# Patient Record
Sex: FEMALE | Race: WHITE | NOT HISPANIC OR LATINO | URBAN - METROPOLITAN AREA
[De-identification: names, ages, dates, MRNs, and addresses within clinical notes are randomized per-mention and may not be internally consistent; named-entity substitution may affect disease eponyms.]

---

## 2023-08-31 ENCOUNTER — HOSPITAL ENCOUNTER (OUTPATIENT)
Dept: DATA CONVERSION | Facility: HOSPITAL | Age: 53
Discharge: HOME | End: 2023-08-31
Payer: COMMERCIAL

## 2023-08-31 DIAGNOSIS — M54.13 RADICULOPATHY, CERVICOTHORACIC REGION: ICD-10-CM

## 2023-09-01 PROBLEM — R42 LIGHTHEADEDNESS: Status: ACTIVE | Noted: 2023-09-01

## 2023-09-01 PROBLEM — K21.9 GERD (GASTROESOPHAGEAL REFLUX DISEASE): Status: ACTIVE | Noted: 2023-09-01

## 2023-09-01 PROBLEM — G47.30 SLEEP APNEA: Status: ACTIVE | Noted: 2023-09-01

## 2023-09-01 PROBLEM — G62.9 NEUROPATHY: Status: ACTIVE | Noted: 2023-09-01

## 2023-09-01 PROBLEM — M51.369 DEGENERATION OF LUMBAR INTERVERTEBRAL DISC: Status: ACTIVE | Noted: 2023-09-01

## 2023-09-01 PROBLEM — F32.A DEPRESSION: Status: ACTIVE | Noted: 2023-09-01

## 2023-09-01 PROBLEM — R07.89 NON-CARDIAC CHEST PAIN: Status: ACTIVE | Noted: 2023-09-01

## 2023-09-01 PROBLEM — E78.5 DYSLIPIDEMIA: Status: ACTIVE | Noted: 2023-09-01

## 2023-09-01 PROBLEM — D72.829 LEUKOCYTOSIS: Status: ACTIVE | Noted: 2023-09-01

## 2023-09-01 PROBLEM — I10 HYPERTENSION: Status: ACTIVE | Noted: 2023-09-01

## 2023-09-01 PROBLEM — E78.5 HYPERLIPIDEMIA: Status: ACTIVE | Noted: 2023-09-01

## 2023-09-01 PROBLEM — I25.10 CORONARY ARTERY DISEASE: Status: ACTIVE | Noted: 2023-09-01

## 2023-09-01 PROBLEM — L03.90 CELLULITIS: Status: ACTIVE | Noted: 2023-09-01

## 2023-09-01 PROBLEM — N95.1 VAGINAL DRYNESS, MENOPAUSAL: Status: ACTIVE | Noted: 2023-09-01

## 2023-09-01 PROBLEM — I10 BENIGN ESSENTIAL HYPERTENSION: Status: ACTIVE | Noted: 2023-09-01

## 2023-09-01 PROBLEM — E11.9 TYPE 2 DIABETES MELLITUS (MULTI): Status: ACTIVE | Noted: 2023-09-01

## 2023-09-01 PROBLEM — N92.1 BREAKTHROUGH BLEEDING: Status: ACTIVE | Noted: 2023-09-01

## 2023-09-01 PROBLEM — E66.01 SEVERE OBESITY (BMI >= 40) (MULTI): Status: ACTIVE | Noted: 2023-09-01

## 2023-09-01 PROBLEM — M51.36 DEGENERATION OF LUMBAR INTERVERTEBRAL DISC: Status: ACTIVE | Noted: 2023-09-01

## 2023-09-01 PROBLEM — F41.9 ANXIETY: Status: ACTIVE | Noted: 2023-09-01

## 2023-09-01 PROBLEM — N92.6 IRREGULAR MENSES: Status: ACTIVE | Noted: 2023-09-01

## 2023-09-01 PROBLEM — E55.9 VITAMIN D DEFICIENCY: Status: ACTIVE | Noted: 2023-09-01

## 2023-09-01 PROBLEM — R73.09 BLOOD GLUCOSE ABNORMAL: Status: ACTIVE | Noted: 2023-09-01

## 2023-09-01 PROBLEM — E66.9 OBESITY WITH BODY MASS INDEX 30 OR GREATER: Status: ACTIVE | Noted: 2023-09-01

## 2023-09-01 PROBLEM — R10.13 EPIGASTRIC PAIN: Status: ACTIVE | Noted: 2023-09-01

## 2023-09-01 PROBLEM — Z91.148 NONCOMPLIANCE WITH MEDICATION REGIMEN: Status: ACTIVE | Noted: 2023-09-01

## 2023-09-01 PROBLEM — G43.909 MIGRAINE: Status: ACTIVE | Noted: 2023-09-01

## 2023-09-01 PROBLEM — Z91.89 AT RISK FOR BLEEDING: Status: ACTIVE | Noted: 2023-09-01

## 2023-09-01 PROBLEM — F17.200 TOBACCO DEPENDENCE: Status: ACTIVE | Noted: 2023-09-01

## 2023-09-01 RX ORDER — VALSARTAN 40 MG/1
40 TABLET ORAL DAILY
COMMUNITY
End: 2023-10-24 | Stop reason: SDUPTHER

## 2023-09-01 RX ORDER — EZETIMIBE 10 MG/1
10 TABLET ORAL DAILY
COMMUNITY
End: 2023-12-07 | Stop reason: SDUPTHER

## 2023-09-01 RX ORDER — GABAPENTIN 300 MG/1
600 CAPSULE ORAL DAILY
COMMUNITY
Start: 2023-08-11 | End: 2023-11-06 | Stop reason: ALTCHOICE

## 2023-09-01 RX ORDER — ERGOCALCIFEROL 1.25 MG/1
1 CAPSULE ORAL
COMMUNITY

## 2023-09-01 RX ORDER — TOPIRAMATE 100 MG/1
200 TABLET, FILM COATED ORAL NIGHTLY
COMMUNITY

## 2023-09-01 RX ORDER — GABAPENTIN 600 MG/1
2 TABLET, FILM COATED ORAL NIGHTLY
COMMUNITY

## 2023-09-01 RX ORDER — AMITRIPTYLINE HYDROCHLORIDE 25 MG/1
25 TABLET, FILM COATED ORAL NIGHTLY
COMMUNITY
End: 2024-01-24 | Stop reason: SDUPTHER

## 2023-09-01 RX ORDER — CARVEDILOL 12.5 MG/1
12.5 TABLET ORAL 2 TIMES DAILY
COMMUNITY
End: 2024-01-24 | Stop reason: SDUPTHER

## 2023-09-01 RX ORDER — NORETHINDRONE 5 MG/1
5 TABLET ORAL DAILY
COMMUNITY
Start: 2020-10-26 | End: 2024-01-25 | Stop reason: SDUPTHER

## 2023-09-01 RX ORDER — BLOOD SUGAR DIAGNOSTIC
STRIP MISCELLANEOUS
COMMUNITY
Start: 2023-08-03 | End: 2024-01-24 | Stop reason: SDUPTHER

## 2023-09-01 RX ORDER — ESTRADIOL 0.1 MG/G
CREAM VAGINAL
COMMUNITY
Start: 2022-11-17 | End: 2023-11-06 | Stop reason: ALTCHOICE

## 2023-09-01 RX ORDER — DULAGLUTIDE 0.75 MG/.5ML
INJECTION, SOLUTION SUBCUTANEOUS
COMMUNITY
Start: 2022-09-21 | End: 2023-11-06 | Stop reason: ALTCHOICE

## 2023-09-01 RX ORDER — VERAPAMIL HYDROCHLORIDE 120 MG/1
120 TABLET, FILM COATED, EXTENDED RELEASE ORAL NIGHTLY
COMMUNITY
Start: 2023-06-28

## 2023-09-01 RX ORDER — PRAVASTATIN SODIUM 80 MG/1
80 TABLET ORAL DAILY
COMMUNITY
End: 2024-01-24 | Stop reason: SDUPTHER

## 2023-09-01 RX ORDER — LANCETS 28 GAUGE
EACH MISCELLANEOUS
COMMUNITY
Start: 2023-07-10

## 2023-09-01 RX ORDER — LANCETS 33 GAUGE
EACH MISCELLANEOUS
COMMUNITY
Start: 2023-07-14 | End: 2024-01-24 | Stop reason: SDUPTHER

## 2023-09-01 RX ORDER — DULAGLUTIDE 1.5 MG/.5ML
INJECTION, SOLUTION SUBCUTANEOUS
COMMUNITY
End: 2023-12-04 | Stop reason: SDUPTHER

## 2023-09-01 RX ORDER — POTASSIUM CHLORIDE 750 MG/1
10 TABLET, EXTENDED RELEASE ORAL 2 TIMES DAILY
COMMUNITY
End: 2024-01-22

## 2023-09-01 RX ORDER — HYDROCHLOROTHIAZIDE 12.5 MG/1
12.5 CAPSULE ORAL EVERY MORNING
COMMUNITY
End: 2024-01-24 | Stop reason: SDUPTHER

## 2023-09-01 RX ORDER — METFORMIN HYDROCHLORIDE 500 MG/1
500 TABLET ORAL
COMMUNITY
Start: 2022-10-08 | End: 2023-11-06 | Stop reason: ALTCHOICE

## 2023-09-01 RX ORDER — ALBUTEROL SULFATE 90 UG/1
2 AEROSOL, METERED RESPIRATORY (INHALATION) EVERY 4 HOURS PRN
COMMUNITY
Start: 2017-01-03

## 2023-09-01 RX ORDER — BUTALBITAL, ACETAMINOPHEN AND CAFFEINE 50; 325; 40 MG/1; MG/1; MG/1
TABLET ORAL
COMMUNITY

## 2023-09-01 RX ORDER — ESCITALOPRAM OXALATE 20 MG/1
20 TABLET ORAL NIGHTLY
COMMUNITY
Start: 2023-08-01

## 2023-10-24 DIAGNOSIS — E11.9 TYPE 2 DIABETES MELLITUS WITHOUT COMPLICATION, WITHOUT LONG-TERM CURRENT USE OF INSULIN (MULTI): Primary | ICD-10-CM

## 2023-10-24 RX ORDER — VALSARTAN 40 MG/1
40 TABLET ORAL DAILY
Qty: 90 TABLET | Refills: 0 | Status: SHIPPED | OUTPATIENT
Start: 2023-10-24 | End: 2024-01-24 | Stop reason: SDUPTHER

## 2023-10-26 ENCOUNTER — TELEPHONE (OUTPATIENT)
Dept: PRIMARY CARE | Facility: CLINIC | Age: 53
End: 2023-10-26
Payer: COMMERCIAL

## 2023-10-26 DIAGNOSIS — E11.9 TYPE 2 DIABETES MELLITUS WITHOUT COMPLICATION, WITHOUT LONG-TERM CURRENT USE OF INSULIN (MULTI): ICD-10-CM

## 2023-10-26 RX ORDER — FLASH GLUCOSE SENSOR
KIT MISCELLANEOUS
Qty: 2 EACH | Refills: 2 | Status: SHIPPED | OUTPATIENT
Start: 2023-10-26 | End: 2023-10-27

## 2023-11-02 ENCOUNTER — LAB (OUTPATIENT)
Dept: LAB | Facility: LAB | Age: 53
End: 2023-11-02
Payer: COMMERCIAL

## 2023-11-02 DIAGNOSIS — I10 ESSENTIAL (PRIMARY) HYPERTENSION: Primary | ICD-10-CM

## 2023-11-02 DIAGNOSIS — E11.9 TYPE 2 DIABETES MELLITUS WITHOUT COMPLICATIONS (MULTI): ICD-10-CM

## 2023-11-02 LAB
ANION GAP SERPL CALC-SCNC: 14 MMOL/L
BUN SERPL-MCNC: 6 MG/DL (ref 8–25)
CALCIUM SERPL-MCNC: 9.1 MG/DL (ref 8.5–10.4)
CHLORIDE SERPL-SCNC: 102 MMOL/L (ref 97–107)
CO2 SERPL-SCNC: 23 MMOL/L (ref 24–31)
CREAT SERPL-MCNC: 0.8 MG/DL (ref 0.4–1.6)
ERYTHROCYTE [DISTWIDTH] IN BLOOD BY AUTOMATED COUNT: 13.5 % (ref 11.5–14.5)
EST. AVERAGE GLUCOSE BLD GHB EST-MCNC: 171 MG/DL
GFR SERPL CREATININE-BSD FRML MDRD: 88 ML/MIN/1.73M*2
GLUCOSE SERPL-MCNC: 202 MG/DL (ref 65–99)
HBA1C MFR BLD: 7.6 %
HCT VFR BLD AUTO: 50.1 % (ref 36–46)
HGB BLD-MCNC: 16.4 G/DL (ref 12–16)
MCH RBC QN AUTO: 31.1 PG (ref 26–34)
MCHC RBC AUTO-ENTMCNC: 32.7 G/DL (ref 32–36)
MCV RBC AUTO: 95 FL (ref 80–100)
NRBC BLD-RTO: 0 /100 WBCS (ref 0–0)
PLATELET # BLD AUTO: 237 X10*3/UL (ref 150–450)
POTASSIUM SERPL-SCNC: 4.1 MMOL/L (ref 3.4–5.1)
RBC # BLD AUTO: 5.27 X10*6/UL (ref 4–5.2)
SODIUM SERPL-SCNC: 139 MMOL/L (ref 133–145)
WBC # BLD AUTO: 9.6 X10*3/UL (ref 4.4–11.3)

## 2023-11-02 PROCEDURE — 80048 BASIC METABOLIC PNL TOTAL CA: CPT

## 2023-11-02 PROCEDURE — 36415 COLL VENOUS BLD VENIPUNCTURE: CPT

## 2023-11-02 PROCEDURE — 85027 COMPLETE CBC AUTOMATED: CPT

## 2023-11-02 PROCEDURE — 83036 HEMOGLOBIN GLYCOSYLATED A1C: CPT

## 2023-11-06 ENCOUNTER — OFFICE VISIT (OUTPATIENT)
Dept: PRIMARY CARE | Facility: CLINIC | Age: 53
End: 2023-11-06
Payer: COMMERCIAL

## 2023-11-06 VITALS
SYSTOLIC BLOOD PRESSURE: 116 MMHG | OXYGEN SATURATION: 98 % | DIASTOLIC BLOOD PRESSURE: 74 MMHG | TEMPERATURE: 97 F | WEIGHT: 278 LBS | BODY MASS INDEX: 42.27 KG/M2 | HEART RATE: 80 BPM

## 2023-11-06 DIAGNOSIS — I10 BENIGN ESSENTIAL HYPERTENSION: Primary | ICD-10-CM

## 2023-11-06 DIAGNOSIS — E11.9 TYPE 2 DIABETES MELLITUS WITHOUT COMPLICATION, WITHOUT LONG-TERM CURRENT USE OF INSULIN (MULTI): ICD-10-CM

## 2023-11-06 PROCEDURE — 90686 IIV4 VACC NO PRSV 0.5 ML IM: CPT | Performed by: INTERNAL MEDICINE

## 2023-11-06 PROCEDURE — 3074F SYST BP LT 130 MM HG: CPT | Performed by: INTERNAL MEDICINE

## 2023-11-06 PROCEDURE — 3078F DIAST BP <80 MM HG: CPT | Performed by: INTERNAL MEDICINE

## 2023-11-06 PROCEDURE — 3051F HG A1C>EQUAL 7.0%<8.0%: CPT | Performed by: INTERNAL MEDICINE

## 2023-11-06 PROCEDURE — 4010F ACE/ARB THERAPY RXD/TAKEN: CPT | Performed by: INTERNAL MEDICINE

## 2023-11-06 PROCEDURE — 99213 OFFICE O/P EST LOW 20 MIN: CPT | Performed by: INTERNAL MEDICINE

## 2023-11-06 PROCEDURE — 4004F PT TOBACCO SCREEN RCVD TLK: CPT | Performed by: INTERNAL MEDICINE

## 2023-11-06 PROCEDURE — 99213 OFFICE O/P EST LOW 20 MIN: CPT | Mod: 25 | Performed by: INTERNAL MEDICINE

## 2023-11-06 ASSESSMENT — ENCOUNTER SYMPTOMS
MYALGIAS: 0
DYSURIA: 0
TREMORS: 0
DEPRESSION: 0
SEIZURES: 0
TROUBLE SWALLOWING: 0
CHILLS: 0
SHORTNESS OF BREATH: 0
FREQUENCY: 0
VOMITING: 0
COUGH: 0
BACK PAIN: 1
OCCASIONAL FEELINGS OF UNSTEADINESS: 0
FATIGUE: 0
POLYDIPSIA: 0
NUMBNESS: 0
LOSS OF SENSATION IN FEET: 0
SINUS PRESSURE: 0
ARTHRALGIAS: 1
BLOOD IN STOOL: 0
WHEEZING: 0
POLYPHAGIA: 0
ABDOMINAL PAIN: 0
PALPITATIONS: 0
NAUSEA: 0

## 2023-11-06 ASSESSMENT — PATIENT HEALTH QUESTIONNAIRE - PHQ9
SUM OF ALL RESPONSES TO PHQ9 QUESTIONS 1 AND 2: 0
1. LITTLE INTEREST OR PLEASURE IN DOING THINGS: NOT AT ALL
2. FEELING DOWN, DEPRESSED OR HOPELESS: NOT AT ALL

## 2023-11-06 ASSESSMENT — PAIN SCALES - GENERAL: PAINLEVEL: 6

## 2023-11-06 NOTE — PROGRESS NOTES
Subjective   Patient ID: Camille Philip is a 53 y.o. female who presents for Follow-up.    HPI     Has history of hypertension, hyperlipidemia, type 2 diabetes mellitus, depression, coronary artery disease, migraine headaches.         History of coronary artery disease status post CABG in 2013-follows with cardiologist Dr Bejarano. Was advised to follow up as needed.         History of diabetes mellitus-compliant with medications. Denied any hypoglycemic episodes. Has free style Melissa.         History of hyperlipidemia-compliant with medications.         H/O Migraine headaches- Taking Amitriptyline and migraine headaches.         Takes Gabapentin for neuropathy and migraine headaches.         H/O Tobacco dependence- smokes pack every 2-3 days. Started smoking when she was 15 years old.         Seeing Dr Has for left knee pain- taking Medrol dose sherly. She was told that-she has mild meniscal tear, she will go for repair if no improvement in next few months.   Seeing Dr Mckay for low back pain, had spinal block 2 weeks ago   Complaining of nasal conegstion, with post nasal drainage- advised to try flonase OTC    Review of Systems   Constitutional:  Negative for chills and fatigue.   HENT:  Negative for postnasal drip, sinus pressure and trouble swallowing.    Respiratory:  Negative for cough, shortness of breath and wheezing.    Cardiovascular:  Negative for chest pain, palpitations and leg swelling.   Gastrointestinal:  Negative for abdominal pain, blood in stool, nausea and vomiting.   Endocrine: Negative for polydipsia, polyphagia and polyuria.   Genitourinary:  Negative for dysuria and frequency.   Musculoskeletal:  Positive for arthralgias and back pain. Negative for myalgias.   Skin:  Negative for rash.   Neurological:  Negative for tremors, seizures and numbness.   Psychiatric/Behavioral:  Negative for behavioral problems.        Objective   /74 (BP Location: Left arm, Patient Position: Sitting, BP  Cuff Size: Adult)   Pulse 80   Temp 36.1 °C (97 °F) (Temporal)   Wt 126 kg (278 lb)   SpO2 98%   BMI 42.27 kg/m²     Physical Exam  Constitutional:       General: She is not in acute distress.     Appearance: Normal appearance.   HENT:      Head: Normocephalic and atraumatic.      Right Ear: Tympanic membrane normal.      Left Ear: Tympanic membrane normal.   Eyes:      Extraocular Movements: Extraocular movements intact.      Pupils: Pupils are equal, round, and reactive to light.   Cardiovascular:      Rate and Rhythm: Normal rate and regular rhythm.      Heart sounds: Normal heart sounds. No murmur heard.     No friction rub.   Pulmonary:      Effort: Pulmonary effort is normal.      Breath sounds: Normal breath sounds. No wheezing or rales.   Abdominal:      General: Bowel sounds are normal.      Palpations: Abdomen is soft.      Tenderness: There is no abdominal tenderness. There is no guarding.   Musculoskeletal:         General: Normal range of motion.      Cervical back: Normal range of motion and neck supple.      Right lower leg: No edema.      Left lower leg: No edema.   Skin:     Findings: No rash.   Neurological:      Mental Status: She is alert.      Cranial Nerves: No cranial nerve deficit.   Psychiatric:         Mood and Affect: Mood normal.         Assessment/Plan       Camille was seen today for follow-up.  Diagnoses and all orders for this visit:  Benign essential hypertension (Primary)  Type 2 diabetes mellitus without complication, without long-term current use of insulin (CMS/[facility])  Other orders  -     Flu vaccine (IIV4) age 6 months and greater, preservative free     Reviewed recent labs with patient.  Last hemoglobin A1c 7.6, advised compliance with low-carb diet.  Stated due to back pain she has not been compliant with the diet.  She has also not been very active.   Planing of nasal congestion, denied any sinus pressure, fever, chills.  Advised to try Flonase over-the-counter    Current  Outpatient Medications   Medication Instructions    albuterol (Proventil HFA) 90 mcg/actuation inhaler 2 puffs, inhalation, Every 4 hours PRN    amitriptyline (ELAVIL) 25 mg, oral, Nightly    butalbital-acetaminophen-caff -40 mg tablet  TAKE 1 TO 2 TABLET BY MOUTH EVERY 4 HOURS AS NEEDED F    carvedilol (COREG) 12.5 mg, oral, 2 times daily, With food    cyanocobalamin, vitamin B-12, (VITAMIN B-12 ORAL) 1000 MCG  as directed Orally    ergocalciferol (Vitamin D-2) 1.25 MG (93062 UT) capsule 1 capsule, oral, Weekly    escitalopram (LEXAPRO) 20 mg, oral, Daily    ezetimibe (ZETIA) 10 mg, oral, Daily    FreeStyle Lancets 28 gauge USE AS DIRECTED EVERY 12 HOURS    Gralise 600 mg tablet extended release 24 hr 2 tablets, oral, Daily    hydroCHLOROthiazide (MICROZIDE) 12.5 mg, oral, Every morning    norethindrone (AYGESTIN) 5 mg, oral, Daily    OneTouch Delica Plus Lancet 33 gauge misc USE AS DIRECTED DAILY    OneTouch Ultra Test strip USE ONCE DAILY AS DIRECTED    potassium chloride CR 10 mEq ER tablet 10 mEq, oral, 2 times daily, WITH FOOD    pravastatin (PRAVACHOL) 80 mg, oral, Daily    topiramate (TOPAMAX) 200 mg, oral, Nightly    Trulicity 1.5 mg/0.5 mL pen injector injection Inject entire syringe subcutaneously weekly    valsartan (DIOVAN) 40 mg, oral, Daily    verapamil SR (CALAN-SR) 120 mg, oral, Daily

## 2023-11-16 ENCOUNTER — EVALUATION (OUTPATIENT)
Dept: PHYSICAL THERAPY | Facility: CLINIC | Age: 53
End: 2023-11-16
Payer: COMMERCIAL

## 2023-11-16 DIAGNOSIS — M47.26 OTHER SPONDYLOSIS WITH RADICULOPATHY, LUMBAR REGION: ICD-10-CM

## 2023-11-16 DIAGNOSIS — M54.16 LUMBAR RADICULOPATHY: Primary | ICD-10-CM

## 2023-11-16 PROCEDURE — 97162 PT EVAL MOD COMPLEX 30 MIN: CPT | Mod: GP | Performed by: PHYSICAL THERAPIST

## 2023-11-16 ASSESSMENT — ENCOUNTER SYMPTOMS
LOSS OF SENSATION IN FEET: 1
DEPRESSION: 0
OCCASIONAL FEELINGS OF UNSTEADINESS: 1

## 2023-11-16 NOTE — PROGRESS NOTES
Physical Therapy Evaluation and Treatment      Patient Name: Camille Philip  MRN: [id number]  Today's Date: 11/16/2023  Time Calculation  Start Time: 0945  Stop Time: 1022  Time Calculation (min): 37 min    Current Problem:   1. Lumbar radiculopathy  Follow Up In Physical Therapy      2. Other spondylosis with radiculopathy, lumbar region  PT eval and treat            Subjective    General:  Pt reports she has been having low back pain, originally feeling pain in her right hip. Pain starting in low back for months and radiating down her right leg and past her knee. Now getting pain in low back and extending down BOTH legs. Walking and standing for any length of time is very difficult. Originally used 2 crutches and wheelchair to get around. Now she is using just one crutch. No falls but feels unsteady. One nerve block, no relief. MD would like her to try traction. Light housework (sweeping, mopping, etc.) is painful. Pain when sleeping at night, cannot get comfortable, unable to lay on back.     Works in [employer] which has been very difficult and she currently is not working since August 25th.       Precautions: None  Pain: 5/10 and increases throughout the day    Objective   ROM   Lumbar: Flexion 25%, Extension neutral, Right rotation 25%, Left rotation 25%, Right side bending 50%, Left side bending 50%    *Pain with all motions     MMT   Bilat LE strength:  Hip flex 4/5  Knee flex 4+/5  Knee ext  4+/5    Dermatomes/Myotomes/Reflexes  Numbness/tingling in bilat LE     Palpation  TTP lumbar vertebrae and paraspinals.  TTP bilat PSIS and QL.      Flexibility   Moderate bilat hip flexor and hamstring tightness    Special Tests   SLUMP: negative, bilaterally - exhibits tightness however  SLR: positive, bilaterally    Transfers   Bilat UE support for sit to stand.     Gait   Ambulates with decreased phoenix and antaglic gait using one crutch. Step-to-pattern. Decreased stance time and step length on right. Mild  unsteadiness. Bilat LE in ER    Stairs   Ascends and descends stairs with step-to-pattern with 1 rail    Outcome Measures:  Low Back Disability / Oswestry: 29/50    Treatments:  Therapeutic Exercise for HEP:  SKTC  PPT  LTR  SLR    Assessment   Pt is a 53 y.o. female with lumbar radiculopathy. Pt with decreased ROM, reduced strength, gait deficits, numbness/tingling, abnormal posture, and flexibility limitations. Pt will benefit from skilled PT to address the above deficits for improvement in functional activities.       Moderate complexity due to patient's clinical presentation being evolving with changing characteristics, with comorbidities to include arthritis, DM, HTN, all of which may negatively impact rehab tolerance and progression.     Plan:   Treatment/Interventions: Education/ Instruction, Dry needling, Gait training, Mechanical traction, Manual therapy, Neuromuscular re-education, Self care/ home management, Therapeutic activities, Therapeutic exercises  PT Plan: Skilled PT  PT Frequency: 1 time per week  Duration: 5 more visits  Rehab Potential: Good  Plan of Care Agreement: Patient        Goals:   Active       Mobility       Goal 1       Start:  11/16/23    Expected End:  02/14/24       Pt will improve lumbar spine AROM to WNL to improve I/ADLs.          Goal 2       Start:  11/16/23    Expected End:  02/14/24       Pt will improve LE strength to 5/5 to improve I/ADLs.             Pain       Goal 1       Start:  11/16/23    Expected End:  02/14/24       Pt will perform all household tasks with 0/10 pain         Goal 2       Start:  11/16/23    Expected End:  02/14/24       Pt will return and perform all work activities with 0/10 pain.

## 2023-11-28 ENCOUNTER — TREATMENT (OUTPATIENT)
Dept: PHYSICAL THERAPY | Facility: CLINIC | Age: 53
End: 2023-11-28
Payer: COMMERCIAL

## 2023-11-28 DIAGNOSIS — M54.16 LUMBAR RADICULOPATHY: ICD-10-CM

## 2023-11-28 PROCEDURE — 97110 THERAPEUTIC EXERCISES: CPT | Mod: GP | Performed by: PHYSICAL THERAPIST

## 2023-11-28 NOTE — PROGRESS NOTES
Physical Therapy Treatment    Patient Name: Camille Romero  MRN: [id number]  Today's Date: 11/28/2023  Time Calculation  Start Time: 0900  Stop Time: 0940  Time Calculation (min): 40 min  PT Therapeutic Procedures Time Entry  Therapeutic Exercise Time Entry: 40  Visit # 2/6    Current Problem   1. Lumbar radiculopathy  Follow Up In Physical Therapy          Subjective   General   Pt reports her  bought her sciatica bands for underneath her knees and feels like they may help. Pain continues to get worse as day goes on - pain is 12/10 at end of day.       Precautions: None  Pain 3/10  Post Treatment Pain Level 3/10    Objective   Reciprocal pattern with ascend/descend stairs with 1 rail and crutch    Treatments:  NuStep L4 x6 minutes  Gastroc stretch at wedge, 30 seconds x 3  Stand hamstring stretch, 30 seconds x 3 ea R/L   Stand hip ABD, 2x10 ea R/L   Mini squat, 2x10   SKTC, 30 seconds x 3 ea R/L   LTR, 5 sec hold x10 ea R/L        Assessment   Pt tolerated there ex progressions without changes in pain/symptoms. Increased time to complete all exercises, especially as session continued in length.     Plan: Cont strength, gait     OP EDUCATION: Progression of gait     Goals:   Active       Mobility       Goal 1       Start:  11/16/23    Expected End:  02/14/24       Pt will improve lumbar spine AROM to WNL to improve I/ADLs.          Goal 2       Start:  11/16/23    Expected End:  02/14/24       Pt will improve LE strength to 5/5 to improve I/ADLs.             Pain       Goal 1       Start:  11/16/23    Expected End:  02/14/24       Pt will perform all household tasks with 0/10 pain         Goal 2       Start:  11/16/23    Expected End:  02/14/24       Pt will return and perform all work activities with 0/10 pain.

## 2023-11-30 ENCOUNTER — APPOINTMENT (OUTPATIENT)
Dept: PRIMARY CARE | Facility: CLINIC | Age: 53
End: 2023-11-30
Payer: COMMERCIAL

## 2023-12-04 DIAGNOSIS — E11.9 TYPE 2 DIABETES MELLITUS WITHOUT COMPLICATION, WITHOUT LONG-TERM CURRENT USE OF INSULIN (MULTI): Primary | ICD-10-CM

## 2023-12-04 RX ORDER — DULAGLUTIDE 1.5 MG/.5ML
INJECTION, SOLUTION SUBCUTANEOUS
Qty: 2 ML | Refills: 2 | Status: SHIPPED | OUTPATIENT
Start: 2023-12-04 | End: 2024-03-04 | Stop reason: ALTCHOICE

## 2023-12-05 ENCOUNTER — TREATMENT (OUTPATIENT)
Dept: PHYSICAL THERAPY | Facility: CLINIC | Age: 53
End: 2023-12-05
Payer: COMMERCIAL

## 2023-12-05 DIAGNOSIS — M54.16 LUMBAR RADICULOPATHY: ICD-10-CM

## 2023-12-05 PROCEDURE — 97012 MECHANICAL TRACTION THERAPY: CPT | Mod: GP,CQ

## 2023-12-05 PROCEDURE — 97010 HOT OR COLD PACKS THERAPY: CPT | Mod: GP,CQ

## 2023-12-05 PROCEDURE — 97140 MANUAL THERAPY 1/> REGIONS: CPT | Mod: GP,CQ

## 2023-12-05 PROCEDURE — 97110 THERAPEUTIC EXERCISES: CPT | Mod: GP,CQ

## 2023-12-05 NOTE — PROGRESS NOTES
Physical Therapy Treatment    Patient Name: Camille Romero  MRN: [id number]  Today's Date: 12/5/2023  Time Calculation  Start Time: 0930  Stop Time: 1020  Time Calculation (min): 50 min  PT Modalities Time Entry  Hot/Cold Pack Time Entry: 15  Mechanical Traction Time Entry: 15  PT Therapeutic Procedures Time Entry  Manual Therapy Time Entry: 10  Therapeutic Exercise Time Entry: 20  Visit # 3/6    Current Problem   1. Lumbar radiculopathy  Follow Up In Physical Therapy          Subjective   General    Pt reports pain in low back which extends down both posteriolateral legs past the knees  Pain   6/10   Post Treatment Pain Level 6/10    Objective   + slump test on R  Equal pelvis alignment  Tight B psoas    Treatments:  PPT x 20   ABD bracing with Hip ABD with GTB 2 x 15  Bridges with Hip ABD with GTB  Standing hip flexor stretch       Mechanical traction: Intermittent: 75 lbs and 45 seconds on, 45 lbs and 20 seconds off, for 15 minutes in Supine, with legs 90/90     Psoas releasing B    Assessment   Assessment:    Focusing on core and hip strengthening to support the back during ADL's    Plan:    Increase pelvic traction pull next session. Progress hip strengthening    Goals:

## 2023-12-07 DIAGNOSIS — E78.5 DYSLIPIDEMIA: Primary | ICD-10-CM

## 2023-12-07 RX ORDER — EZETIMIBE 10 MG/1
10 TABLET ORAL DAILY
Qty: 90 TABLET | Refills: 2 | Status: SHIPPED | OUTPATIENT
Start: 2023-12-07

## 2023-12-12 ENCOUNTER — TREATMENT (OUTPATIENT)
Dept: PHYSICAL THERAPY | Facility: CLINIC | Age: 53
End: 2023-12-12
Payer: COMMERCIAL

## 2023-12-12 DIAGNOSIS — M54.16 LUMBAR RADICULOPATHY: ICD-10-CM

## 2023-12-12 PROCEDURE — 97012 MECHANICAL TRACTION THERAPY: CPT | Mod: GP | Performed by: PHYSICAL THERAPIST

## 2023-12-12 PROCEDURE — 97110 THERAPEUTIC EXERCISES: CPT | Mod: GP | Performed by: PHYSICAL THERAPIST

## 2023-12-12 NOTE — PROGRESS NOTES
"Physical Therapy Treatment    Patient Name: Camille Romero  MRN: [id number]  Today's Date: 12/12/2023  Time Calculation  Start Time: 1035  Stop Time: 1114  Time Calculation (min): 39 min  PT Modalities Time Entry  Mechanical Traction Time Entry: 15  PT Therapeutic Procedures Time Entry  Therapeutic Exercise Time Entry: 24  Visit # 4/6    Current Problem   1. Lumbar radiculopathy  Follow Up In Physical Therapy          Subjective   General   Pt reports a lot of soreness for 2 days after last session. Started in front of hips and went around to her low back.       Precautions: None   Pain 4.5/10  Post Treatment Pain Level \"better\"     Objective   Bilateral anterior pelvic tilt    Treatments:  Therapeutic Exercise:  NuStep L5 x6 minutes  Stand hamstring stretch, 30 seconds x 3 ea R/L   ABDOM brace, 2x10  ABDOM brace with ABD GTB, 2x20  Bridges with ABD GTB, 2x20   LTR, 5 sec hold x10 ea R/L    Mechanical traction: Intermittent: 85 lbs and 45 seconds on, 45 lbs and 20 seconds off, for 15 minutes in Supine, with legs bent       Assessment   Decreased pain in hips and LBP with traction, will continue as able. Able to increase reps of exercises this date.     Plan: Continue with traction      Goals:   Active       Mobility       Goal 1       Start:  11/16/23    Expected End:  02/14/24       Pt will improve lumbar spine AROM to WNL to improve I/ADLs.          Goal 2       Start:  11/16/23    Expected End:  02/14/24       Pt will improve LE strength to 5/5 to improve I/ADLs.             Pain       Goal 1       Start:  11/16/23    Expected End:  02/14/24       Pt will perform all household tasks with 0/10 pain         Goal 2       Start:  11/16/23    Expected End:  02/14/24       Pt will return and perform all work activities with 0/10 pain.               "

## 2023-12-19 ENCOUNTER — HOSPITAL ENCOUNTER (OUTPATIENT)
Dept: RADIOLOGY | Facility: HOSPITAL | Age: 53
Discharge: HOME | End: 2023-12-19
Payer: COMMERCIAL

## 2023-12-19 ENCOUNTER — TREATMENT (OUTPATIENT)
Dept: PHYSICAL THERAPY | Facility: CLINIC | Age: 53
End: 2023-12-19
Payer: COMMERCIAL

## 2023-12-19 ENCOUNTER — APPOINTMENT (OUTPATIENT)
Dept: PHYSICAL THERAPY | Facility: CLINIC | Age: 53
End: 2023-12-19
Payer: COMMERCIAL

## 2023-12-19 DIAGNOSIS — M54.16 LUMBAR RADICULOPATHY: ICD-10-CM

## 2023-12-19 DIAGNOSIS — M16.0 BILATERAL PRIMARY OSTEOARTHRITIS OF HIP: ICD-10-CM

## 2023-12-19 PROCEDURE — 73521 X-RAY EXAM HIPS BI 2 VIEWS: CPT

## 2023-12-19 PROCEDURE — 97012 MECHANICAL TRACTION THERAPY: CPT | Mod: GP,CQ

## 2023-12-19 PROCEDURE — 97110 THERAPEUTIC EXERCISES: CPT | Mod: GP,CQ

## 2023-12-19 NOTE — PROGRESS NOTES
Physical Therapy Treatment    Patient Name: Camille Romero  MRN: [id number]  Today's Date: 12/19/2023  Time Calculation  Start Time: 0255  Stop Time: 0340  Time Calculation (min): 45 min  PT Modalities Time Entry  Mechanical Traction Time Entry: 20  PT Therapeutic Procedures Time Entry  Therapeutic Exercise Time Entry: 25  Visit # 5/6    Current Problem   1. Lumbar radiculopathy  Follow Up In Physical Therapy          Subjective   General    Pt reports that she just had both hips x-ray'd. Claims her pain is across her low back into both lateral hips and down th sides of her legs.  Precautions:     Pain    6/10  Post Treatment Pain Level   3/10  Objective   Antalgic gait pattern with 1 crutch use.    Treatments:   PPT  PPT with hip ADD  PPT with MIP 3 x 10  Mechanical traction: Intermittent: 90 lbs and 60 seconds on, 60 lbs and 20 seconds off, for 15 minutes in Supine, with wedge     Assessment   Assessment:    Pt had good reduction in low back and R hip pain. L anterior hip discomfort still present    Plan:    Continue with core and hip sterengthening.    OP EDUCATION:       Goals:

## 2023-12-26 ENCOUNTER — TREATMENT (OUTPATIENT)
Dept: PHYSICAL THERAPY | Facility: CLINIC | Age: 53
End: 2023-12-26
Payer: COMMERCIAL

## 2023-12-26 DIAGNOSIS — M54.16 LUMBAR RADICULOPATHY: ICD-10-CM

## 2023-12-26 PROCEDURE — 97012 MECHANICAL TRACTION THERAPY: CPT | Mod: GP | Performed by: PHYSICAL THERAPIST

## 2023-12-26 PROCEDURE — 97110 THERAPEUTIC EXERCISES: CPT | Mod: GP | Performed by: PHYSICAL THERAPIST

## 2023-12-26 NOTE — PROGRESS NOTES
Physical Therapy Treatment/Discharge    Patient Name: Camille Romero  MRN: [id number]  Today's Date: 12/26/2023  Time Calculation  Start Time: 0946  Stop Time: 1028  Time Calculation (min): 42 min  PT Modalities Time Entry  Mechanical Traction Time Entry: 15  PT Therapeutic Procedures Time Entry  Therapeutic Exercise Time Entry: 25  Visit # 6/6    Current Problem   1. Lumbar radiculopathy  Follow Up In Physical Therapy          Subjective   General   Pt reports she continues to get a lot of pain in low back that extends into both hips, left more than right. She will be getting injections in both hips, just awaiting insurance.       Precautions: None  Pain 10/10  Post Treatment Pain Level 10/10    Objective   Lumbar spine AROM:  Flex 50%  Ext 25%  Lat Flex L/R 50%  Rot L/R 90%    Bilat LE strength:  Hip flex 4+/5  Knee flex 5/5  Knee ext 5/5    Flexibility: Mild tightness in bilat hip flexors, L>R  Gait: Ambulates with decreased phoenix using 1 crutch. Decreased stance time on left, mild forward flex posture. Mild trendelenberg pattern  Denies numbness/tingling     Treatments:  Therapeutic Exercise:  NuStep L5 x6 minutes   Stand hamstring stretch, 30 seconds x 3 ea R/L  Stand hip ABD, 2x15 ea R/L  ABDOM brace, 2x10      Mechanical traction: Intermittent: 90 lbs and 60 seconds on, 60 lbs and 20 seconds off, for 15 minutes in Supine, with wedge     Assessment   Pt with slow progress during therapy and goals remain partially met. She has improved LE strength, but continues with ROM and gait deficits. At this time I recommend she follow-up with physician. She is to be discharged at this time and continue with HEP on her.     Plan: Discharge    OP EDUCATION: Cont with HEP.       Goals:   Resolved       Mobility       Goal 1 (Progressing)       Start:  11/16/23    Expected End:  02/14/24    Resolved:  12/26/23    Pt will improve lumbar spine AROM to WNL to improve I/ADLs.          Goal 2 (Progressing)       Start:   11/16/23    Expected End:  02/14/24    Resolved:  12/26/23    Pt will improve LE strength to 5/5 to improve I/ADLs.             Pain       Goal 1 (Progressing)       Start:  11/16/23    Expected End:  02/14/24    Resolved:  12/26/23    Pt will perform all household tasks with 0/10 pain         Goal 2 (Progressing)       Start:  11/16/23    Expected End:  02/14/24    Resolved:  12/26/23    Pt will return and perform all work activities with 0/10 pain.

## 2024-01-19 DIAGNOSIS — E87.6 HYPOKALEMIA: Primary | ICD-10-CM

## 2024-01-22 RX ORDER — POTASSIUM CHLORIDE 750 MG/1
10 TABLET, EXTENDED RELEASE ORAL
Qty: 180 TABLET | Refills: 0 | Status: SHIPPED | OUTPATIENT
Start: 2024-01-22 | End: 2024-01-24 | Stop reason: SDUPTHER

## 2024-01-24 ENCOUNTER — TELEPHONE (OUTPATIENT)
Dept: OBSTETRICS AND GYNECOLOGY | Facility: CLINIC | Age: 54
End: 2024-01-24
Payer: COMMERCIAL

## 2024-01-24 DIAGNOSIS — I10 PRIMARY HYPERTENSION: Primary | ICD-10-CM

## 2024-01-24 DIAGNOSIS — E78.2 MIXED HYPERLIPIDEMIA: ICD-10-CM

## 2024-01-24 DIAGNOSIS — Z30.09 ENCOUNTER FOR COUNSELING REGARDING CONTRACEPTION: Primary | ICD-10-CM

## 2024-01-24 DIAGNOSIS — E11.9 TYPE 2 DIABETES MELLITUS WITHOUT COMPLICATION, WITHOUT LONG-TERM CURRENT USE OF INSULIN (MULTI): ICD-10-CM

## 2024-01-24 DIAGNOSIS — E87.6 HYPOKALEMIA: ICD-10-CM

## 2024-01-24 RX ORDER — BLOOD SUGAR DIAGNOSTIC
1 STRIP MISCELLANEOUS 2 TIMES DAILY
Qty: 200 EACH | Refills: 1 | Status: SHIPPED | OUTPATIENT
Start: 2024-01-24 | End: 2024-03-04 | Stop reason: SDUPTHER

## 2024-01-24 RX ORDER — HYDROCHLOROTHIAZIDE 12.5 MG/1
12.5 CAPSULE ORAL EVERY MORNING
Qty: 90 CAPSULE | Refills: 0 | Status: SHIPPED | OUTPATIENT
Start: 2024-01-24 | End: 2024-04-19 | Stop reason: SDUPTHER

## 2024-01-24 RX ORDER — LANCETS 33 GAUGE
1 EACH MISCELLANEOUS 2 TIMES DAILY
Qty: 200 EACH | Refills: 1 | Status: SHIPPED | OUTPATIENT
Start: 2024-01-24 | End: 2024-07-22

## 2024-01-24 RX ORDER — CARVEDILOL 12.5 MG/1
12.5 TABLET ORAL 2 TIMES DAILY
Qty: 180 TABLET | Refills: 0 | Status: SHIPPED | OUTPATIENT
Start: 2024-01-24 | End: 2024-04-19 | Stop reason: SDUPTHER

## 2024-01-24 RX ORDER — AMITRIPTYLINE HYDROCHLORIDE 25 MG/1
50 TABLET, FILM COATED ORAL NIGHTLY
Qty: 180 TABLET | Refills: 0 | Status: SHIPPED | OUTPATIENT
Start: 2024-01-24 | End: 2024-04-19 | Stop reason: SDUPTHER

## 2024-01-24 RX ORDER — VALSARTAN 40 MG/1
40 TABLET ORAL DAILY
Qty: 90 TABLET | Refills: 0 | Status: SHIPPED | OUTPATIENT
Start: 2024-01-24 | End: 2024-04-19 | Stop reason: SDUPTHER

## 2024-01-24 RX ORDER — POTASSIUM CHLORIDE 750 MG/1
10 TABLET, FILM COATED, EXTENDED RELEASE ORAL
Qty: 180 TABLET | Refills: 0 | Status: SHIPPED | OUTPATIENT
Start: 2024-01-24 | End: 2024-04-19 | Stop reason: SDUPTHER

## 2024-01-24 RX ORDER — PRAVASTATIN SODIUM 80 MG/1
80 TABLET ORAL DAILY
Qty: 90 TABLET | Refills: 0 | Status: SHIPPED | OUTPATIENT
Start: 2024-01-24 | End: 2024-04-23

## 2024-01-24 NOTE — TELEPHONE ENCOUNTER
PT HAS YEARLY SCHEDULED FOR 01/31/2024. CAN REFILL OF NORETHINDRONE (AYGESTIN) 5MG TABLET BE SENT TO [facility]

## 2024-01-25 RX ORDER — NORETHINDRONE 5 MG/1
5 TABLET ORAL DAILY
Qty: 30 TABLET | Refills: 1 | Status: SHIPPED | OUTPATIENT
Start: 2024-01-25 | End: 2024-04-15 | Stop reason: SDUPTHER

## 2024-01-31 ENCOUNTER — TELEPHONE (OUTPATIENT)
Dept: PRIMARY CARE | Facility: CLINIC | Age: 54
End: 2024-01-31
Payer: COMMERCIAL

## 2024-01-31 ENCOUNTER — APPOINTMENT (OUTPATIENT)
Dept: OBSTETRICS AND GYNECOLOGY | Facility: CLINIC | Age: 54
End: 2024-01-31
Payer: COMMERCIAL

## 2024-01-31 NOTE — TELEPHONE ENCOUNTER
Pt called stating pharmacist told her Trulicity 1.5mg is currently out of stock nationally. They told her she needs a new prescription for the next dosage. Please advise

## 2024-02-01 DIAGNOSIS — E11.9 TYPE 2 DIABETES MELLITUS WITHOUT COMPLICATION, WITHOUT LONG-TERM CURRENT USE OF INSULIN (MULTI): ICD-10-CM

## 2024-02-06 ENCOUNTER — OFFICE VISIT (OUTPATIENT)
Dept: OBSTETRICS AND GYNECOLOGY | Facility: CLINIC | Age: 54
End: 2024-02-06
Payer: COMMERCIAL

## 2024-02-06 VITALS
WEIGHT: 278.4 LBS | BODY MASS INDEX: 43.7 KG/M2 | SYSTOLIC BLOOD PRESSURE: 140 MMHG | DIASTOLIC BLOOD PRESSURE: 83 MMHG | HEIGHT: 67 IN

## 2024-02-06 DIAGNOSIS — Z30.09 ENCOUNTER FOR COUNSELING REGARDING CONTRACEPTION: Primary | ICD-10-CM

## 2024-02-06 DIAGNOSIS — N95.0 PMB (POSTMENOPAUSAL BLEEDING): ICD-10-CM

## 2024-02-06 DIAGNOSIS — Z01.419 ENCOUNTER FOR GYNECOLOGICAL EXAMINATION (GENERAL) (ROUTINE) WITHOUT ABNORMAL FINDINGS: ICD-10-CM

## 2024-02-06 LAB
BILIRUBIN, POC: NEGATIVE
BLOOD URINE, POC: POSITIVE
CLARITY, POC: CLEAR
COLOR, POC: YELLOW
GLUCOSE URINE, POC: NEGATIVE
KETONES, POC: NEGATIVE
LEUKOCYTE EST, POC: NEGATIVE
NITRITE, POC: NEGATIVE
PH, POC: 5
POC APPEARANCE OF BODY FLUID: CLEAR
SPECIFIC GRAVITY, POC: 1.01
URINE PROTEIN, POC: NEGATIVE
UROBILINOGEN, POC: NEGATIVE

## 2024-02-06 PROCEDURE — 99396 PREV VISIT EST AGE 40-64: CPT | Performed by: OBSTETRICS & GYNECOLOGY

## 2024-02-06 PROCEDURE — 88142 CYTOPATH C/V THIN LAYER: CPT | Mod: TC,GCY,WESLAB | Performed by: OBSTETRICS & GYNECOLOGY

## 2024-02-06 PROCEDURE — 3077F SYST BP >= 140 MM HG: CPT | Performed by: OBSTETRICS & GYNECOLOGY

## 2024-02-06 PROCEDURE — 4010F ACE/ARB THERAPY RXD/TAKEN: CPT | Performed by: OBSTETRICS & GYNECOLOGY

## 2024-02-06 PROCEDURE — 81002 URINALYSIS NONAUTO W/O SCOPE: CPT | Performed by: OBSTETRICS & GYNECOLOGY

## 2024-02-06 PROCEDURE — 3079F DIAST BP 80-89 MM HG: CPT | Performed by: OBSTETRICS & GYNECOLOGY

## 2024-02-06 ASSESSMENT — PATIENT HEALTH QUESTIONNAIRE - PHQ9
1. LITTLE INTEREST OR PLEASURE IN DOING THINGS: NOT AT ALL
SUM OF ALL RESPONSES TO PHQ9 QUESTIONS 1 AND 2: 0
2. FEELING DOWN, DEPRESSED OR HOPELESS: NOT AT ALL

## 2024-02-06 ASSESSMENT — ENCOUNTER SYMPTOMS
LOSS OF SENSATION IN FEET: 0
OCCASIONAL FEELINGS OF UNSTEADINESS: 0
DEPRESSION: 0

## 2024-02-06 ASSESSMENT — PAIN SCALES - GENERAL: PAINLEVEL: 7

## 2024-02-06 NOTE — PROGRESS NOTES
"Camille Romero is a 53 y.o.  who presents for her annual gynecologic exam     Complains: She is currently on norethindrone , she is bleeding on it   She tried to reduce the dose and had cramping and bleeding       Menses:   postmenopausal         History of abnormal pap: yes        OB History          3    Para   3    Term   2       1    AB        Living   3         SAB        IAB        Ectopic        Multiple        Live Births                   History reviewed. No pertinent past medical history.  History reviewed. No pertinent surgical history.  Family History   Problem Relation Name Age of Onset    Hypertension Mother      Stroke Mother          CVA    Diabetes Father      Hypertension Father      Heart disease Father      Cancer Father      Heart disease Father's Brother      Other (stents) Father's Brother      Other (defribrillator) Father's Brother       Social History     Tobacco Use    Smoking status: Every Day     Types: Cigarettes    Smokeless tobacco: Never   Vaping Use    Vaping Use: Never used   Substance Use Topics    Alcohol use: Yes     Alcohol/week: 1.0 standard drink of alcohol     Types: 1 Glasses of wine per week    Drug use: Never           REVIEW OF SYSTEMS  Abdomen: No abdominal pain, nausea, vomiting, diarrhea, or constipation.   No bloating, early satiety, indigestion, or increased flatulence.  Bladder: No dysuria, gross hematuria, urinary frequency, urinary urgency, or incontinence.  Breast: No breast lumps, nipple d/c, overlying skin changes, redness or skin retraction.  Allergies and current medication updated:Yes        EXAM:  /83   Ht 1.704 m (5' 7.09\")   Wt 126 kg (278 lb 6.4 oz)   BMI 43.49 kg/m²   GENERAL: pleasant, female in no apparent distress  HEENT: Normocephalic, atraumatic, mucus membranes moist and no lesions  NECK: Supple, full range of motion, no adenopathy and thyroid normal  DERMATOLOGY: Normal, without lesions, non-icteric and " non-hirsute  BREAST: soft, non-tender, symmetric, no dominant mass, normal nipple-areolar complex, no lymphadenopathy and no nipple discharge  CHEST: Normal inspiratory effort  ABDOMEN: soft, non-tender and no masses  PELVIC: external genitalia normal, normal Bartholin's glands, urethra, Sausal's glands, no vulvar lesions, no cervical lesions, good vaginal support, physiologic discharge present, normal appearing perineal body and perianal region  BIMANUAL: uterus normal size, shape and consistency, no adnexal masses and non-tender  RECTOVAGINAL: rectovaginal exam negative for any masses or nodularity.  NEURO: alert and oriented x3,exam grossly non-focal  EXTREMITIES: normal        ASSESSMENT:  Annual exam    Postmenopausal bleeding: ordered an US and will schedule a biopsy      PLAN:  Education reviewed and Recommended: Safe Sex/STD Prevention, Smoking Cessation, Self Breast Exams, Calcium Supplements, Weight Bearing Exercise, Low Fat, and Low  Cholesterol Diet, Skin Cancer Screening, Depression Evaluation,     Hormone Replacement Therapy: Risks and Benefits Reviewed.  Mammogram ordered.  Repeat Pap every 3-5 years if negative, yearly if history of abnormal Pap or cervical cancer.  HPV typing discussed with patient. No history of ESTELLA exposure. New Pap smear recommendations discussed.  STD counseling and prevention reviewed. Condom use encouraged.  HPV vaccine completed.  Counseled the patient on the appropriate screening modalities for life threatening condition such as breast disease-mammogram starting at 40 years of age, lipid profile, Pap smear.  Discussion of the reproductive plan/contraception.  Physical activity discussed.  Counseled the patient on health/risk behavior/injury prevention/safety belts and helmets/ occupational and recreational hazards.  Counseled on cardiovascular risk factors (Family hx, htn, dyslipidemia, obesity,diabetes, life style modifications.  Routine Health Maintenance through patient  PCP  Follow up one year and as needed.  .     Jeffery Peters MD           This note was produced with voice recognition software and may contain errors in grammar, spelling and content.  If any questions, please feel free to contact me.     I was accompanied by Female Chaperone, LPN  during the exam

## 2024-02-08 ENCOUNTER — HOSPITAL ENCOUNTER (OUTPATIENT)
Dept: RADIOLOGY | Facility: CLINIC | Age: 54
Discharge: HOME | End: 2024-02-08
Payer: COMMERCIAL

## 2024-02-08 VITALS — WEIGHT: 275 LBS | BODY MASS INDEX: 43.16 KG/M2 | HEIGHT: 67 IN

## 2024-02-08 DIAGNOSIS — N95.0 PMB (POSTMENOPAUSAL BLEEDING): ICD-10-CM

## 2024-02-08 DIAGNOSIS — Z30.09 ENCOUNTER FOR COUNSELING REGARDING CONTRACEPTION: ICD-10-CM

## 2024-02-08 PROCEDURE — 77067 SCR MAMMO BI INCL CAD: CPT

## 2024-02-08 PROCEDURE — 76856 US EXAM PELVIC COMPLETE: CPT

## 2024-02-20 ENCOUNTER — APPOINTMENT (OUTPATIENT)
Dept: OBSTETRICS AND GYNECOLOGY | Facility: CLINIC | Age: 54
End: 2024-02-20
Payer: COMMERCIAL

## 2024-02-21 ENCOUNTER — PROCEDURE VISIT (OUTPATIENT)
Dept: OBSTETRICS AND GYNECOLOGY | Facility: CLINIC | Age: 54
End: 2024-02-21
Payer: COMMERCIAL

## 2024-02-21 VITALS — SYSTOLIC BLOOD PRESSURE: 140 MMHG | DIASTOLIC BLOOD PRESSURE: 88 MMHG | BODY MASS INDEX: 43.62 KG/M2 | WEIGHT: 278.5 LBS

## 2024-02-21 DIAGNOSIS — N95.0 PMB (POSTMENOPAUSAL BLEEDING): Primary | ICD-10-CM

## 2024-02-21 PROCEDURE — 88305 TISSUE EXAM BY PATHOLOGIST: CPT | Mod: TC | Performed by: OBSTETRICS & GYNECOLOGY

## 2024-02-21 PROCEDURE — 58558 HYSTEROSCOPY BIOPSY: CPT | Performed by: OBSTETRICS & GYNECOLOGY

## 2024-02-21 PROCEDURE — 88305 TISSUE EXAM BY PATHOLOGIST: CPT | Performed by: PATHOLOGY

## 2024-02-21 ASSESSMENT — ENCOUNTER SYMPTOMS
DEPRESSION: 0
LOSS OF SENSATION IN FEET: 0
OCCASIONAL FEELINGS OF UNSTEADINESS: 0

## 2024-02-21 ASSESSMENT — PAIN SCALES - GENERAL: PAINLEVEL: 8

## 2024-02-21 ASSESSMENT — PATIENT HEALTH QUESTIONNAIRE - PHQ9
2. FEELING DOWN, DEPRESSED OR HOPELESS: NOT AT ALL
1. LITTLE INTEREST OR PLEASURE IN DOING THINGS: NOT AT ALL
SUM OF ALL RESPONSES TO PHQ9 QUESTIONS 1 AND 2: 0

## 2024-02-21 NOTE — PROGRESS NOTES
Hysteroscopy And Biopsy         Consent: Yes.          Prep: betadine.          Endocervix Canal normal.          Uterine Cavity normal uterine cavity. Indentation from fibroid anteriorly          Tubal Ostia seen bilaterally.          Endometrial Biopsy performed.          Sounding  8 cm.          Tolerance of Procedure: well.     Endometrial biopsy         Performed by: Jeffery Peters MD  Authorized by: Jeffery Peters MD    Consent:     Consent obtained: verbal    Consent given by: patient    Risks discussed: bleeding, damage to other organs, conversion to surgery, infection, need for repeat procedure, pain and loss of function    Alternatives discussed: alternative treatment    Patient agrees, verbalizes understanding, and wants to proceed: yes    Indications:     Indications: abnormal uterine bleeding          Chronicity of post-coital bleeding: recurrent    Progression of post-coital bleeding: worsening  Pre-procedure:     Urine pregnancy test: negative      Premeds: acetaminophen    Procedure:     A bimanual exam was performed: yes      Uterus size: 6-8 weeks    Uterus position: anteverted    Prepped with: Betadine    Tenaculum used: yes      A local block was performed: no      Local anesthetic: none    Cervix dilated: no      Number of passes: 3  Findings:     Cervix: normal      Uterus depth by sound (cm): 8    Specimen collected: specimen collected and sent to pathology      Patient tolerance: tolerated well, no immediate complications

## 2024-02-23 LAB
LABORATORY COMMENT REPORT: NORMAL
PATH REPORT.FINAL DX SPEC: NORMAL
PATH REPORT.GROSS SPEC: NORMAL
PATH REPORT.RELEVANT HX SPEC: NORMAL
PATH REPORT.TOTAL CANCER: NORMAL

## 2024-02-26 LAB
CYTOLOGY CMNT CVX/VAG CYTO-IMP: NORMAL
LAB AP HPV GENOTYPE QUESTION: YES
LAB AP HPV HR: NORMAL
LABORATORY COMMENT REPORT: NORMAL
LABORATORY COMMENT REPORT: NORMAL
PATH REPORT.TOTAL CANCER: NORMAL

## 2024-03-04 ENCOUNTER — OFFICE VISIT (OUTPATIENT)
Dept: PRIMARY CARE | Facility: CLINIC | Age: 54
End: 2024-03-04
Payer: COMMERCIAL

## 2024-03-04 VITALS
HEART RATE: 67 BPM | DIASTOLIC BLOOD PRESSURE: 78 MMHG | BODY MASS INDEX: 42.91 KG/M2 | OXYGEN SATURATION: 97 % | SYSTOLIC BLOOD PRESSURE: 136 MMHG | TEMPERATURE: 97.3 F | WEIGHT: 274 LBS

## 2024-03-04 DIAGNOSIS — E78.5 DYSLIPIDEMIA: ICD-10-CM

## 2024-03-04 DIAGNOSIS — L84 CALLUS OF FOOT: ICD-10-CM

## 2024-03-04 DIAGNOSIS — E11.9 TYPE 2 DIABETES MELLITUS WITHOUT COMPLICATION, WITHOUT LONG-TERM CURRENT USE OF INSULIN (MULTI): Primary | ICD-10-CM

## 2024-03-04 DIAGNOSIS — I10 BENIGN ESSENTIAL HYPERTENSION: ICD-10-CM

## 2024-03-04 DIAGNOSIS — Z13.228 SCREENING FOR METABOLIC DISORDER: ICD-10-CM

## 2024-03-04 LAB — POC HEMOGLOBIN A1C: 7.6 % (ref 4.2–6.5)

## 2024-03-04 PROCEDURE — 99213 OFFICE O/P EST LOW 20 MIN: CPT | Performed by: INTERNAL MEDICINE

## 2024-03-04 PROCEDURE — 3078F DIAST BP <80 MM HG: CPT | Performed by: INTERNAL MEDICINE

## 2024-03-04 PROCEDURE — 3075F SYST BP GE 130 - 139MM HG: CPT | Performed by: INTERNAL MEDICINE

## 2024-03-04 PROCEDURE — 4010F ACE/ARB THERAPY RXD/TAKEN: CPT | Performed by: INTERNAL MEDICINE

## 2024-03-04 PROCEDURE — 83036 HEMOGLOBIN GLYCOSYLATED A1C: CPT | Mod: QW,91 | Performed by: INTERNAL MEDICINE

## 2024-03-04 RX ORDER — DEXTROSE 4 G
TABLET,CHEWABLE ORAL
Qty: 1 EACH | Refills: 0 | Status: SHIPPED | OUTPATIENT
Start: 2024-03-04

## 2024-03-04 RX ORDER — BLOOD SUGAR DIAGNOSTIC
1 STRIP MISCELLANEOUS 2 TIMES DAILY
Qty: 200 EACH | Refills: 1 | Status: SHIPPED | OUTPATIENT
Start: 2024-03-04 | End: 2024-06-02

## 2024-03-04 ASSESSMENT — ENCOUNTER SYMPTOMS
ABDOMINAL PAIN: 0
POLYPHAGIA: 0
NUMBNESS: 0
POLYDIPSIA: 0
BACK PAIN: 1
DYSURIA: 0
OCCASIONAL FEELINGS OF UNSTEADINESS: 0
CHILLS: 0
FATIGUE: 0
BLOOD IN STOOL: 0
MYALGIAS: 0
COUGH: 0
PALPITATIONS: 0
VOMITING: 0
ARTHRALGIAS: 1
TROUBLE SWALLOWING: 0
DEPRESSION: 0
NAUSEA: 0
TREMORS: 0
WHEEZING: 0
LOSS OF SENSATION IN FEET: 0
SINUS PRESSURE: 0
FREQUENCY: 0
SEIZURES: 0
SHORTNESS OF BREATH: 0

## 2024-03-04 ASSESSMENT — PATIENT HEALTH QUESTIONNAIRE - PHQ9
SUM OF ALL RESPONSES TO PHQ9 QUESTIONS 1 AND 2: 0
2. FEELING DOWN, DEPRESSED OR HOPELESS: NOT AT ALL
1. LITTLE INTEREST OR PLEASURE IN DOING THINGS: NOT AT ALL

## 2024-03-04 ASSESSMENT — PAIN SCALES - GENERAL: PAINLEVEL: 7

## 2024-03-04 NOTE — PROGRESS NOTES
Subjective   Patient ID: Camille Romero is a 53 y.o. female who presents for Follow-up (4mon follow up).    HPI     Has history of hypertension, hyperlipidemia, type 2 diabetes mellitus, depression, coronary artery disease, migraine headaches.         History of coronary artery disease status post CABG in 2013-follows with cardiologist Dr Bejarano. Was advised to follow up as needed.         History of diabetes mellitus-compliant with medications. Denied any hypoglycemic episodes. Has free style Melissa.         History of hyperlipidemia-compliant with medications.         H/O Migraine headaches- Taking Amitriptyline and migraine headaches.         Takes Gabapentin for neuropathy and migraine headaches.         H/O Tobacco dependence- smokes pack every 2-3 days. Started smoking when she was 15 years old.         Seeing Dr Has for left knee pain- taking Medrol dose sherly. She was told that-she has mild meniscal tear, she will go for repair if no improvement in next few months.   Seeing Dr Mckay for low back pain, had spinal block 2 weeks ago   Complaining of nasal conegstion, with post nasal drainage- advised to try flonase OTC    Review of Systems   Constitutional:  Negative for chills and fatigue.   HENT:  Negative for postnasal drip, sinus pressure and trouble swallowing.    Respiratory:  Negative for cough, shortness of breath and wheezing.    Cardiovascular:  Negative for chest pain, palpitations and leg swelling.   Gastrointestinal:  Negative for abdominal pain, blood in stool, nausea and vomiting.   Endocrine: Negative for polydipsia, polyphagia and polyuria.   Genitourinary:  Negative for dysuria and frequency.   Musculoskeletal:  Positive for arthralgias and back pain. Negative for myalgias.   Skin:  Negative for rash.   Neurological:  Negative for tremors, seizures and numbness.   Psychiatric/Behavioral:  Negative for behavioral problems.        Objective   /78 (BP Location: Left arm, Patient  Position: Sitting, BP Cuff Size: Adult)   Pulse 67   Temp 36.3 °C (97.3 °F) (Temporal)   Wt 124 kg (274 lb)   SpO2 97%   BMI 42.91 kg/m²     Physical Exam  Constitutional:       General: She is not in acute distress.     Appearance: Normal appearance.   HENT:      Head: Normocephalic and atraumatic.   Eyes:      Extraocular Movements: Extraocular movements intact.      Pupils: Pupils are equal, round, and reactive to light.   Cardiovascular:      Rate and Rhythm: Normal rate and regular rhythm.      Heart sounds: Normal heart sounds. No murmur heard.     No friction rub.   Pulmonary:      Effort: Pulmonary effort is normal.      Breath sounds: Normal breath sounds. No wheezing or rales.   Abdominal:      General: Bowel sounds are normal.      Palpations: Abdomen is soft.      Tenderness: There is no abdominal tenderness. There is no guarding.   Musculoskeletal:         General: Normal range of motion.      Right lower leg: No edema.      Left lower leg: No edema.      Comments: Uses cane to walk   Skin:     Findings: No rash.   Neurological:      Mental Status: She is alert.      Cranial Nerves: No cranial nerve deficit.   Psychiatric:         Mood and Affect: Mood normal.       Assessment/Plan       Camille was seen today for follow-up.  Diagnoses and all orders for this visit:  Type 2 diabetes mellitus without complication, without long-term current use of insulin (CMS/[facility]) (Primary)  -     POCT glycosylated hemoglobin (Hb A1C) manually resulted  -     Referral to Diabetic Education; Future  -     Albumin , Urine Random; Future  -     blood-glucose meter misc; Twice daily  -     OneTouch Ultra Test strip; Inject 1 each under the skin 2 times a day.  Dyslipidemia  -     Lipid Panel; Future  Benign essential hypertension  -     CBC and Auto Differential; Future  -     Comprehensive Metabolic Panel; Future  Screening for metabolic disorder  -     TSH with reflex to Free T4 if abnormal; Future  Callus of foot  -      Referral to Podiatry; Future     Reviewed home blood sugar readings    Lab Results   Component Value Date    HGBA1C 7.6 (A) 03/04/2024          Current Outpatient Medications   Medication Instructions   • albuterol (Proventil HFA) 90 mcg/actuation inhaler 2 puffs, inhalation, Every 4 hours PRN   • amitriptyline (ELAVIL) 50 mg, oral, Nightly   • blood-glucose meter misc Twice daily   • butalbital-acetaminophen-caff -40 mg tablet  TAKE 1 TO 2 TABLET BY MOUTH EVERY 4 HOURS AS NEEDED F   • carvedilol (COREG) 12.5 mg, oral, 2 times daily, With food   • cyanocobalamin, vitamin B-12, (VITAMIN B-12 ORAL) 1000 MCG  as directed Orally   • dulaglutide 3 mg, subcutaneous, Weekly   • ergocalciferol (Vitamin D-2) 1.25 MG (43507 UT) capsule 1 capsule, oral, Weekly   • escitalopram (LEXAPRO) 20 mg, oral, Daily   • ezetimibe (ZETIA) 10 mg, oral, Daily   • FreeStyle Lancets 28 gauge USE AS DIRECTED EVERY 12 HOURS   • Gralise 600 mg tablet extended release 24 hr 2 tablets, oral, Daily   • hydroCHLOROthiazide (MICROZIDE) 12.5 mg, oral, Every morning   • norethindrone (AYGESTIN) 5 mg, oral, Daily   • OneTouch Delica Plus Lancet 33 gauge misc 1 each, subcutaneous, 2 times daily   • OneTouch Ultra Test strip 1 each, subcutaneous, 2 times daily   • potassium chloride CR 10 mEq ER tablet 10 mEq, oral, 2 times daily after meals   • pravastatin (PRAVACHOL) 80 mg, oral, Daily   • topiramate (TOPAMAX) 200 mg, oral, Nightly   • valsartan (DIOVAN) 40 mg, oral, Daily   • verapamil SR (CALAN-SR) 120 mg, oral, Daily

## 2024-03-08 ENCOUNTER — OFFICE VISIT (OUTPATIENT)
Dept: OBSTETRICS AND GYNECOLOGY | Facility: CLINIC | Age: 54
End: 2024-03-08
Payer: COMMERCIAL

## 2024-03-08 ENCOUNTER — PREP FOR PROCEDURE (OUTPATIENT)
Dept: OBSTETRICS AND GYNECOLOGY | Facility: CLINIC | Age: 54
End: 2024-03-08

## 2024-03-08 VITALS — SYSTOLIC BLOOD PRESSURE: 139 MMHG | DIASTOLIC BLOOD PRESSURE: 79 MMHG | WEIGHT: 279.1 LBS | BODY MASS INDEX: 43.71 KG/M2

## 2024-03-08 DIAGNOSIS — N95.0 PMB (POSTMENOPAUSAL BLEEDING): Primary | ICD-10-CM

## 2024-03-08 DIAGNOSIS — D21.9 FIBROID: ICD-10-CM

## 2024-03-08 DIAGNOSIS — N76.0 ACUTE VAGINITIS: ICD-10-CM

## 2024-03-08 PROCEDURE — 87205 SMEAR GRAM STAIN: CPT | Performed by: OBSTETRICS & GYNECOLOGY

## 2024-03-08 PROCEDURE — 3075F SYST BP GE 130 - 139MM HG: CPT | Performed by: OBSTETRICS & GYNECOLOGY

## 2024-03-08 PROCEDURE — 3078F DIAST BP <80 MM HG: CPT | Performed by: OBSTETRICS & GYNECOLOGY

## 2024-03-08 PROCEDURE — 4010F ACE/ARB THERAPY RXD/TAKEN: CPT | Performed by: OBSTETRICS & GYNECOLOGY

## 2024-03-08 PROCEDURE — 99214 OFFICE O/P EST MOD 30 MIN: CPT | Performed by: OBSTETRICS & GYNECOLOGY

## 2024-03-08 ASSESSMENT — ENCOUNTER SYMPTOMS
DEPRESSION: 0
LOSS OF SENSATION IN FEET: 0
OCCASIONAL FEELINGS OF UNSTEADINESS: 0

## 2024-03-08 ASSESSMENT — PAIN SCALES - GENERAL: PAINLEVEL: 7

## 2024-03-08 NOTE — PROGRESS NOTES
Camille Romero is a 53 y.o. female,  who presented with  pelvic pain    Fibroid  PMB    She had biopsy and she is still cramping and bleeding   Vaginal discharge         Obstetrical History:  OB History          3    Para   3    Term   2       1    AB        Living   3         SAB        IAB        Ectopic        Multiple        Live Births                           History reviewed. No pertinent past medical history.  History reviewed. No pertinent surgical history.  Family History   Problem Relation Name Age of Onset    Hypertension Mother      Stroke Mother          CVA    Diabetes Father      Hypertension Father      Heart disease Father      Cancer Father      Heart disease Father's Brother      Other (stents) Father's Brother      Other (defribrillator) Father's Brother       Social History     Tobacco Use    Smoking status: Every Day     Types: Cigarettes    Smokeless tobacco: Never   Vaping Use    Vaping Use: Never used   Substance Use Topics    Alcohol use: Yes     Alcohol/week: 1.0 standard drink of alcohol     Types: 1 Glasses of wine per week    Drug use: Never     Social History     Tobacco Use   Smoking Status Every Day    Types: Cigarettes   Smokeless Tobacco Never       Current Outpatient Medications on File Prior to Visit   Medication Sig Dispense Refill    albuterol (Proventil HFA) 90 mcg/actuation inhaler Inhale 2 puffs every 4 hours if needed.      amitriptyline (Elavil) 25 mg tablet Take 2 tablets (50 mg) by mouth once daily at bedtime. 180 tablet 0    blood-glucose meter misc Twice daily 1 each 0    butalbital-acetaminophen-caff -40 mg tablet TAKE 1 TO 2 TABLET BY MOUTH EVERY 4 HOURS AS NEEDED F      carvedilol (Coreg) 12.5 mg tablet Take 1 tablet (12.5 mg) by mouth 2 times a day. With food 180 tablet 0    cyanocobalamin, vitamin B-12, (VITAMIN B-12 ORAL) 1000 MCG  as directed Orally      dulaglutide 3 mg/0.5 mL pen injector Inject 3 mg under the skin 1 (one)  time per week. 2 mL 1    ergocalciferol (Vitamin D-2) 1.25 MG (26925 UT) capsule Take 1 capsule (1,250 mcg) by mouth 1 (one) time per week.      escitalopram (Lexapro) 20 mg tablet Take 1 tablet (20 mg) by mouth once daily.      ezetimibe (Zetia) 10 mg tablet Take 1 tablet (10 mg) by mouth once daily. 90 tablet 2    FreeStyle Lancets 28 gauge USE AS DIRECTED EVERY 12 HOURS      Gralise 600 mg tablet extended release 24 hr Take 2 tablets by mouth once daily.      hydroCHLOROthiazide (Microzide) 12.5 mg capsule Take 1 capsule (12.5 mg) by mouth once daily in the morning. 90 capsule 0    norethindrone (Aygestin) 5 mg tablet Take 1 tablet (5 mg) by mouth once daily. 30 tablet 1    OneTouch Delica Plus Lancet 33 gauge misc Inject 1 each under the skin 2 times a day. 200 each 1    OneTouch Ultra Test strip Inject 1 each under the skin 2 times a day. 200 each 1    potassium chloride CR 10 mEq ER tablet Take 1 tablet (10 mEq) by mouth 2 times a day after meals. 180 tablet 0    pravastatin (Pravachol) 80 mg tablet Take 1 tablet (80 mg) by mouth once daily. 90 tablet 0    topiramate (Topamax) 100 mg tablet Take 2 tablets (200 mg) by mouth once daily at bedtime.      valsartan (Diovan) 40 mg tablet Take 1 tablet (40 mg) by mouth once daily. 90 tablet 0    verapamil SR (Calan-SR) 120 mg ER tablet Take 1 tablet (120 mg) by mouth once daily.      [DISCONTINUED] OneTouch Ultra Test strip Inject 1 each under the skin 2 times a day. 200 each 1    [DISCONTINUED] Trulicity 1.5 mg/0.5 mL pen injector injection Inject entire syringe subcutaneously weekly 2 mL 2     No current facility-administered medications on file prior to visit.     Allergies   Allergen Reactions    Latex Anaphylaxis and Shortness of breath    Aspirin Hives    Bupropion Hcl Other and Nausea Only    Pertussis Vaccines Hives    Diphtheria, Pertussis, Tetanus Vaccine Hives    Nickel Rash and Swelling    Penicillins Hives and Swelling         REVIEW OF  SYSTEMS:    General: No weight loss, malaise or fevers or other constitutional symptoms.  Neuro: No history of TIA's, stroke,.  No neurological symptoms or problems. No visual changes  Respiratory: No history of respiratory/pulmonary symptoms or problems.  Cardiovascular: No history of cardiovascular symptoms or problems.  GI: No history of GI symptoms or problems.   : No history of UTI in past 6 weeks.  No history of  symptoms or problems.  BREASTS: No skin dimpling, nipple discharge or masses.  Endocrine: No history of diabetes. No Thyroid symptoms  Hematology: No history of bleeding or clotting disorder.  Skin: Negative for lesions, rash, and itching.      PHYSICAL EXAMINATION:    /79   Wt 127 kg (279 lb 1.6 oz)   BMI 43.71 kg/m²   GENERAL: pleasant, female in no apparent distress  HEENT: Normocephalic, atraumatic, mucus membranes moist and no lesions  NEURO: alert and oriented x3,exam grossly non-focal  NECK: Supple, full range of motion, no adenopathy and thyroid normal  DERMATOLOGY: Normal, without lesions, non-icteric and non-hirsute       ABDOMEN: soft, non-tender and no masses  PELVIC: external genitalia normal, normal Bartholin's glands, urethra, [place]'s glands, no vulvar lesions, no cervical lesions, good vaginal support, physiologic discharge present, normal appearing perineal body and perianal region  BIMANUAL: uterus bulky   size, shape and consistency, no adnexal masses and non-tender        ASSESSMENT and Plan:    Camille Romero is a 53 y.o. female,  who  presented with pelvic pain , cramping and PMB    Failed medical management   Informed consent for total laparoscopic hysterectomy , bilateral salpingioophorectomy     The following risk were reviewed with the patient including, but not limited to, the risk of bleeding, infection, uterine perforation, chronic pain, fistula formation, urethritis, injury to other organs that may require additional surgery, future surgery to be  performed by another surgeon or specialist, the possible need for colostomy and urostomy bags, neuropathy, paralysis, stroke, DVT, pulmonary complications and death.  The patient also understands that these risks can lead to possible need for prolonged hospitalization and ICU admission.  The patient understands that there are alternative including but not limited to having surgery, medical management.  The patient accepts the above risk and desires to proceed with surgery         Jeffery Peters MD

## 2024-03-09 LAB
CLUE CELLS VAG LPF-#/AREA: NORMAL /[LPF]
NUGENT SCORE: 0
YEAST VAG WET PREP-#/AREA: NORMAL

## 2024-03-11 PROBLEM — D21.9 FIBROID: Status: ACTIVE | Noted: 2024-03-08

## 2024-03-11 PROBLEM — N95.0 PMB (POSTMENOPAUSAL BLEEDING): Status: ACTIVE | Noted: 2024-03-08

## 2024-03-26 ENCOUNTER — TELEPHONE (OUTPATIENT)
Dept: CARE COORDINATION | Facility: CLINIC | Age: 54
End: 2024-03-26
Payer: COMMERCIAL

## 2024-03-26 NOTE — PROGRESS NOTES
Spoke with patient and she is scheduled for 4/4@10:00.  Patient denies having elevated A1C-feels it is related to her pain medication.

## 2024-03-28 DIAGNOSIS — E11.9 TYPE 2 DIABETES MELLITUS WITHOUT COMPLICATION, WITHOUT LONG-TERM CURRENT USE OF INSULIN (MULTI): ICD-10-CM

## 2024-03-28 RX ORDER — DULAGLUTIDE 3 MG/.5ML
INJECTION, SOLUTION SUBCUTANEOUS
Qty: 2 ML | Refills: 0 | Status: SHIPPED | OUTPATIENT
Start: 2024-03-28 | End: 2024-05-21 | Stop reason: HOSPADM

## 2024-04-02 ENCOUNTER — TELEPHONE (OUTPATIENT)
Dept: CARE COORDINATION | Facility: CLINIC | Age: 54
End: 2024-04-02
Payer: COMMERCIAL

## 2024-04-02 NOTE — PROGRESS NOTES
Called patient to confirm receipt of education material; she has received and plans to keep appointment for 4/4@10:00 by phone.

## 2024-04-04 ENCOUNTER — CLINICAL SUPPORT (OUTPATIENT)
Dept: CARE COORDINATION | Facility: CLINIC | Age: 54
End: 2024-04-04
Payer: COMMERCIAL

## 2024-04-05 NOTE — PROGRESS NOTES
Patient seen for initial DSME;  patient reports taking Trulicity, usually on Sunday.  Uses Melissa 2 CM as well as 1 touch Ultra 2 for finger sticks.  Patient reports fasting BG typically in 115-130mg/range; patient reports she always eats dessert (before bed usually ice cream with a couple cookies or couple cookies with chocolate milk) because despite eating dessert she states hhe CGM alarms 30 minutes after going to bed with range between 57-69mg/dl and treats with juice or candy.   Patient typically does not eat breakfast, just coffee with sugar free Coffe[place]ate; although this morning she had zucchini bread with I can't believe it Not Butter spread.    Lunch/Brunch depending on time she gets up:  usually bowel of Honey Nut Cheerios pt estimated to be 1.5 cups with 1 cup whole milk, or will have Ritz Crackers with peanut butter and milk.  Reports no snacks between meals.  Dinner: today 1/2 Sub[employer] Sub, water with [place] drops.  More often has Meat, potatoes, noodles or rice, and non-starchy vegetable.  Activity level reported to be very low due to herniated disc/sciatica, spinal stenosis L2-L3. Focus today on dietary recommendation and label reading as she only focused on fat, cholesterol, sodium, calcium and sugar/added sugar.  Reviewed carb recommendations of 30 g/CHO breakfast, 45g/Lunch and Dinner.  Strongly recommended protein with all carb intake except when treating lows.  Also discussed nutrient pacing and resistant starch impact on BG.  Strongly encouraged to increase higher fiber foods for ultimate goal of 25 grams/day and to include adequate water. Patient agrees to share CGM data via [employer]; invite sent to: [email or web address].  Follow up appointment on 4/23@10:00 to review CGM Summary Report and provide additional education/support

## 2024-04-15 DIAGNOSIS — Z30.09 ENCOUNTER FOR COUNSELING REGARDING CONTRACEPTION: ICD-10-CM

## 2024-04-15 RX ORDER — NORETHINDRONE 5 MG/1
5 TABLET ORAL DAILY
Qty: 30 TABLET | Refills: 1 | Status: SHIPPED | OUTPATIENT
Start: 2024-04-15 | End: 2024-05-21 | Stop reason: HOSPADM

## 2024-04-19 DIAGNOSIS — E87.6 HYPOKALEMIA: ICD-10-CM

## 2024-04-19 DIAGNOSIS — I10 PRIMARY HYPERTENSION: ICD-10-CM

## 2024-04-19 DIAGNOSIS — E11.9 TYPE 2 DIABETES MELLITUS WITHOUT COMPLICATION, WITHOUT LONG-TERM CURRENT USE OF INSULIN (MULTI): ICD-10-CM

## 2024-04-19 RX ORDER — CARVEDILOL 12.5 MG/1
12.5 TABLET ORAL 2 TIMES DAILY
Qty: 180 TABLET | Refills: 1 | Status: SHIPPED | OUTPATIENT
Start: 2024-04-19 | End: 2024-10-16

## 2024-04-19 RX ORDER — VALSARTAN 40 MG/1
40 TABLET ORAL DAILY
Qty: 90 TABLET | Refills: 1 | Status: SHIPPED | OUTPATIENT
Start: 2024-04-19

## 2024-04-19 RX ORDER — HYDROCHLOROTHIAZIDE 12.5 MG/1
12.5 CAPSULE ORAL EVERY MORNING
Qty: 90 CAPSULE | Refills: 1 | Status: SHIPPED | OUTPATIENT
Start: 2024-04-19 | End: 2024-10-16

## 2024-04-19 RX ORDER — POTASSIUM CHLORIDE 750 MG/1
10 TABLET, FILM COATED, EXTENDED RELEASE ORAL
Qty: 180 TABLET | Refills: 1 | Status: SHIPPED | OUTPATIENT
Start: 2024-04-19 | End: 2024-05-21 | Stop reason: HOSPADM

## 2024-04-19 RX ORDER — AMITRIPTYLINE HYDROCHLORIDE 25 MG/1
50 TABLET, FILM COATED ORAL NIGHTLY
Qty: 180 TABLET | Refills: 1 | Status: SHIPPED | OUTPATIENT
Start: 2024-04-19 | End: 2024-10-16

## 2024-04-23 DIAGNOSIS — E78.2 MIXED HYPERLIPIDEMIA: ICD-10-CM

## 2024-04-23 RX ORDER — PRAVASTATIN SODIUM 80 MG/1
80 TABLET ORAL DAILY
Qty: 90 TABLET | Refills: 0 | Status: SHIPPED | OUTPATIENT
Start: 2024-04-23

## 2024-04-29 ENCOUNTER — TELEPHONE (OUTPATIENT)
Dept: PRIMARY CARE | Facility: CLINIC | Age: 54
End: 2024-04-29

## 2024-04-29 DIAGNOSIS — E11.9 TYPE 2 DIABETES MELLITUS WITHOUT COMPLICATION, WITHOUT LONG-TERM CURRENT USE OF INSULIN (MULTI): Primary | ICD-10-CM

## 2024-05-06 ENCOUNTER — PRE-ADMISSION TESTING (OUTPATIENT)
Dept: PREADMISSION TESTING | Facility: HOSPITAL | Age: 54
End: 2024-05-06
Payer: COMMERCIAL

## 2024-05-06 VITALS
RESPIRATION RATE: 16 BRPM | OXYGEN SATURATION: 99 % | SYSTOLIC BLOOD PRESSURE: 149 MMHG | TEMPERATURE: 97.2 F | HEART RATE: 67 BPM | WEIGHT: 276 LBS | HEIGHT: 69 IN | BODY MASS INDEX: 40.88 KG/M2 | DIASTOLIC BLOOD PRESSURE: 75 MMHG

## 2024-05-06 DIAGNOSIS — Z01.818 PRE-OP TESTING: Primary | ICD-10-CM

## 2024-05-06 LAB
ABO GROUP (TYPE) IN BLOOD: NORMAL
ANION GAP SERPL CALC-SCNC: 9 MMOL/L
ANTIBODY SCREEN: NORMAL
BASOPHILS # BLD AUTO: 0.06 X10*3/UL (ref 0–0.1)
BASOPHILS NFR BLD AUTO: 0.5 %
BUN SERPL-MCNC: 8 MG/DL (ref 8–25)
CALCIUM SERPL-MCNC: 9.3 MG/DL (ref 8.5–10.4)
CHLORIDE SERPL-SCNC: 104 MMOL/L (ref 97–107)
CO2 SERPL-SCNC: 27 MMOL/L (ref 24–31)
CREAT SERPL-MCNC: 0.9 MG/DL (ref 0.4–1.6)
EGFRCR SERPLBLD CKD-EPI 2021: 77 ML/MIN/1.73M*2
EOSINOPHIL # BLD AUTO: 0.38 X10*3/UL (ref 0–0.7)
EOSINOPHIL NFR BLD AUTO: 3.4 %
ERYTHROCYTE [DISTWIDTH] IN BLOOD BY AUTOMATED COUNT: 13.4 % (ref 11.5–14.5)
GLUCOSE SERPL-MCNC: 175 MG/DL (ref 65–99)
HCT VFR BLD AUTO: 51.7 % (ref 36–46)
HGB BLD-MCNC: 17.2 G/DL (ref 12–16)
IMM GRANULOCYTES # BLD AUTO: 0.04 X10*3/UL (ref 0–0.7)
IMM GRANULOCYTES NFR BLD AUTO: 0.4 % (ref 0–0.9)
LYMPHOCYTES # BLD AUTO: 3.4 X10*3/UL (ref 1.2–4.8)
LYMPHOCYTES NFR BLD AUTO: 30.3 %
MCH RBC QN AUTO: 30.7 PG (ref 26–34)
MCHC RBC AUTO-ENTMCNC: 33.3 G/DL (ref 32–36)
MCV RBC AUTO: 92 FL (ref 80–100)
MONOCYTES # BLD AUTO: 0.57 X10*3/UL (ref 0.1–1)
MONOCYTES NFR BLD AUTO: 5.1 %
NEUTROPHILS # BLD AUTO: 6.78 X10*3/UL (ref 1.2–7.7)
NEUTROPHILS NFR BLD AUTO: 60.3 %
NRBC BLD-RTO: 0 /100 WBCS (ref 0–0)
PLATELET # BLD AUTO: 289 X10*3/UL (ref 150–450)
POTASSIUM SERPL-SCNC: 4.8 MMOL/L (ref 3.4–5.1)
RBC # BLD AUTO: 5.6 X10*6/UL (ref 4–5.2)
RH FACTOR (ANTIGEN D): NORMAL
SODIUM SERPL-SCNC: 140 MMOL/L (ref 133–145)
WBC # BLD AUTO: 11.2 X10*3/UL (ref 4.4–11.3)

## 2024-05-06 PROCEDURE — 86901 BLOOD TYPING SEROLOGIC RH(D): CPT

## 2024-05-06 PROCEDURE — 99204 OFFICE O/P NEW MOD 45 MIN: CPT | Performed by: PHYSICIAN ASSISTANT

## 2024-05-06 PROCEDURE — 93010 ELECTROCARDIOGRAM REPORT: CPT | Performed by: INTERNAL MEDICINE

## 2024-05-06 PROCEDURE — 36415 COLL VENOUS BLD VENIPUNCTURE: CPT

## 2024-05-06 PROCEDURE — 85025 COMPLETE CBC W/AUTO DIFF WBC: CPT

## 2024-05-06 PROCEDURE — 80048 BASIC METABOLIC PNL TOTAL CA: CPT

## 2024-05-06 PROCEDURE — 93005 ELECTROCARDIOGRAM TRACING: CPT

## 2024-05-06 ASSESSMENT — ENCOUNTER SYMPTOMS
ENDOCRINE NEGATIVE: 1
LIGHT-HEADEDNESS: 1
CARDIOVASCULAR NEGATIVE: 1
PSYCHIATRIC NEGATIVE: 1
ROS GI COMMENTS: SEE HPI
ARTHRALGIAS: 1
FATIGUE: 1
RESPIRATORY NEGATIVE: 1
EYES NEGATIVE: 1
DIZZINESS: 1
BACK PAIN: 1

## 2024-05-06 ASSESSMENT — DUKE ACTIVITY SCORE INDEX (DASI)
CAN YOU CLIMB A FLIGHT OF STAIRS OR WALK UP A HILL: YES
CAN YOU HAVE SEXUAL RELATIONS: YES
TOTAL_SCORE: 34.7
DASI METS SCORE: 7
CAN YOU DO HEAVY WORK AROUND THE HOUSE LIKE SCRUBBING FLOORS OR LIFTING AND MOVING HEAVY FURNITURE: NO
CAN YOU PARTICIPATE IN MODERATE RECREATIONAL ACTIVITIES LIKE GOLF, BOWLING, DANCING, DOUBLES TENNIS OR THROWING A BASEBALL OR FOOTBALL: YES
CAN YOU DO YARD WORK LIKE RAKING LEAVES, WEEDING OR PUSHING A MOWER: YES
CAN YOU DO MODERATE WORK AROUND THE HOUSE LIKE VACUUMING, SWEEPING FLOORS OR CARRYING GROCERIES: YES
CAN YOU RUN A SHORT DISTANCE: NO
CAN YOU TAKE CARE OF YOURSELF (EAT, DRESS, BATHE, OR USE TOILET): YES
CAN YOU WALK INDOORS, SUCH AS AROUND YOUR HOUSE: YES
CAN YOU WALK A BLOCK OR TWO ON LEVEL GROUND: YES
CAN YOU PARTICIPATE IN STRENOUS SPORTS LIKE SWIMMING, SINGLES TENNIS, FOOTBALL, BASKETBALL, OR SKIING: NO
CAN YOU DO LIGHT WORK AROUND THE HOUSE LIKE DUSTING OR WASHING DISHES: YES

## 2024-05-06 NOTE — CPM/PAT H&P
"CPM/PAT Evaluation       Name: Camille Romero (Camlile Romero)  /Age: 1970/53 y.o.     In-Person       Chief Complaint: \"heavy bleeding\"    HPI  The patient is a 53 year old female.  She has a period every month, but only lasting for 2 days.  She has a few hours of heavy bleeding and then spotting.  She has associated bloating, cramping and fatigue.  She was seen by Dr. Peters and was diagnosed with fibroids.  Surgical intervention is recommended.    Past Medical History:   Diagnosis Date    Anxiety     Asthma (HHS-HCC)     Coronary artery disease     Depression     GERD (gastroesophageal reflux disease)     Hyperlipidemia     Hypertension     Myocardial infarction (Multi)     Obesity     Sleep apnea     Type 2 diabetes mellitus (Multi)        Past Surgical History:   Procedure Laterality Date    ADENOIDECTOMY      COLONOSCOPY      CORONARY ARTERY BYPASS GRAFT  2009    X 1 graft    TONSILLECTOMY       Family History   Problem Relation Name Age of Onset    Hypertension Mother      Stroke Mother          CVA    Diabetes Father      Hypertension Father      Heart disease Father      Cancer Father      Heart disease Father's Brother      Other (stents) Father's Brother      Other (defribrillator) Father's Brother       Social History     Tobacco Use    Smoking status: Every Day     Current packs/day: 0.50     Average packs/day: 0.5 packs/day for 38.3 years (19.2 ttl pk-yrs)     Types: Cigarettes     Start date:     Smokeless tobacco: Never   Substance Use Topics    Alcohol use: Not Currently     Alcohol/week: 5.0 standard drinks of alcohol     Types: 5 Standard drinks or equivalent per week     Social History     Substance and Sexual Activity   Drug Use Never     Allergies   Allergen Reactions    Latex Anaphylaxis and Shortness of breath    Aspirin Hives    Bupropion Hcl Other and Nausea Only    Pertussis Vaccines Hives    Diphtheria, Pertussis, Tetanus Vaccine Hives    Nickel Rash and Swelling    " Penicillins Hives and Swelling     Current Outpatient Medications   Medication Sig Dispense Refill    albuterol (Proventil HFA) 90 mcg/actuation inhaler Inhale 2 puffs every 4 hours if needed.      amitriptyline (Elavil) 25 mg tablet Take 2 tablets (50 mg) by mouth once daily at bedtime. 180 tablet 1    blood-glucose meter misc Twice daily 1 each 0    butalbital-acetaminophen-caff -40 mg tablet TAKE 1 TO 2 TABLET BY MOUTH EVERY 4 HOURS AS NEEDED F      carvedilol (Coreg) 12.5 mg tablet Take 1 tablet (12.5 mg) by mouth 2 times a day. With food (Patient taking differently: Take 1 tablet (12.5 mg) by mouth once daily at bedtime. PT ONLY TAKES ONCE DAILY) 180 tablet 1    cyanocobalamin, vitamin B-12, (VITAMIN B-12 ORAL) 1000 MCG  as directed Orally      ergocalciferol (Vitamin D-2) 1.25 MG (48359 UT) capsule Take 1 capsule (1,250 mcg) by mouth 1 (one) time per week. THURSDAY      escitalopram (Lexapro) 20 mg tablet Take 1 tablet (20 mg) by mouth once daily at bedtime.      ezetimibe (Zetia) 10 mg tablet Take 1 tablet (10 mg) by mouth once daily. 90 tablet 2    FreeStyle Lancets 28 gauge USE AS DIRECTED EVERY 12 HOURS      Gralise 600 mg tablet extended release 24 hr Take 2 tablets by mouth once daily at bedtime.      hydroCHLOROthiazide (Microzide) 12.5 mg capsule Take 1 capsule (12.5 mg) by mouth once daily in the morning. 90 capsule 1    norethindrone (Aygestin) 5 mg tablet Take 1 tablet (5 mg) by mouth once daily. 30 tablet 1    OneTouch Delica Plus Lancet 33 gauge misc Inject 1 each under the skin 2 times a day. 200 each 1    OneTouch Ultra Test strip Inject 1 each under the skin 2 times a day. 200 each 1    potassium chloride CR 10 mEq ER tablet Take 1 tablet (10 mEq) by mouth 2 times a day after meals. 180 tablet 1    pravastatin (Pravachol) 80 mg tablet TAKE ONE TABLET BY MOUTH ONCE A DAY 90 tablet 0    semaglutide 0.25 mg or 0.5 mg (2 mg/3 mL) pen injector Inject 0.25 mg under the skin 1 (one) time per  "week. From week 5 start taking 0.5 mg every week 3 mL 2    topiramate (Topamax) 100 mg tablet Take 2 tablets (200 mg) by mouth once daily at bedtime.      Trulicity 3 mg/0.5 mL pen injector INJECT 3 MG UNDER THE SKIN 1 TIME PER WEEK (Patient not taking: Reported on 5/6/2024) 2 mL 0    valsartan (Diovan) 40 mg tablet Take 1 tablet (40 mg) by mouth once daily. 90 tablet 1    verapamil SR (Calan-SR) 120 mg ER tablet Take 1 tablet (120 mg) by mouth once daily at bedtime.       No current facility-administered medications for this visit.     Review of Systems   Constitutional:  Positive for fatigue.   HENT: Negative.     Eyes: Negative.    Respiratory: Negative.     Cardiovascular: Negative.    Gastrointestinal:         See HPI   Endocrine: Negative.    Genitourinary: Negative.    Musculoskeletal:  Positive for arthralgias and back pain.   Neurological:  Positive for dizziness and light-headedness.        History of near-syncope episodes   Psychiatric/Behavioral: Negative.       /75   Pulse 67   Temp 36.2 °C (97.2 °F) (Temporal)   Resp 16   Ht 1.74 m (5' 8.5\")   Wt 125 kg (276 lb)   SpO2 99%   BMI 41.36 kg/m²     Physical Exam  Vitals reviewed.   Constitutional:       Comments: Overweight     HENT:      Head: Normocephalic and atraumatic.      Mouth/Throat:      Mouth: Mucous membranes are moist.      Pharynx: Oropharynx is clear.   Eyes:      Extraocular Movements: Extraocular movements intact.      Pupils: Pupils are equal, round, and reactive to light.   Cardiovascular:      Rate and Rhythm: Normal rate and regular rhythm.      Heart sounds: Normal heart sounds.   Pulmonary:      Breath sounds: Normal breath sounds.   Abdominal:      General: Bowel sounds are normal.      Palpations: Abdomen is soft.   Musculoskeletal:         General: No swelling.   Skin:     General: Skin is warm and dry.   Neurological:      General: No focal deficit present.      Mental Status: She is alert and oriented to person, " place, and time.   Psychiatric:         Mood and Affect: Mood normal.         Behavior: Behavior normal.        PAT AIRWAY:   Airway:     Mallampati::  III    TM distance::  >3 FB    Neck ROM::  Full   Teeth intact    ASA:  III  DASI RISK SCORE:  34.7  METS SCORE:  7  CHAD2 SCORE:  4.0%  REVISED CARDIAC RISK INDEX:  0.4%  STOP BANG SCORE:  7    Assessment and Plan:     Postmenopausal bleeding, fibroid:  Laparoscopic hysterectomy with bilateral salpingo-oophorectomy, rigid cystoscopy  H/O myocardial infarction 2009  CAD - s/p CABG X 1 graft 2009 - CARDIAC CLEARANCE 5/13/2024 - DR. DIANA ODELL-SCANNED INTO MEDIA TAB  Diabetes mellitus - hemoglobin A1c was 7.6 on 3/4/2024  Exercise induced asthma - managed with rescue inhaler  Obstructive sleep apnea - non-compliant with CPAP  Hypertension  Morbid obesity - BMI:  41.36    CATRACHITA LagunasC

## 2024-05-06 NOTE — PREPROCEDURE INSTRUCTIONS
PAT DISCHARGE INSTRUCTIONS    Please call the Same Day Surgery (SDS) Department of the hospital where your procedure will be performed after 2:00 PM the day before your surgery. If you are scheduled on a Monday, or a Tuesday following a Monday holiday, you will need to call on the last business day prior to your surgery.    [facility]  [address], [address]  [phone]    [facility]  [address] [place] [address] [phone]    [facility]  [address]  [place] [id number]  [phone]    Please let your surgeon know if:      You develop any open sores, shingles, burning or painful urination as these may increase your risk of an infection.   You no longer wish to have the surgery.   Any other personal circumstances change that may lead to the need to cancel or defer this surgery-such as being sick or getting admitted to any hospital within one week of your planned procedure.    Your contact details change, such as a change of address or phone number.    Starting now:     Please DO NOT drink alcohol or smoke for 24 hours before surgery. It is well known that quitting smoking can make a huge difference to your health and recovery from surgery. The longer you abstain from smoking, the better your chances of a healthy recovery. If you need help with quitting, call 4-800-QUIT-NOW to be connected to a trained counselor who will discuss the best methods to help you quit.     Before your surgery:    Please stop all supplements 7 days prior to surgery. Or as directed by your surgeon.   Please stop taking NSAID pain medicine such as Advil and Motrin 7 days before surgery.    If you develop any fever, cough, cold, rashes, cuts, scratches, scrapes, urinary symptoms or infection anywhere on your body (including teeth and gums) prior to surgery, please call your surgeon’s office as soon as  possible. This may require treatment to reduce the chance of cancellation on the day of surgery.    The day before your surgery:   Get a good night’s rest.  Use the special soap for bathing if you have been instructed to use one.    Scheduled surgery times may change and you will be notified if this occurs - please check your personal voicemail for any updates.     On the morning of surgery:   Wear comfortable, loose fitting clothes which open in the front. Please do not wear moisturizers, creams, lotions, makeup or perfume.    Please bring with you to surgery:   Photo ID and insurance card   Current list of medicines and allergies   Pacemaker/ Defibrillator/Heart stent cards   CPAP machine and mask    Slings/ splints/ crutches   A copy of your complete advanced directive/DHPOA.    Please do NOT bring with you to surgery:   All jewelry and valuables should be left at home.   Prosthetic devices such as contact lenses, hearing aids, dentures, eyelash extensions, hairpins and body piercings must be removed prior to going in to the surgical suite.    After outpatient surgery:   A responsible adult MUST accompany you at the time of discharge and stay with you for 24 hours after your surgery. You may NOT drive yourself home after surgery.    Do not drive, operate machinery, make critical decisions or do activities that require co-ordination or balance until after a night’s sleep.   Do not drink alcoholic beverages for 24 hours.   Instructions for resuming your medications will be provided by your surgeon.    CALL YOUR DOCTOR AFTER SURGERY IF YOU HAVE:     Chills and/or a fever of 101° F or higher.    Redness, swelling, pus or drainage from your surgical wound or a bad smell from the wound.    Lightheadedness, fainting or confusion.    Persistent vomiting (throwing up) and are not able to eat or drink for 12 hours.    Three or more loose, watery bowel movements in 24 hours (diarrhea).   Difficulty or pain while urinating(  after non-urological surgery)    Pain and swelling in your legs, especially if it is only on one side.    Difficulty breathing or are breathing faster than normal.    Any new concerning symptoms.            Why must I stop eating and drinking near surgery time?  With sedation, food or liquid in your stomach can enter your lungs causing serious complications  Increases nausea and vomiting    When do I need to stop eating and drinking before my surgery?   Do not eat or drink after midnight the night before your surgery/procedure.  You may have small sips of water to take your medication.     Medication List            Accurate as of May 6, 2024  9:23 AM. Always use your most recent med list.                amitriptyline 25 mg tablet  Commonly known as: Elavil  Take 2 tablets (50 mg) by mouth once daily at bedtime.  Medication Adjustments for Surgery: Other (Comment)  Notes to patient: CONTINUE PER USUAL/TAKE NIGHT BEFORE SURGERY     blood-glucose meter misc  Twice daily     butalbital-acetaminophen-caff -40 mg tablet  Medication Adjustments for Surgery: Other (Comment)  Notes to patient: CONTINUE IF NEEDED     carvedilol 12.5 mg tablet  Commonly known as: Coreg  Take 1 tablet (12.5 mg) by mouth 2 times a day. With food  Medication Adjustments for Surgery: Other (Comment)  Notes to patient: CONTINUE PER USUAL/TAKE NIGHT BEFORE SURGERY     ergocalciferol 1.25 MG (52058 UT) capsule  Commonly known as: Vitamin D-2  Medication Adjustments for Surgery: Stop 7 days before surgery     escitalopram 20 mg tablet  Commonly known as: Lexapro  Medication Adjustments for Surgery: Other (Comment)  Notes to patient: CONTINUE PER USUAL/TAKE NIGHT BEFORE SURGERY     ezetimibe 10 mg tablet  Commonly known as: Zetia  Take 1 tablet (10 mg) by mouth once daily.  Medication Adjustments for Surgery: Other (Comment)  Notes to patient: CONTINUE PER USUAL/TAKE NIGHT BEFORE SURGERY     * FreeStyle Lancets 28 gauge  Generic drug: FreeStyle  lancets     * OneTouch Delica Plus Lancet 33 gauge misc  Generic drug: lancets  Inject 1 each under the skin 2 times a day.     Gralise 600 mg tablet extended release 24 hr  Generic drug: gabapentin  Medication Adjustments for Surgery: Other (Comment)  Notes to patient: CONTINUE PER USUAL/TAKE NIGHT BEFORE SURGERY     hydroCHLOROthiazide 12.5 mg capsule  Commonly known as: Microzide  Take 1 capsule (12.5 mg) by mouth once daily in the morning.  Medication Adjustments for Surgery: Take morning of surgery with sip of water, no other fluids     norethindrone 5 mg tablet  Commonly known as: Aygestin  Take 1 tablet (5 mg) by mouth once daily.  Medication Adjustments for Surgery: Other (Comment)  Notes to patient: HOLD DAY OF SURGERY     OneTouch Ultra Test strip  Generic drug: blood sugar diagnostic  Inject 1 each under the skin 2 times a day.     potassium chloride CR 10 mEq ER tablet  Commonly known as: Klor-Con  Take 1 tablet (10 mEq) by mouth 2 times a day after meals.  Medication Adjustments for Surgery: Other (Comment)  Notes to patient: CONTINUE PER USUAL/TAKE NIGHT BEFORE SURGERY     pravastatin 80 mg tablet  Commonly known as: Pravachol  TAKE ONE TABLET BY MOUTH ONCE A DAY  Medication Adjustments for Surgery: Other (Comment)  Notes to patient: CONTINUE PER USUAL/TAKE NIGHT BEFORE SURGERY     Proventil HFA 90 mcg/actuation inhaler  Generic drug: albuterol     semaglutide 0.25 mg or 0.5 mg (2 mg/3 mL) pen injector  Inject 0.25 mg under the skin 1 (one) time per week. From week 5 start taking 0.5 mg every week  Medication Adjustments for Surgery: Stop 7 days before surgery  Notes to patient: LAST DOSE MAY 12/HOLD MAY 19 DOSE     topiramate 100 mg tablet  Commonly known as: Topamax  Medication Adjustments for Surgery: Other (Comment)  Notes to patient: CONTINUE PER USUAL/TAKE NIGHT BEFORE SURGERY     Trulicity 3 mg/0.5 mL pen injector  Generic drug: dulaglutide  INJECT 3 MG UNDER THE SKIN 1 TIME PER WEEK  Notes to  patient: PT NOT TAKING     valsartan 40 mg tablet  Commonly known as: Diovan  Take 1 tablet (40 mg) by mouth once daily.  Medication Adjustments for Surgery: Other (Comment)  Notes to patient: HOLD DAY OF SURGERY     verapamil  mg ER tablet  Commonly known as: Calan-SR  Medication Adjustments for Surgery: Other (Comment)  Notes to patient: CONTINUE PER USUAL/TAKE NIGHT BEFORE SURGERY     VITAMIN B-12 ORAL  Medication Adjustments for Surgery: Stop 7 days before surgery           * This list has 2 medication(s) that are the same as other medications prescribed for you. Read the directions carefully, and ask your doctor or other care provider to review them with you.

## 2024-05-07 LAB
ATRIAL RATE: 68 BPM
P AXIS: 33 DEGREES
P OFFSET: 183 MS
P ONSET: 130 MS
PR INTERVAL: 146 MS
Q ONSET: 203 MS
QRS COUNT: 12 BEATS
QRS DURATION: 92 MS
QT INTERVAL: 410 MS
QTC CALCULATION(BAZETT): 435 MS
QTC FREDERICIA: 427 MS
R AXIS: 57 DEGREES
T AXIS: 58 DEGREES
T OFFSET: 408 MS
VENTRICULAR RATE: 68 BPM

## 2024-05-20 ENCOUNTER — ANESTHESIA (OUTPATIENT)
Dept: OPERATING ROOM | Facility: HOSPITAL | Age: 54
End: 2024-05-20
Payer: COMMERCIAL

## 2024-05-20 ENCOUNTER — ANESTHESIA EVENT (OUTPATIENT)
Dept: OPERATING ROOM | Facility: HOSPITAL | Age: 54
End: 2024-05-20
Payer: COMMERCIAL

## 2024-05-20 ENCOUNTER — HOSPITAL ENCOUNTER (OUTPATIENT)
Facility: HOSPITAL | Age: 54
Discharge: HOME | End: 2024-05-21
Attending: OBSTETRICS & GYNECOLOGY | Admitting: OBSTETRICS & GYNECOLOGY
Payer: COMMERCIAL

## 2024-05-20 DIAGNOSIS — Z90.710 HISTORY OF TOTAL HYSTERECTOMY: Primary | ICD-10-CM

## 2024-05-20 DIAGNOSIS — D21.9 FIBROID: ICD-10-CM

## 2024-05-20 DIAGNOSIS — N95.0 PMB (POSTMENOPAUSAL BLEEDING): ICD-10-CM

## 2024-05-20 LAB
ABO GROUP (TYPE) IN BLOOD: NORMAL
GLUCOSE BLD MANUAL STRIP-MCNC: 159 MG/DL (ref 74–99)
GLUCOSE BLD MANUAL STRIP-MCNC: 193 MG/DL (ref 74–99)
GLUCOSE BLD MANUAL STRIP-MCNC: 198 MG/DL (ref 74–99)
PREGNANCY TEST URINE, POC: NEGATIVE
RH FACTOR (ANTIGEN D): NORMAL

## 2024-05-20 PROCEDURE — 2500000004 HC RX 250 GENERAL PHARMACY W/ HCPCS (ALT 636 FOR OP/ED): Performed by: NURSE ANESTHETIST, CERTIFIED REGISTERED

## 2024-05-20 PROCEDURE — 2500000004 HC RX 250 GENERAL PHARMACY W/ HCPCS (ALT 636 FOR OP/ED): Performed by: OBSTETRICS & GYNECOLOGY

## 2024-05-20 PROCEDURE — 2500000004 HC RX 250 GENERAL PHARMACY W/ HCPCS (ALT 636 FOR OP/ED): Performed by: ANESTHESIOLOGY

## 2024-05-20 PROCEDURE — 36415 COLL VENOUS BLD VENIPUNCTURE: CPT | Performed by: OBSTETRICS & GYNECOLOGY

## 2024-05-20 PROCEDURE — 3700000002 HC GENERAL ANESTHESIA TIME - EACH INCREMENTAL 1 MINUTE: Performed by: OBSTETRICS & GYNECOLOGY

## 2024-05-20 PROCEDURE — 82947 ASSAY GLUCOSE BLOOD QUANT: CPT

## 2024-05-20 PROCEDURE — 88307 TISSUE EXAM BY PATHOLOGIST: CPT | Performed by: PATHOLOGY

## 2024-05-20 PROCEDURE — 7100000011 HC EXTENDED STAY RECOVERY HOURLY - NURSING UNIT

## 2024-05-20 PROCEDURE — 2500000005 HC RX 250 GENERAL PHARMACY W/O HCPCS: Performed by: NURSE ANESTHETIST, CERTIFIED REGISTERED

## 2024-05-20 PROCEDURE — 2720000007 HC OR 272 NO HCPCS: Performed by: OBSTETRICS & GYNECOLOGY

## 2024-05-20 PROCEDURE — A58571 PR LAPAROSCOPY W TOT HYSTERECTUTERUS <=250 GRAM  W TUBE/OVARY: Performed by: NURSE ANESTHETIST, CERTIFIED REGISTERED

## 2024-05-20 PROCEDURE — 58571 TLH W/T/O 250 G OR LESS: CPT | Performed by: OBSTETRICS & GYNECOLOGY

## 2024-05-20 PROCEDURE — 57100 BIOPSY VAGINAL MUCOSA SIMPLE: CPT | Performed by: OBSTETRICS & GYNECOLOGY

## 2024-05-20 PROCEDURE — 2500000006 HC RX 250 W HCPCS SELF ADMINISTERED DRUGS (ALT 637 FOR ALL PAYERS): Performed by: OBSTETRICS & GYNECOLOGY

## 2024-05-20 PROCEDURE — 88305 TISSUE EXAM BY PATHOLOGIST: CPT | Performed by: PATHOLOGY

## 2024-05-20 PROCEDURE — 82947 ASSAY GLUCOSE BLOOD QUANT: CPT | Mod: 59

## 2024-05-20 PROCEDURE — 7100000002 HC RECOVERY ROOM TIME - EACH INCREMENTAL 1 MINUTE: Performed by: OBSTETRICS & GYNECOLOGY

## 2024-05-20 PROCEDURE — 88331 PATH CONSLTJ SURG 1 BLK 1SPC: CPT | Performed by: PATHOLOGY

## 2024-05-20 PROCEDURE — 2500000005 HC RX 250 GENERAL PHARMACY W/O HCPCS: Performed by: OBSTETRICS & GYNECOLOGY

## 2024-05-20 PROCEDURE — 88332 PATH CONSLTJ SURG EA ADD BLK: CPT | Performed by: PATHOLOGY

## 2024-05-20 PROCEDURE — 2500000001 HC RX 250 WO HCPCS SELF ADMINISTERED DRUGS (ALT 637 FOR MEDICARE OP): Performed by: OBSTETRICS & GYNECOLOGY

## 2024-05-20 PROCEDURE — 94762 N-INVAS EAR/PLS OXIMTRY CONT: CPT

## 2024-05-20 PROCEDURE — 2500000004 HC RX 250 GENERAL PHARMACY W/ HCPCS (ALT 636 FOR OP/ED): Mod: JZ | Performed by: OBSTETRICS & GYNECOLOGY

## 2024-05-20 PROCEDURE — 2500000004 HC RX 250 GENERAL PHARMACY W/ HCPCS (ALT 636 FOR OP/ED): Performed by: STUDENT IN AN ORGANIZED HEALTH CARE EDUCATION/TRAINING PROGRAM

## 2024-05-20 PROCEDURE — 88307 TISSUE EXAM BY PATHOLOGIST: CPT | Mod: TC | Performed by: OBSTETRICS & GYNECOLOGY

## 2024-05-20 PROCEDURE — A58571 PR LAPAROSCOPY W TOT HYSTERECTUTERUS <=250 GRAM  W TUBE/OVARY: Performed by: ANESTHESIOLOGY

## 2024-05-20 PROCEDURE — 3700000001 HC GENERAL ANESTHESIA TIME - INITIAL BASE CHARGE: Performed by: OBSTETRICS & GYNECOLOGY

## 2024-05-20 PROCEDURE — 2500000001 HC RX 250 WO HCPCS SELF ADMINISTERED DRUGS (ALT 637 FOR MEDICARE OP): Performed by: NURSE ANESTHETIST, CERTIFIED REGISTERED

## 2024-05-20 PROCEDURE — 3600000004 HC OR TIME - INITIAL BASE CHARGE - PROCEDURE LEVEL FOUR: Performed by: OBSTETRICS & GYNECOLOGY

## 2024-05-20 PROCEDURE — 3600000009 HC OR TIME - EACH INCREMENTAL 1 MINUTE - PROCEDURE LEVEL FOUR: Performed by: OBSTETRICS & GYNECOLOGY

## 2024-05-20 PROCEDURE — 7100000001 HC RECOVERY ROOM TIME - INITIAL BASE CHARGE: Performed by: OBSTETRICS & GYNECOLOGY

## 2024-05-20 PROCEDURE — 81025 URINE PREGNANCY TEST: CPT | Performed by: ANESTHESIOLOGY

## 2024-05-20 PROCEDURE — 96372 THER/PROPH/DIAG INJ SC/IM: CPT | Performed by: OBSTETRICS & GYNECOLOGY

## 2024-05-20 PROCEDURE — G0378 HOSPITAL OBSERVATION PER HR: HCPCS

## 2024-05-20 RX ORDER — ALBUTEROL SULFATE 0.83 MG/ML
2.5 SOLUTION RESPIRATORY (INHALATION) 3 TIMES DAILY
Status: DISCONTINUED | OUTPATIENT
Start: 2024-05-21 | End: 2024-05-21

## 2024-05-20 RX ORDER — ENOXAPARIN SODIUM 100 MG/ML
40 INJECTION SUBCUTANEOUS EVERY 12 HOURS SCHEDULED
Status: DISCONTINUED | OUTPATIENT
Start: 2024-05-20 | End: 2024-05-21 | Stop reason: HOSPADM

## 2024-05-20 RX ORDER — TRAMADOL HYDROCHLORIDE 50 MG/1
100 TABLET ORAL EVERY 6 HOURS PRN
Status: DISCONTINUED | OUTPATIENT
Start: 2024-05-20 | End: 2024-05-21 | Stop reason: HOSPADM

## 2024-05-20 RX ORDER — VERAPAMIL HYDROCHLORIDE 120 MG/1
120 TABLET, FILM COATED, EXTENDED RELEASE ORAL NIGHTLY
Status: DISCONTINUED | OUTPATIENT
Start: 2024-05-20 | End: 2024-05-21 | Stop reason: HOSPADM

## 2024-05-20 RX ORDER — BUTALBITAL, ACETAMINOPHEN AND CAFFEINE 50; 325; 40 MG/1; MG/1; MG/1
1 TABLET ORAL EVERY 4 HOURS PRN
Status: DISCONTINUED | OUTPATIENT
Start: 2024-05-20 | End: 2024-05-21 | Stop reason: HOSPADM

## 2024-05-20 RX ORDER — POLYETHYLENE GLYCOL 3350 17 G/17G
17 POWDER, FOR SOLUTION ORAL DAILY
Status: DISCONTINUED | OUTPATIENT
Start: 2024-05-20 | End: 2024-05-21 | Stop reason: HOSPADM

## 2024-05-20 RX ORDER — HYDRALAZINE HYDROCHLORIDE 20 MG/ML
5 INJECTION INTRAMUSCULAR; INTRAVENOUS
Status: DISCONTINUED | OUTPATIENT
Start: 2024-05-20 | End: 2024-05-20 | Stop reason: HOSPADM

## 2024-05-20 RX ORDER — ROCURONIUM BROMIDE 10 MG/ML
INJECTION, SOLUTION INTRAVENOUS AS NEEDED
Status: DISCONTINUED | OUTPATIENT
Start: 2024-05-20 | End: 2024-05-20

## 2024-05-20 RX ORDER — ONDANSETRON HYDROCHLORIDE 2 MG/ML
4 INJECTION, SOLUTION INTRAVENOUS EVERY 6 HOURS PRN
Status: DISCONTINUED | OUTPATIENT
Start: 2024-05-20 | End: 2024-05-21 | Stop reason: HOSPADM

## 2024-05-20 RX ORDER — AMITRIPTYLINE HYDROCHLORIDE 25 MG/1
50 TABLET, FILM COATED ORAL NIGHTLY
Status: DISCONTINUED | OUTPATIENT
Start: 2024-05-20 | End: 2024-05-21 | Stop reason: HOSPADM

## 2024-05-20 RX ORDER — SODIUM CHLORIDE, SODIUM LACTATE, POTASSIUM CHLORIDE, CALCIUM CHLORIDE 600; 310; 30; 20 MG/100ML; MG/100ML; MG/100ML; MG/100ML
100 INJECTION, SOLUTION INTRAVENOUS CONTINUOUS
Status: DISCONTINUED | OUTPATIENT
Start: 2024-05-20 | End: 2024-05-20 | Stop reason: HOSPADM

## 2024-05-20 RX ORDER — ONDANSETRON HYDROCHLORIDE 2 MG/ML
4 INJECTION, SOLUTION INTRAVENOUS ONCE AS NEEDED
Status: DISCONTINUED | OUTPATIENT
Start: 2024-05-20 | End: 2024-05-20 | Stop reason: HOSPADM

## 2024-05-20 RX ORDER — TOPIRAMATE 100 MG/1
200 TABLET, FILM COATED ORAL NIGHTLY
Status: DISCONTINUED | OUTPATIENT
Start: 2024-05-20 | End: 2024-05-21 | Stop reason: HOSPADM

## 2024-05-20 RX ORDER — LIDOCAINE HYDROCHLORIDE 10 MG/ML
INJECTION INFILTRATION; PERINEURAL AS NEEDED
Status: DISCONTINUED | OUTPATIENT
Start: 2024-05-20 | End: 2024-05-20

## 2024-05-20 RX ORDER — MIDAZOLAM HYDROCHLORIDE 1 MG/ML
INJECTION, SOLUTION INTRAMUSCULAR; INTRAVENOUS AS NEEDED
Status: DISCONTINUED | OUTPATIENT
Start: 2024-05-20 | End: 2024-05-20

## 2024-05-20 RX ORDER — ALBUTEROL SULFATE 0.83 MG/ML
2.5 SOLUTION RESPIRATORY (INHALATION) EVERY 2 HOUR PRN
Status: DISCONTINUED | OUTPATIENT
Start: 2024-05-20 | End: 2024-05-21 | Stop reason: HOSPADM

## 2024-05-20 RX ORDER — CARVEDILOL 12.5 MG/1
12.5 TABLET ORAL NIGHTLY
Status: DISCONTINUED | OUTPATIENT
Start: 2024-05-20 | End: 2024-05-21 | Stop reason: HOSPADM

## 2024-05-20 RX ORDER — MIDAZOLAM HYDROCHLORIDE 1 MG/ML
1 INJECTION, SOLUTION INTRAMUSCULAR; INTRAVENOUS ONCE
Status: DISCONTINUED | OUTPATIENT
Start: 2024-05-20 | End: 2024-05-20 | Stop reason: HOSPADM

## 2024-05-20 RX ORDER — PRAVASTATIN SODIUM 40 MG/1
80 TABLET ORAL DAILY
Status: DISCONTINUED | OUTPATIENT
Start: 2024-05-20 | End: 2024-05-21 | Stop reason: HOSPADM

## 2024-05-20 RX ORDER — GABAPENTIN 600 MG/1
2 TABLET, FILM COATED ORAL NIGHTLY
Status: DISCONTINUED | OUTPATIENT
Start: 2024-05-20 | End: 2024-05-21 | Stop reason: HOSPADM

## 2024-05-20 RX ORDER — EZETIMIBE 10 MG/1
10 TABLET ORAL DAILY
Status: DISCONTINUED | OUTPATIENT
Start: 2024-05-20 | End: 2024-05-21 | Stop reason: HOSPADM

## 2024-05-20 RX ORDER — ALBUTEROL SULFATE 0.83 MG/ML
2.5 SOLUTION RESPIRATORY (INHALATION) EVERY 4 HOURS PRN
Status: DISCONTINUED | OUTPATIENT
Start: 2024-05-20 | End: 2024-05-20

## 2024-05-20 RX ORDER — HYDROCHLOROTHIAZIDE 12.5 MG/1
12.5 CAPSULE ORAL EVERY MORNING
Status: DISCONTINUED | OUTPATIENT
Start: 2024-05-21 | End: 2024-05-21 | Stop reason: HOSPADM

## 2024-05-20 RX ORDER — SODIUM CHLORIDE, SODIUM LACTATE, POTASSIUM CHLORIDE, CALCIUM CHLORIDE 600; 310; 30; 20 MG/100ML; MG/100ML; MG/100ML; MG/100ML
INJECTION, SOLUTION INTRAVENOUS CONTINUOUS PRN
Status: DISCONTINUED | OUTPATIENT
Start: 2024-05-20 | End: 2024-05-20

## 2024-05-20 RX ORDER — LIDOCAINE HYDROCHLORIDE AND EPINEPHRINE 10; 10 MG/ML; UG/ML
INJECTION, SOLUTION INFILTRATION; PERINEURAL AS NEEDED
Status: DISCONTINUED | OUTPATIENT
Start: 2024-05-20 | End: 2024-05-20 | Stop reason: HOSPADM

## 2024-05-20 RX ORDER — MEPERIDINE HYDROCHLORIDE 25 MG/ML
12.5 INJECTION INTRAMUSCULAR; INTRAVENOUS; SUBCUTANEOUS EVERY 10 MIN PRN
Status: DISCONTINUED | OUTPATIENT
Start: 2024-05-20 | End: 2024-05-20 | Stop reason: HOSPADM

## 2024-05-20 RX ORDER — LIDOCAINE HYDROCHLORIDE 10 MG/ML
0.1 INJECTION INFILTRATION; PERINEURAL ONCE
Status: DISCONTINUED | OUTPATIENT
Start: 2024-05-20 | End: 2024-05-20 | Stop reason: HOSPADM

## 2024-05-20 RX ORDER — GENTAMICIN 40 MG/ML
INJECTION, SOLUTION INTRAMUSCULAR; INTRAVENOUS AS NEEDED
Status: DISCONTINUED | OUTPATIENT
Start: 2024-05-20 | End: 2024-05-20

## 2024-05-20 RX ORDER — PROPOFOL 10 MG/ML
INJECTION, EMULSION INTRAVENOUS AS NEEDED
Status: DISCONTINUED | OUTPATIENT
Start: 2024-05-20 | End: 2024-05-20

## 2024-05-20 RX ORDER — CLINDAMYCIN PHOSPHATE 900 MG/50ML
900 INJECTION, SOLUTION INTRAVENOUS ONCE
Status: COMPLETED | OUTPATIENT
Start: 2024-05-20 | End: 2024-05-20

## 2024-05-20 RX ORDER — ALBUTEROL SULFATE 0.83 MG/ML
2.5 SOLUTION RESPIRATORY (INHALATION) ONCE
Status: DISCONTINUED | OUTPATIENT
Start: 2024-05-20 | End: 2024-05-20 | Stop reason: HOSPADM

## 2024-05-20 RX ORDER — VALSARTAN 80 MG/1
40 TABLET ORAL DAILY
Status: DISCONTINUED | OUTPATIENT
Start: 2024-05-20 | End: 2024-05-21 | Stop reason: HOSPADM

## 2024-05-20 RX ORDER — SODIUM CHLORIDE, SODIUM LACTATE, POTASSIUM CHLORIDE, CALCIUM CHLORIDE 600; 310; 30; 20 MG/100ML; MG/100ML; MG/100ML; MG/100ML
40 INJECTION, SOLUTION INTRAVENOUS CONTINUOUS
Status: DISCONTINUED | OUTPATIENT
Start: 2024-05-20 | End: 2024-05-21 | Stop reason: HOSPADM

## 2024-05-20 RX ORDER — NALOXONE HYDROCHLORIDE 0.4 MG/ML
0.1 INJECTION, SOLUTION INTRAMUSCULAR; INTRAVENOUS; SUBCUTANEOUS EVERY 5 MIN PRN
Status: DISCONTINUED | OUTPATIENT
Start: 2024-05-20 | End: 2024-05-21 | Stop reason: HOSPADM

## 2024-05-20 RX ORDER — ONDANSETRON HYDROCHLORIDE 2 MG/ML
INJECTION, SOLUTION INTRAVENOUS AS NEEDED
Status: DISCONTINUED | OUTPATIENT
Start: 2024-05-20 | End: 2024-05-20

## 2024-05-20 RX ORDER — METOPROLOL TARTRATE 1 MG/ML
INJECTION, SOLUTION INTRAVENOUS AS NEEDED
Status: DISCONTINUED | OUTPATIENT
Start: 2024-05-20 | End: 2024-05-20

## 2024-05-20 RX ORDER — ESCITALOPRAM OXALATE 20 MG/1
20 TABLET ORAL NIGHTLY
Status: DISCONTINUED | OUTPATIENT
Start: 2024-05-20 | End: 2024-05-21 | Stop reason: HOSPADM

## 2024-05-20 RX ORDER — SIMETHICONE 80 MG
80 TABLET,CHEWABLE ORAL 4 TIMES DAILY PRN
Status: DISCONTINUED | OUTPATIENT
Start: 2024-05-20 | End: 2024-05-21 | Stop reason: HOSPADM

## 2024-05-20 RX ORDER — FENTANYL CITRATE 50 UG/ML
50 INJECTION, SOLUTION INTRAMUSCULAR; INTRAVENOUS EVERY 5 MIN PRN
Status: DISCONTINUED | OUTPATIENT
Start: 2024-05-20 | End: 2024-05-20 | Stop reason: HOSPADM

## 2024-05-20 RX ORDER — ALBUTEROL SULFATE 90 UG/1
AEROSOL, METERED RESPIRATORY (INHALATION) AS NEEDED
Status: DISCONTINUED | OUTPATIENT
Start: 2024-05-20 | End: 2024-05-20

## 2024-05-20 RX ORDER — FENTANYL CITRATE 50 UG/ML
INJECTION, SOLUTION INTRAMUSCULAR; INTRAVENOUS AS NEEDED
Status: DISCONTINUED | OUTPATIENT
Start: 2024-05-20 | End: 2024-05-20

## 2024-05-20 RX ADMIN — ROCURONIUM BROMIDE 10 MG: 10 INJECTION, SOLUTION INTRAVENOUS at 13:09

## 2024-05-20 RX ADMIN — TRAMADOL HYDROCHLORIDE 100 MG: 50 TABLET ORAL at 19:42

## 2024-05-20 RX ADMIN — GENTAMICIN SULFATE 450 MG: 40 INJECTION, SOLUTION INTRAMUSCULAR; INTRAVENOUS at 12:44

## 2024-05-20 RX ADMIN — FENTANYL CITRATE 25 MCG: 0.05 INJECTION, SOLUTION INTRAMUSCULAR; INTRAVENOUS at 15:40

## 2024-05-20 RX ADMIN — FENTANYL CITRATE 50 MCG: 0.05 INJECTION, SOLUTION INTRAMUSCULAR; INTRAVENOUS at 12:55

## 2024-05-20 RX ADMIN — PROPOFOL 170 MG: 10 INJECTION, EMULSION INTRAVENOUS at 12:16

## 2024-05-20 RX ADMIN — FENTANYL CITRATE 50 MCG: 0.05 INJECTION, SOLUTION INTRAMUSCULAR; INTRAVENOUS at 12:15

## 2024-05-20 RX ADMIN — ROCURONIUM BROMIDE 10 MG: 10 INJECTION, SOLUTION INTRAVENOUS at 14:26

## 2024-05-20 RX ADMIN — EZETIMIBE 10 MG: 10 TABLET ORAL at 20:12

## 2024-05-20 RX ADMIN — MIDAZOLAM HYDROCHLORIDE 2 MG: 1 INJECTION, SOLUTION INTRAMUSCULAR; INTRAVENOUS at 12:13

## 2024-05-20 RX ADMIN — POLYETHYLENE GLYCOL 3350 17 G: 17 POWDER, FOR SOLUTION ORAL at 20:12

## 2024-05-20 RX ADMIN — ESCITALOPRAM OXALATE 20 MG: 20 TABLET ORAL at 20:12

## 2024-05-20 RX ADMIN — PRAVASTATIN SODIUM 80 MG: 40 TABLET ORAL at 20:11

## 2024-05-20 RX ADMIN — VERAPAMIL HYDROCHLORIDE 120 MG: 120 TABLET, FILM COATED, EXTENDED RELEASE ORAL at 21:23

## 2024-05-20 RX ADMIN — ROCURONIUM BROMIDE 20 MG: 10 INJECTION, SOLUTION INTRAVENOUS at 12:52

## 2024-05-20 RX ADMIN — ENOXAPARIN SODIUM 40 MG: 40 INJECTION SUBCUTANEOUS at 20:10

## 2024-05-20 RX ADMIN — METOPROLOL TARTRATE 2.5 MG: 5 INJECTION, SOLUTION INTRAVENOUS at 14:35

## 2024-05-20 RX ADMIN — ROCURONIUM BROMIDE 10 MG: 10 INJECTION, SOLUTION INTRAVENOUS at 13:50

## 2024-05-20 RX ADMIN — SUGAMMADEX 200 MG: 100 INJECTION, SOLUTION INTRAVENOUS at 15:25

## 2024-05-20 RX ADMIN — ROCURONIUM BROMIDE 50 MG: 10 INJECTION, SOLUTION INTRAVENOUS at 12:16

## 2024-05-20 RX ADMIN — HYDROMORPHONE HYDROCHLORIDE 0.2 MG: 0.2 INJECTION, SOLUTION INTRAMUSCULAR; INTRAVENOUS; SUBCUTANEOUS at 16:24

## 2024-05-20 RX ADMIN — FENTANYL CITRATE 50 MCG: 0.05 INJECTION, SOLUTION INTRAMUSCULAR; INTRAVENOUS at 12:16

## 2024-05-20 RX ADMIN — PROPOFOL 30 MG: 10 INJECTION, EMULSION INTRAVENOUS at 12:49

## 2024-05-20 RX ADMIN — LIDOCAINE HYDROCHLORIDE 5 ML: 10 INJECTION, SOLUTION INFILTRATION; PERINEURAL at 12:16

## 2024-05-20 RX ADMIN — HYDROMORPHONE HYDROCHLORIDE 0.2 MG: 0.2 INJECTION, SOLUTION INTRAMUSCULAR; INTRAVENOUS; SUBCUTANEOUS at 16:15

## 2024-05-20 RX ADMIN — CLINDAMYCIN IN 5 PERCENT DEXTROSE 900 MG: 18 INJECTION, SOLUTION INTRAVENOUS at 12:12

## 2024-05-20 RX ADMIN — ONDANSETRON HYDROCHLORIDE 4 MG: 2 INJECTION INTRAMUSCULAR; INTRAVENOUS at 15:06

## 2024-05-20 RX ADMIN — METOPROLOL TARTRATE 2.5 MG: 5 INJECTION, SOLUTION INTRAVENOUS at 14:40

## 2024-05-20 RX ADMIN — FENTANYL CITRATE 25 MCG: 0.05 INJECTION, SOLUTION INTRAMUSCULAR; INTRAVENOUS at 15:11

## 2024-05-20 RX ADMIN — FENTANYL CITRATE 50 MCG: 0.05 INJECTION, SOLUTION INTRAMUSCULAR; INTRAVENOUS at 13:50

## 2024-05-20 RX ADMIN — HYDRALAZINE HYDROCHLORIDE 5 MG: 20 INJECTION INTRAMUSCULAR; INTRAVENOUS at 16:39

## 2024-05-20 RX ADMIN — TOPIRAMATE 200 MG: 100 TABLET, FILM COATED ORAL at 20:11

## 2024-05-20 RX ADMIN — AMITRIPTYLINE HYDROCHLORIDE 50 MG: 25 TABLET, FILM COATED ORAL at 20:11

## 2024-05-20 RX ADMIN — SODIUM CHLORIDE, POTASSIUM CHLORIDE, SODIUM LACTATE AND CALCIUM CHLORIDE: 600; 310; 30; 20 INJECTION, SOLUTION INTRAVENOUS at 12:12

## 2024-05-20 RX ADMIN — SODIUM CHLORIDE, POTASSIUM CHLORIDE, SODIUM LACTATE AND CALCIUM CHLORIDE 40 ML/HR: 600; 310; 30; 20 INJECTION, SOLUTION INTRAVENOUS at 18:45

## 2024-05-20 RX ADMIN — ROCURONIUM BROMIDE 10 MG: 10 INJECTION, SOLUTION INTRAVENOUS at 14:52

## 2024-05-20 RX ADMIN — FENTANYL CITRATE 50 MCG: 0.05 INJECTION, SOLUTION INTRAMUSCULAR; INTRAVENOUS at 13:08

## 2024-05-20 RX ADMIN — FENTANYL CITRATE 50 MCG: 0.05 INJECTION, SOLUTION INTRAMUSCULAR; INTRAVENOUS at 14:33

## 2024-05-20 RX ADMIN — HYDRALAZINE HYDROCHLORIDE 5 MG: 20 INJECTION INTRAMUSCULAR; INTRAVENOUS at 17:43

## 2024-05-20 RX ADMIN — FENTANYL CITRATE 50 MCG: 0.05 INJECTION, SOLUTION INTRAMUSCULAR; INTRAVENOUS at 13:47

## 2024-05-20 RX ADMIN — CARVEDILOL 12.5 MG: 12.5 TABLET, FILM COATED ORAL at 20:12

## 2024-05-20 RX ADMIN — BUTALBITAL, ACETAMINOPHEN AND CAFFEINE 1 TABLET: 325; 50; 40 TABLET ORAL at 20:26

## 2024-05-20 RX ADMIN — ALBUTEROL SULFATE 2 PUFF: 90 AEROSOL, METERED RESPIRATORY (INHALATION) at 15:24

## 2024-05-20 SDOH — SOCIAL STABILITY: SOCIAL INSECURITY: DO YOU FEEL ANYONE HAS EXPLOITED OR TAKEN ADVANTAGE OF YOU FINANCIALLY OR OF YOUR PERSONAL PROPERTY?: NO

## 2024-05-20 SDOH — SOCIAL STABILITY: SOCIAL INSECURITY: DO YOU FEEL UNSAFE GOING BACK TO THE PLACE WHERE YOU ARE LIVING?: NO

## 2024-05-20 SDOH — SOCIAL STABILITY: SOCIAL INSECURITY: ARE YOU OR HAVE YOU BEEN THREATENED OR ABUSED PHYSICALLY, EMOTIONALLY, OR SEXUALLY BY ANYONE?: NO

## 2024-05-20 SDOH — SOCIAL STABILITY: SOCIAL INSECURITY: HAVE YOU HAD ANY THOUGHTS OF HARMING ANYONE ELSE?: NO

## 2024-05-20 SDOH — SOCIAL STABILITY: SOCIAL INSECURITY: ARE THERE ANY APPARENT SIGNS OF INJURIES/BEHAVIORS THAT COULD BE RELATED TO ABUSE/NEGLECT?: NO

## 2024-05-20 SDOH — SOCIAL STABILITY: SOCIAL INSECURITY: HAVE YOU HAD THOUGHTS OF HARMING ANYONE ELSE?: NO

## 2024-05-20 SDOH — SOCIAL STABILITY: SOCIAL INSECURITY: ABUSE: ADULT

## 2024-05-20 SDOH — SOCIAL STABILITY: SOCIAL INSECURITY: HAS ANYONE EVER THREATENED TO HURT YOUR FAMILY OR YOUR PETS?: NO

## 2024-05-20 SDOH — SOCIAL STABILITY: SOCIAL INSECURITY: DOES ANYONE TRY TO KEEP YOU FROM HAVING/CONTACTING OTHER FRIENDS OR DOING THINGS OUTSIDE YOUR HOME?: NO

## 2024-05-20 SDOH — SOCIAL STABILITY: SOCIAL INSECURITY: WERE YOU ABLE TO COMPLETE ALL THE BEHAVIORAL HEALTH SCREENINGS?: YES

## 2024-05-20 ASSESSMENT — LIFESTYLE VARIABLES
HOW MANY STANDARD DRINKS CONTAINING ALCOHOL DO YOU HAVE ON A TYPICAL DAY: PATIENT DOES NOT DRINK
HOW OFTEN DO YOU HAVE 6 OR MORE DRINKS ON ONE OCCASION: NEVER
AUDIT-C TOTAL SCORE: 0
SKIP TO QUESTIONS 9-10: 1
AUDIT-C TOTAL SCORE: 0
HOW OFTEN DO YOU HAVE A DRINK CONTAINING ALCOHOL: NEVER

## 2024-05-20 ASSESSMENT — ACTIVITIES OF DAILY LIVING (ADL)
ADEQUATE_TO_COMPLETE_ADL: YES
DRESSING YOURSELF: INDEPENDENT
TOILETING: INDEPENDENT
BATHING: INDEPENDENT
GROOMING: INDEPENDENT
LACK_OF_TRANSPORTATION: NO
ASSISTIVE_DEVICE: CRUTCHES
WALKS IN HOME: INDEPENDENT
GROOMING: INDEPENDENT
TOILETING: INDEPENDENT
JUDGMENT_ADEQUATE_SAFELY_COMPLETE_DAILY_ACTIVITIES: YES
HEARING - LEFT EAR: FUNCTIONAL
HEARING - LEFT EAR: FUNCTIONAL
PATIENT'S MEMORY ADEQUATE TO SAFELY COMPLETE DAILY ACTIVITIES?: YES
FEEDING YOURSELF: INDEPENDENT
DRESSING YOURSELF: INDEPENDENT
BATHING: INDEPENDENT
ASSISTIVE_DEVICE: CRUTCHES
HEARING - RIGHT EAR: FUNCTIONAL
JUDGMENT_ADEQUATE_SAFELY_COMPLETE_DAILY_ACTIVITIES: YES
PATIENT'S MEMORY ADEQUATE TO SAFELY COMPLETE DAILY ACTIVITIES?: YES
HEARING - RIGHT EAR: FUNCTIONAL
FEEDING YOURSELF: INDEPENDENT
ADEQUATE_TO_COMPLETE_ADL: YES
WALKS IN HOME: INDEPENDENT

## 2024-05-20 ASSESSMENT — PAIN - FUNCTIONAL ASSESSMENT
PAIN_FUNCTIONAL_ASSESSMENT: 0-10

## 2024-05-20 ASSESSMENT — COGNITIVE AND FUNCTIONAL STATUS - GENERAL
PATIENT BASELINE BEDBOUND: NO
STANDING UP FROM CHAIR USING ARMS: A LITTLE
DAILY ACTIVITIY SCORE: 24
MOBILITY SCORE: 21
MOBILITY SCORE: 21
CLIMB 3 TO 5 STEPS WITH RAILING: A LITTLE
STANDING UP FROM CHAIR USING ARMS: A LITTLE
CLIMB 3 TO 5 STEPS WITH RAILING: A LITTLE
WALKING IN HOSPITAL ROOM: A LITTLE
WALKING IN HOSPITAL ROOM: A LITTLE
DAILY ACTIVITIY SCORE: 24

## 2024-05-20 ASSESSMENT — PATIENT HEALTH QUESTIONNAIRE - PHQ9
SUM OF ALL RESPONSES TO PHQ9 QUESTIONS 1 & 2: 0
2. FEELING DOWN, DEPRESSED OR HOPELESS: NOT AT ALL
1. LITTLE INTEREST OR PLEASURE IN DOING THINGS: NOT AT ALL

## 2024-05-20 ASSESSMENT — PAIN SCALES - GENERAL
PAINLEVEL_OUTOF10: 7
PAINLEVEL_OUTOF10: 6
PAINLEVEL_OUTOF10: 10 - WORST POSSIBLE PAIN
PAINLEVEL_OUTOF10: 4
PAINLEVEL_OUTOF10: 0 - NO PAIN
PAINLEVEL_OUTOF10: 4
PAINLEVEL_OUTOF10: 6
PAINLEVEL_OUTOF10: 4
PAINLEVEL_OUTOF10: 0 - NO PAIN
PAINLEVEL_OUTOF10: 0 - NO PAIN

## 2024-05-20 ASSESSMENT — PAIN DESCRIPTION - LOCATION
LOCATION: PELVIS
LOCATION: ABDOMEN
LOCATION: PELVIS

## 2024-05-20 ASSESSMENT — PAIN DESCRIPTION - ORIENTATION
ORIENTATION: LOWER

## 2024-05-20 ASSESSMENT — COLUMBIA-SUICIDE SEVERITY RATING SCALE - C-SSRS
6. HAVE YOU EVER DONE ANYTHING, STARTED TO DO ANYTHING, OR PREPARED TO DO ANYTHING TO END YOUR LIFE?: NO
2. HAVE YOU ACTUALLY HAD ANY THOUGHTS OF KILLING YOURSELF?: NO
1. IN THE PAST MONTH, HAVE YOU WISHED YOU WERE DEAD OR WISHED YOU COULD GO TO SLEEP AND NOT WAKE UP?: NO

## 2024-05-20 NOTE — NURSING NOTE
BSSR completed; assumed care of patient at this time. Resting comfortably in bed. No c/o pain or discomfort, No acute distress noted. Bed locked and low, call light and other commonly used items within reach. Safety maintained.

## 2024-05-20 NOTE — OP NOTE
Date: 2024  OR Location: [facility] OR    Name: Camille Romero, : 1970, Age: 53 y.o., MRN: [id number], Sex: female    Diagnosis  Pre-op Diagnosis     * PMB (postmenopausal bleeding) [N95.0]     * Fibroid [D21.9] Post-op Diagnosis     * PMB (postmenopausal bleeding) [N95.0]     * Fibroid [D21.9]     Procedures  Hysterectomy Laparoscopy with Salpingo-Oophorectomy, Cystoscopy Rigid  35812 - NE LAPAROSCOPY TOT HYSTERECTOMY >250 G W/TUBE/OVAR    Hysterectomy Laparoscopy with Salpingo-Oophorectomy, Cystoscopy Rigid  06229 - NE CYSTOURETHROSCOPY      Surgeons      * Jeffery Peters - Primary    Resident/Fellow/Other Assistant:  Surgeons and Role:  * No surgeons found with a matching role *    Procedure Summary  Anesthesia: General  ASA: III  Anesthesia Staff: Anesthesiologist: Dimitri Morrison DO; Kaiser Benavides MD  CRNA: HANSEL Petersen-CRNA  Estimated Blood Loss: 150 mL  Intra-op Medications:   Administrations occurring from 1245 to 1515 on 24:   Medication Name Total Dose   lidocaine-epinephrine (Xylocaine W/EPI) 1 %-1:100,000 injection 10 mL              Anesthesia Record               Intraprocedure I/O Totals          Output    Urine 150 mL    Est. Blood Loss 100 mL    NG/OG Tube Output 25 mL    Total Output 275 mL          Specimen:   ID Type Source Tests Collected by Time   1 : Uterus biopsy for Frozen Tissue UTERUS WITHOUT CERVIX SURGICAL PATHOLOGY EXAM Jeffery Peters MD 2024 1412   2 : Vaginal wall Biopsy Tissue VAGINA BIOPSY SURGICAL PATHOLOGY EXAM Jeffery Peters MD 2024 1422   3 : uterus, cervix, bilateral fallopian tubes bilateral ovaries Tissue UTERUS WITHOUT CERVIX, FALLOPIAN TUBES AND OVARIES BILATERAL SURGICAL PATHOLOGY EXAM Jeffery Peters MD 2024 1508        Staff:   Circulator: Abdoul Urbina RN  Scrub Person: Florian Cherry RN         Procedure Details:  The patient was seen in the preoperative area. The site of surgery was properly noted/marked if  necessary per policy. The patient has been actively warmed in preoperative area. Preoperative antibiotics have been ordered and given within 1 hours of incision. Venous thrombosis prophylaxis have been ordered including bilateral sequential compression devices    Findings:     Bulky Uterus   Fibroids  Mushy vascular fibroids   Adhesions between uterus, bowels , bladder   Normal ovaries   Normal upper abdomen   Both ureters seen in the retroperitoneal space.   Both ureteric jets visualized. Bladder intact.  Vaginal lesion anterior vagina biopsied  ( 3x3 cm )         Complications:  None; patient tolerated the procedure well.     Disposition: PACU - hemodynamically stable.  Condition: stable          PREOPERATIVE DIAGNOSES:  Postmenopausal bleeding  Pelvic pain  Fibroids      POSTOPERATIVE DIAGNOSES:  1. AUB.  2. Pathology pending.    PROCEDURE:  1- Total laparoscopic hysterectomy,   2- bilateral salpingioophorectomy   3- Biopsy of anterior vaginal wall ,  4-  Lysis of adhesions   5- Morcellation of uterus   6-, and cystoscopy.    EBL: 150 cc  Complication : none    FINDINGS:    Slightly bulky uterus.   Normal upper abdomen.  Normal tubes and ovaries.  Both ureters seen in the retroperitoneal space.   Both ureteric jets visualized. Bladder intact.        SURGEON:  Dr. Jeffery Peters    ANESTHESIA:  General anesthetic.           PROCEDURE NOTE:  The patient was taken to the operating room where a surgical timeout was taken.  General anesthesia was then achieved without difficulty. She was prepped and  draped in the usual sterile surgical fashion in the supine position with her  legs in the Yellofin stirrups. The bladder was emptied and a Velásquez catheter was  inserted.    A lesion in the vagina in the anterior wall was biopsied and sutured     A heavy-weighted speculum was placed in the patient's vaginal and the anterior  lip of the cervix was grasped with a single-tooth tenaculum.  Cervix was then carefully dilated to a  Hegar #8. The weighted speculum and tenaculum were then removed and the colpotomy probe was then introduced into the patient's Vagina.    Attention was then directed abdominally. A 5 mm skin incision was made in the  patient's umbilical region after infiltrating with local Marcaine. A Veress  needle was then carefully introduced into the patient's abdomen.   The initial pressure was 7 mmHg confirming intraperitoneal placement. We then inflated to 20 mmHg before inserting the 5  mm trochar. Laparoscopic camera was then inserted confirming intraperitoneal placement. We also checked at the site of entry and there was no injury.    A survey of the upper abdomen was performed and it  was found to be normal. The pressure was then taken down to 15 mmHg and the  patient was placed in the Trendelenburg position. Four  additional 5 mm skin  incisions were made after infiltrating it with local Marcaine. One in the left  lower quadrant, 1 in the right lower quadrant, and 1 in the left upper quadrant and one in the right upper quadrant   near the umbilicus.Four 5-mm trocars and sleeves were introduced under direct  visualization. THE LLQ port extended to 11 mm port   A survey of the pelvis was then performed noting the findings  listed above.     The course of the ureters was then visualized being on the pelvic side wall ans well away from the area of surgery.  Beginning on the left hand side, after visualization of ureter IP ligament was ligated and cut using ligasure    the fimbriated end of the left fallopian tube  was grasped and the mesosalpinx was cauterized and ligated using the LigaSure  device. This was done from the distal portion   towards the cornua of  the uterus. Once we reached the cornua of the uterus, the utero-ovarian  ligament was cauterized and transected using the LigaSure device. We then  opened up the broad and round ligaments, again using the LigaSure device. The  vesicouterine peritoneum was then opened  up and the bladder was carefully  dissected downwards using the Metzenbaum scissors. The uterine artery was  identified, some skeletonization was required. The uterine artery was then  cauterized and transected using the LigaSure device. The LigaSure device was  also used to perform a cardinal bite on the left hand side.    Attention was then turned to the right hand side where similar procedure was  performed. Beginning at the IP then  the right fallopian tube, the  mesosalpinx was cauterized and transected using the LigaSure device. Once we  reached the cornua, the utero-ovarian ligament was identified, cauterized and  transected using the LigaSure device. The broad and round ligaments were then  opened up, again using the LigaSure device and the bladder and vesicouterine  peritoneum was dissected downwards on the right hand side towards the previous  level of the bladder flap. This was done using primarily the Metzenbaum  scissors. Some addition dissection of the bladder was required at this time.  The uterine arteries were then skeletonization on the right hand side and  cauterized and transected using the LigaSure device. An additional cardinal  bite was taken using the LigaSure device. Again the cardinal bite was  cauterized and transected.  Some bipolar cautery was required to achieve  hemostasis in the area of the uterine artery on the right hand side. At that  time, we felt it appropriate to proceed to colpotomy.    Using the monopolar L-hook, a circumferential incision was made around the  cervix to open up the vagina at the level of the colpotomy probe. The uterus was too big to be delivered from the vaginia so morcellation was done to deliver the uterus. It was sent to frozen before morcellation       The uterus was then delivered vaginally. A small amount of bipolar was required along the  vaginal cuff in order to achieve hemostasis. We then turned our attention to  reapproximating the vaginal cuff. A 0  Vicryl was placed on the left hand angle  of the vaginal cuff and tied intracorporeally. The remaining area of the cuff  was closed with V-Lock suture beginning on the right hand side of the vault and  working towards the left hand side. At that time hemostasis was seemed to be  Adequate.    Attention was then turned to cystoscopy. The cystoscope was then introduced  into the patient's bladder and bilateral ureteric jets were seen. The dome of  bladder was inspected and a bubble was noted and there was no obvious defect or  evidence of sutures within the bladder itself. The cystoscope was then removed  from the patient's urinary bladder.    Attention was then again turned abdominally. All the pedicles were inspected in  a systematic fashion beginning with the utero-ovarian, round ligament as well as  vaginal cuff. Some additional bipolar cautery was required to achieve  hemostasis at the cuff. At that time hemostasis was deemed to be adequate.  All ports were then removed from the patient's abdomen under direct  visualization. The LLQ  fascia was then closed with 1 interrupted 0 Vicryl  as well as 1 figure of eight 0 Vicryl. The skin was then closed with a 4-0  Vicryl in an interrupted fashion.    The patient tolerated the procedure well. All counts were correct x2. She was  transferred to the recovery room in stable condition.    ESTIMATED BLOOD LOSS: 150cc

## 2024-05-20 NOTE — ANESTHESIA PROCEDURE NOTES
Airway  Date/Time: 5/20/2024 12:18 PM  Urgency: elective    Airway not difficult    Staffing  Performed: CRNA   Authorized by: Dimitri Morrison DO    Performed by: HANSEL Petersen-IBRAHIMA  Patient location during procedure: OR    Indications and Patient Condition  Indications for airway management: anesthesia and airway protection  Spontaneous Ventilation: absent  Sedation level: deep  Preoxygenated: yes  Patient position: sniffing  MILS maintained throughout  Mask difficulty assessment: 1 - vent by mask  Planned trial extubation    Final Airway Details  Final airway type: endotracheal airway      Successful airway: ETT  Cuffed: yes   Successful intubation technique: direct laryngoscopy  Facilitating devices/methods: intubating stylet  Endotracheal tube insertion site: oral  Blade: Robert  Blade size: #3  ETT size (mm): 7.0  Cormack-Lehane Classification: grade I - full view of glottis  Placement verified by: chest auscultation, capnometry and palpation of cuff   Cuff volume (mL): 10  Measured from: teeth  ETT to teeth (cm): 20  Number of attempts at approach: 1  Ventilation between attempts: none  Number of other approaches attempted: 0    Additional Comments  No change to oral cavity/ dentition.

## 2024-05-20 NOTE — ANESTHESIA PREPROCEDURE EVALUATION
Patient: Camille Romero    Procedure Information       Date/Time: 05/20/24 6785    Procedure: Hysterectomy Laparoscopy with Salpingo-Oophorectomy, Cystoscopy Rigid (Bilateral)    Location: [facility] / [facility]    Surgeons: Jeffery Peters MD            Relevant Problems   Anesthesia (within normal limits)      Cardiac   (+) Benign essential hypertension   (+) Coronary artery disease   (+) Hyperlipidemia   (+) Hypertension   (+) Non-cardiac chest pain      Neuro   (+) Anxiety   (+) Depression      GI   (+) GERD (gastroesophageal reflux disease)      Endocrine   (+) Type 2 diabetes mellitus (Multi)      Musculoskeletal   (+) Degeneration of lumbar intervertebral disc       Clinical information reviewed:   Tobacco  Allergies  Meds  Problems  Med Hx  Surg Hx   Fam Hx  Soc   Hx        NPO Detail:  No data recorded     Physical Exam    Airway  Mallampati: III     Cardiovascular    Dental    Pulmonary    Abdominal            Anesthesia Plan    History of general anesthesia?: yes  History of complications of general anesthesia?: no    ASA 3     general     intravenous induction   Anesthetic plan and risks discussed with patient.

## 2024-05-20 NOTE — CARE PLAN
Problem: Fall/Injury  Goal: Not fall by end of shift  Outcome: Progressing  Goal: Be free from injury by end of the shift  Outcome: Progressing  Goal: Verbalize understanding of personal risk factors for fall in the hospital  Outcome: Progressing  Goal: Verbalize understanding of risk factor reduction measures to prevent injury from fall in the home  Outcome: Progressing  Goal: Use assistive devices by end of the shift  Outcome: Progressing  Goal: Pace activities to prevent fatigue by end of the shift  Outcome: Progressing     Problem: Pain  Goal: Takes deep breaths with improved pain control throughout the shift  Outcome: Progressing  Goal: Turns in bed with improved pain control throughout the shift  Outcome: Progressing  Goal: Walks with improved pain control throughout the shift  Outcome: Progressing  Goal: Performs ADL's with improved pain control throughout shift  Outcome: Progressing  Goal: Participates in PT with improved pain control throughout the shift  Outcome: Progressing  Goal: Free from opioid side effects throughout the shift  Outcome: Progressing  Goal: Free from acute confusion related to pain meds throughout the shift  Outcome: Progressing     Problem: Deep Vein Thrombosis  Goal: I will remain free from complications of deep vein thrombosis and maintain current level of mobility  Outcome: Progressing   The patient's goals for the shift include      The clinical goals for the shift include monitor vs, control pain    Over the shift, the patient did not make progress toward the following goals. Barriers to progression include pain. Recommendations to address these barriers include pain medication, assess.

## 2024-05-20 NOTE — ANESTHESIA POSTPROCEDURE EVALUATION
Patient: Camille Romero    Procedure Summary       Date: 05/20/24 Room / Location: [facility]    Anesthesia Start: 1212 Anesthesia Stop: 1543    Procedure: Hysterectomy Laparoscopy with Salpingo-Oophorectomy, Cystoscopy Rigid (Bilateral) Diagnosis:       PMB (postmenopausal bleeding)      Fibroid      (PMB (postmenopausal bleeding) [N95.0])      (Fibroid [D21.9])    Surgeons: Jeffery Peters MD Responsible Provider: FAINA Petersen    Anesthesia Type: general ASA Status: 3            Anesthesia Type: general    Vitals Value Taken Time   /97 05/20/24 1541   Temp 36 °C (96.8 °F) 05/20/24 1541   Pulse 68 05/20/24 1548   Resp 23 05/20/24 1548   SpO2 93 % 05/20/24 1548   Vitals shown include unfiled device data.    Anesthesia Post Evaluation    Patient location during evaluation: PACU  Patient participation: complete - patient participated  Level of consciousness: awake  Pain management: adequate  Multimodal analgesia pain management approach  Airway patency: patent  Cardiovascular status: acceptable and hemodynamically stable  Respiratory status: acceptable and spontaneous ventilation  Hydration status: euvolemic  Postoperative Nausea and Vomiting: none  Comments: No nausea or vomiting        There were no known notable events for this encounter.

## 2024-05-21 ENCOUNTER — PHARMACY VISIT (OUTPATIENT)
Dept: PHARMACY | Facility: CLINIC | Age: 54
End: 2024-05-21
Payer: MEDICAID

## 2024-05-21 VITALS
OXYGEN SATURATION: 97 % | HEART RATE: 75 BPM | BODY MASS INDEX: 40.82 KG/M2 | SYSTOLIC BLOOD PRESSURE: 147 MMHG | RESPIRATION RATE: 16 BRPM | DIASTOLIC BLOOD PRESSURE: 74 MMHG | TEMPERATURE: 97.9 F | WEIGHT: 275.57 LBS | HEIGHT: 69 IN

## 2024-05-21 LAB
ERYTHROCYTE [DISTWIDTH] IN BLOOD BY AUTOMATED COUNT: 13.2 % (ref 11.5–14.5)
GLUCOSE BLD MANUAL STRIP-MCNC: 198 MG/DL (ref 74–99)
GLUCOSE BLD MANUAL STRIP-MCNC: 242 MG/DL (ref 74–99)
HCT VFR BLD AUTO: 47.9 % (ref 36–46)
HGB BLD-MCNC: 15.8 G/DL (ref 12–16)
MCH RBC QN AUTO: 30.4 PG (ref 26–34)
MCHC RBC AUTO-ENTMCNC: 33 G/DL (ref 32–36)
MCV RBC AUTO: 92 FL (ref 80–100)
NRBC BLD-RTO: 0 /100 WBCS (ref 0–0)
PLATELET # BLD AUTO: 204 X10*3/UL (ref 150–450)
RBC # BLD AUTO: 5.2 X10*6/UL (ref 4–5.2)
WBC # BLD AUTO: 13.9 X10*3/UL (ref 4.4–11.3)

## 2024-05-21 PROCEDURE — 94640 AIRWAY INHALATION TREATMENT: CPT

## 2024-05-21 PROCEDURE — 9420000001 HC RT PATIENT EDUCATION 5 MIN

## 2024-05-21 PROCEDURE — 2500000006 HC RX 250 W HCPCS SELF ADMINISTERED DRUGS (ALT 637 FOR ALL PAYERS): Performed by: OBSTETRICS & GYNECOLOGY

## 2024-05-21 PROCEDURE — 36415 COLL VENOUS BLD VENIPUNCTURE: CPT | Performed by: OBSTETRICS & GYNECOLOGY

## 2024-05-21 PROCEDURE — 2500000001 HC RX 250 WO HCPCS SELF ADMINISTERED DRUGS (ALT 637 FOR MEDICARE OP): Performed by: OBSTETRICS & GYNECOLOGY

## 2024-05-21 PROCEDURE — RXMED WILLOW AMBULATORY MEDICATION CHARGE

## 2024-05-21 PROCEDURE — 96372 THER/PROPH/DIAG INJ SC/IM: CPT | Performed by: OBSTETRICS & GYNECOLOGY

## 2024-05-21 PROCEDURE — 2500000004 HC RX 250 GENERAL PHARMACY W/ HCPCS (ALT 636 FOR OP/ED): Performed by: OBSTETRICS & GYNECOLOGY

## 2024-05-21 PROCEDURE — 2500000002 HC RX 250 W HCPCS SELF ADMINISTERED DRUGS (ALT 637 FOR MEDICARE OP, ALT 636 FOR OP/ED): Performed by: OBSTETRICS & GYNECOLOGY

## 2024-05-21 PROCEDURE — 82947 ASSAY GLUCOSE BLOOD QUANT: CPT | Mod: 59

## 2024-05-21 PROCEDURE — 85027 COMPLETE CBC AUTOMATED: CPT | Performed by: OBSTETRICS & GYNECOLOGY

## 2024-05-21 PROCEDURE — 7100000011 HC EXTENDED STAY RECOVERY HOURLY - NURSING UNIT

## 2024-05-21 RX ORDER — IBUPROFEN 800 MG/1
800 TABLET ORAL EVERY 6 HOURS PRN
Qty: 30 TABLET | Refills: 1 | Status: SHIPPED | OUTPATIENT
Start: 2024-05-21

## 2024-05-21 RX ORDER — OXYCODONE AND ACETAMINOPHEN 5; 325 MG/1; MG/1
1 TABLET ORAL EVERY 6 HOURS PRN
Qty: 20 TABLET | Refills: 0 | Status: SHIPPED | OUTPATIENT
Start: 2024-05-21 | End: 2024-05-28

## 2024-05-21 RX ORDER — DOCUSATE SODIUM 100 MG/1
100 CAPSULE, LIQUID FILLED ORAL 2 TIMES DAILY PRN
Qty: 60 CAPSULE | Refills: 1 | Status: SHIPPED | OUTPATIENT
Start: 2024-05-21

## 2024-05-21 RX ORDER — ALBUTEROL SULFATE 0.83 MG/ML
2.5 SOLUTION RESPIRATORY (INHALATION) 3 TIMES DAILY
Status: DISCONTINUED | OUTPATIENT
Start: 2024-05-21 | End: 2024-05-21 | Stop reason: HOSPADM

## 2024-05-21 RX ADMIN — ALBUTEROL SULFATE 2.5 MG: 2.5 SOLUTION RESPIRATORY (INHALATION) at 07:57

## 2024-05-21 RX ADMIN — HYDROCHLOROTHIAZIDE 12.5 MG: 12.5 CAPSULE ORAL at 09:10

## 2024-05-21 RX ADMIN — EZETIMIBE 10 MG: 10 TABLET ORAL at 09:10

## 2024-05-21 RX ADMIN — PRAVASTATIN SODIUM 80 MG: 40 TABLET ORAL at 09:10

## 2024-05-21 RX ADMIN — ALBUTEROL SULFATE 2.5 MG: 2.5 SOLUTION RESPIRATORY (INHALATION) at 13:14

## 2024-05-21 RX ADMIN — TRAMADOL HYDROCHLORIDE 100 MG: 50 TABLET ORAL at 00:38

## 2024-05-21 RX ADMIN — VALSARTAN 40 MG: 80 TABLET, FILM COATED ORAL at 09:10

## 2024-05-21 RX ADMIN — BUTALBITAL, ACETAMINOPHEN AND CAFFEINE 1 TABLET: 325; 50; 40 TABLET ORAL at 05:00

## 2024-05-21 RX ADMIN — TRAMADOL HYDROCHLORIDE 100 MG: 50 TABLET ORAL at 06:37

## 2024-05-21 RX ADMIN — ENOXAPARIN SODIUM 40 MG: 40 INJECTION SUBCUTANEOUS at 09:11

## 2024-05-21 RX ADMIN — POLYETHYLENE GLYCOL 3350 17 G: 17 POWDER, FOR SOLUTION ORAL at 09:10

## 2024-05-21 SDOH — SOCIAL STABILITY: SOCIAL NETWORK: HOW OFTEN DO YOU GET TOGETHER WITH FRIENDS OR RELATIVES?: MORE THAN THREE TIMES A WEEK

## 2024-05-21 SDOH — SOCIAL STABILITY: SOCIAL INSECURITY: WITHIN THE LAST YEAR, HAVE YOU BEEN HUMILIATED OR EMOTIONALLY ABUSED IN OTHER WAYS BY YOUR PARTNER OR EX-PARTNER?: NO

## 2024-05-21 SDOH — HEALTH STABILITY: PHYSICAL HEALTH: ON AVERAGE, HOW MANY MINUTES DO YOU ENGAGE IN EXERCISE AT THIS LEVEL?: 0 MIN

## 2024-05-21 SDOH — SOCIAL STABILITY: SOCIAL INSECURITY: WITHIN THE LAST YEAR, HAVE YOU BEEN AFRAID OF YOUR PARTNER OR EX-PARTNER?: NO

## 2024-05-21 SDOH — ECONOMIC STABILITY: INCOME INSECURITY: IN THE PAST 12 MONTHS, HAS THE ELECTRIC, GAS, OIL, OR WATER COMPANY THREATENED TO SHUT OFF SERVICE IN YOUR HOME?: NO

## 2024-05-21 SDOH — ECONOMIC STABILITY: FOOD INSECURITY: WITHIN THE PAST 12 MONTHS, YOU WORRIED THAT YOUR FOOD WOULD RUN OUT BEFORE YOU GOT MONEY TO BUY MORE.: NEVER TRUE

## 2024-05-21 SDOH — HEALTH STABILITY: MENTAL HEALTH
STRESS IS WHEN SOMEONE FEELS TENSE, NERVOUS, ANXIOUS, OR CAN'T SLEEP AT NIGHT BECAUSE THEIR MIND IS TROUBLED. HOW STRESSED ARE YOU?: NOT AT ALL

## 2024-05-21 SDOH — SOCIAL STABILITY: SOCIAL NETWORK: HOW OFTEN DO YOU ATTENT MEETINGS OF THE CLUB OR ORGANIZATION YOU BELONG TO?: PATIENT DECLINED

## 2024-05-21 SDOH — ECONOMIC STABILITY: FOOD INSECURITY: WITHIN THE PAST 12 MONTHS, THE FOOD YOU BOUGHT JUST DIDN'T LAST AND YOU DIDN'T HAVE MONEY TO GET MORE.: NEVER TRUE

## 2024-05-21 SDOH — SOCIAL STABILITY: SOCIAL INSECURITY
WITHIN THE LAST YEAR, HAVE TO BEEN RAPED OR FORCED TO HAVE ANY KIND OF SEXUAL ACTIVITY BY YOUR PARTNER OR EX-PARTNER?: NO

## 2024-05-21 SDOH — SOCIAL STABILITY: SOCIAL INSECURITY
WITHIN THE LAST YEAR, HAVE YOU BEEN KICKED, HIT, SLAPPED, OR OTHERWISE PHYSICALLY HURT BY YOUR PARTNER OR EX-PARTNER?: NO

## 2024-05-21 SDOH — HEALTH STABILITY: MENTAL HEALTH
HOW OFTEN DO YOU NEED TO HAVE SOMEONE HELP YOU WHEN YOU READ INSTRUCTIONS, PAMPHLETS, OR OTHER WRITTEN MATERIAL FROM YOUR DOCTOR OR PHARMACY?: NEVER

## 2024-05-21 SDOH — SOCIAL STABILITY: SOCIAL NETWORK: ARE YOU MARRIED, WIDOWED, DIVORCED, SEPARATED, NEVER MARRIED, OR LIVING WITH A PARTNER?: PATIENT DECLINED

## 2024-05-21 SDOH — SOCIAL STABILITY: SOCIAL NETWORK
DO YOU BELONG TO ANY CLUBS OR ORGANIZATIONS SUCH AS CHURCH GROUPS UNIONS, FRATERNAL OR ATHLETIC GROUPS, OR SCHOOL GROUPS?: PATIENT DECLINED

## 2024-05-21 SDOH — SOCIAL STABILITY: SOCIAL NETWORK: HOW OFTEN DO YOU ATTEND CHURCH OR RELIGIOUS SERVICES?: PATIENT DECLINED

## 2024-05-21 SDOH — SOCIAL STABILITY: SOCIAL NETWORK
IN A TYPICAL WEEK, HOW MANY TIMES DO YOU TALK ON THE PHONE WITH FAMILY, FRIENDS, OR NEIGHBORS?: MORE THAN THREE TIMES A WEEK

## 2024-05-21 SDOH — HEALTH STABILITY: PHYSICAL HEALTH: ON AVERAGE, HOW MANY DAYS PER WEEK DO YOU ENGAGE IN MODERATE TO STRENUOUS EXERCISE (LIKE A BRISK WALK)?: 0 DAYS

## 2024-05-21 ASSESSMENT — PAIN - FUNCTIONAL ASSESSMENT
PAIN_FUNCTIONAL_ASSESSMENT: 0-10

## 2024-05-21 ASSESSMENT — PAIN SCALES - GENERAL
PAINLEVEL_OUTOF10: 2
PAINLEVEL_OUTOF10: 5 - MODERATE PAIN
PAINLEVEL_OUTOF10: 7

## 2024-05-21 NOTE — CARE PLAN
Problem: Pain  Goal: Takes deep breaths with improved pain control throughout the shift  Outcome: Progressing  Goal: Turns in bed with improved pain control throughout the shift  Outcome: Progressing  Goal: Walks with improved pain control throughout the shift  Outcome: Progressing  Goal: Performs ADL's with improved pain control throughout shift  Outcome: Progressing  Goal: Participates in PT with improved pain control throughout the shift  Outcome: Progressing  Goal: Free from opioid side effects throughout the shift  Outcome: Progressing  Goal: Free from acute confusion related to pain meds throughout the shift  Outcome: Progressing     Problem: Deep Vein Thrombosis  Goal: I will remain free from complications of deep vein thrombosis and maintain current level of mobility  Outcome: Progressing     Problem: Pain - Adult  Goal: Verbalizes/displays adequate comfort level or baseline comfort level  Outcome: Progressing     Problem: Safety - Adult  Goal: Free from fall injury  Outcome: Progressing     Problem: Discharge Planning  Goal: Discharge to home or other facility with appropriate resources  Outcome: Progressing   The patient's goals for the shift include      The clinical goals for the shift include Maintain pt safety/comfort; pain management; post-op care

## 2024-05-21 NOTE — PROGRESS NOTES
"  NAME: Camille Romero  MRN: [id number]  SERVICE DATE: May 21, 2024           SUBJECTIVE:  Patient has lower abdominal pain , increases with movement , crampy . Tolerating PO intake. Velásquez in . Passing flatus.  . No bleeding     No chest pain, No SOB, No Leg pain     OBJECTIVE:     PHYSICAL EXAM:      GENERAL: pleasant, female in no apparent distress      HEENT: Normocephalic, atraumatic, mucus membranes moist and no lesions      CHEST: Normal inspiratory effort     Abdomen:  Soft ,  Non-distended   Clean Dry Incision   Extremities:   No calf tenderness and No edema       VITAL SIGNS:  /77 (BP Location: Right arm, Patient Position: Lying)   Pulse 78   Temp 37.3 °C (99.1 °F) (Temporal)   Resp 18   Ht 1.74 m (5' 8.5\")   Wt 125 kg (275 lb 9.2 oz)   SpO2 96%   BMI 41.29 kg/m²         ASSESSMENT:  53 y.o.  female who is  Postoperative Day # 1 TLH + BSO          PLAN:  Routine postpartum care. Routine postop care. Encourage ambulation and IS usage. Advance diet. Encourage patient to use pain meds.   Discharge Velásquez,   SLIV      Discharge Home after voiding      Discharge instructions given to patient regarding pelvic rest, bathing, stairs, walking,    lifting, driving, and follow-up.  Patient expresses understanding.   "

## 2024-05-21 NOTE — NURSING NOTE
Assumed care of patient. Patient awake in bed. Ambulated to bathroom. Velásquez removed by PCA. Call light and possessions within reach.

## 2024-05-21 NOTE — DISCHARGE SUMMARY
Discharge Summary    Admission Date: 5/20/2024  Discharge Date: 5/21/2024    Discharge Diagnosis  PMB (postmenopausal bleeding)    Hospital Course    Patient had a laparoscopic hysterectomy and bilateral salpingioophorectomy     Delivery Date: This patient has no babies on file. This patient has no babies on file.  Delivery type: This patient has no babies on file.   GA at delivery: Unknown  Outcome: This patient has no babies on file.  Anesthesia during delivery: This patient has no babies on file.  Intrapartum complications: This patient has no babies on file.  Feeding method:          Discharge Meds     Your medication list        START taking these medications        Instructions Last Dose Given Next Dose Due   docusate sodium 100 mg capsule  Commonly known as: Colace      Take 1 capsule (100 mg) by mouth 2 times a day as needed for constipation.       ibuprofen 800 mg tablet      Take 1 tablet (800 mg) by mouth every 6 hours if needed for mild pain (1 - 3).       oxyCODONE-acetaminophen 5-325 mg tablet  Commonly known as: Percocet      Take 1 tablet by mouth every 6 hours if needed for severe pain (7 - 10) for up to 7 days.              CHANGE how you take these medications        Instructions Last Dose Given Next Dose Due   carvedilol 12.5 mg tablet  Commonly known as: Coreg  What changed:   when to take this  additional instructions      Take 1 tablet (12.5 mg) by mouth 2 times a day. With food              CONTINUE taking these medications        Instructions Last Dose Given Next Dose Due   amitriptyline 25 mg tablet  Commonly known as: Elavil      Take 2 tablets (50 mg) by mouth once daily at bedtime.       blood-glucose meter misc      Twice daily       butalbital-acetaminophen-caff -40 mg tablet           ergocalciferol 1.25 MG (95532 UT) capsule  Commonly known as: Vitamin D-2           escitalopram 20 mg tablet  Commonly known as: Lexapro           ezetimibe 10 mg tablet  Commonly known as:  Zetia      Take 1 tablet (10 mg) by mouth once daily.       FreeStyle Lancets 28 gauge  Generic drug: FreeStyle lancets           OneTouch Delica Plus Lancet 33 gauge misc  Generic drug: lancets      Inject 1 each under the skin 2 times a day.       Gralise 600 mg tablet extended release 24 hr  Generic drug: gabapentin           hydroCHLOROthiazide 12.5 mg capsule  Commonly known as: Microzide      Take 1 capsule (12.5 mg) by mouth once daily in the morning.       OneTouch Ultra Test strip  Generic drug: blood sugar diagnostic      Inject 1 each under the skin 2 times a day.       pravastatin 80 mg tablet  Commonly known as: Pravachol      TAKE ONE TABLET BY MOUTH ONCE A DAY       Proventil HFA 90 mcg/actuation inhaler  Generic drug: albuterol           semaglutide 0.25 mg or 0.5 mg (2 mg/3 mL) pen injector      Inject 0.25 mg under the skin 1 (one) time per week. From week 5 start taking 0.5 mg every week       topiramate 100 mg tablet  Commonly known as: Topamax           valsartan 40 mg tablet  Commonly known as: Diovan      Take 1 tablet (40 mg) by mouth once daily.       verapamil  mg ER tablet  Commonly known as: Calan-SR           VITAMIN B-12 ORAL                  STOP taking these medications      norethindrone 5 mg tablet  Commonly known as: Aygestin        potassium chloride CR 10 mEq ER tablet  Commonly known as: Klor-Con        Trulicity 3 mg/0.5 mL pen injector  Generic drug: dulaglutide                  Where to Get Your Medications        These medications were sent to [facility]  [address], [address], [facility] [place] [address]      Hours: 9 AM to 6 PM Mon-Fri, 9 AM to 1 PM Sat Phone: [phone]   docusate sodium 100 mg capsule  ibuprofen 800 mg tablet  oxyCODONE-acetaminophen 5-325 mg tablet          Complications Requiring Follow-Up  none    Test Results Pending At Discharge  Pending Labs       Order Current Status    Surgical Pathology Exam In process            Outpatient  Follow-Up  Future Appointments   Date Time Provider Department Center   6/4/2024 10:00 AM Jeffery Peters MD [id number] [facility]   6/20/2024  8:30 AM [facility] HC3  [id number]   6/20/2024  9:00 AM HANSEL Thacker-CNP [id number] [facility]   7/12/2024  9:30 AM Mira Ruiz MD [id number] [facility]          Jeffery Peters MD

## 2024-05-21 NOTE — PROGRESS NOTES
05/21/24 1408   Discharge Planning   Living Arrangements Spouse/significant other;Children   Support Systems Spouse/significant other   Assistance Needed independent   Type of Residence Private residence   Number of Stairs to Enter Residence 4   Number of Stairs Within Residence 14   Do you have animals or pets at home? Yes   Type of Animals or Pets 1 dog   Who is requesting discharge planning? Provider   Home or Post Acute Services None   Patient expects to be discharged to: Home no needs   Does the patient need discharge transport arranged? No

## 2024-05-23 LAB
LABORATORY COMMENT REPORT: NORMAL
Lab: NORMAL
PATH REPORT.FINAL DX SPEC: NORMAL
PATH REPORT.GROSS SPEC: NORMAL
PATH REPORT.RELEVANT HX SPEC: NORMAL
PATH REPORT.TOTAL CANCER: NORMAL

## 2024-06-05 ENCOUNTER — OFFICE VISIT (OUTPATIENT)
Dept: OBSTETRICS AND GYNECOLOGY | Facility: CLINIC | Age: 54
End: 2024-06-05
Payer: COMMERCIAL

## 2024-06-05 VITALS
WEIGHT: 270 LBS | DIASTOLIC BLOOD PRESSURE: 72 MMHG | BODY MASS INDEX: 39.99 KG/M2 | HEIGHT: 69 IN | SYSTOLIC BLOOD PRESSURE: 122 MMHG

## 2024-06-05 DIAGNOSIS — N93.9 ABNORMAL UTERINE BLEEDING (AUB): Primary | ICD-10-CM

## 2024-06-05 PROCEDURE — 99024 POSTOP FOLLOW-UP VISIT: CPT | Performed by: OBSTETRICS & GYNECOLOGY

## 2024-06-05 PROCEDURE — 3078F DIAST BP <80 MM HG: CPT | Performed by: OBSTETRICS & GYNECOLOGY

## 2024-06-05 PROCEDURE — 3074F SYST BP LT 130 MM HG: CPT | Performed by: OBSTETRICS & GYNECOLOGY

## 2024-06-05 PROCEDURE — 4010F ACE/ARB THERAPY RXD/TAKEN: CPT | Performed by: OBSTETRICS & GYNECOLOGY

## 2024-06-05 ASSESSMENT — ENCOUNTER SYMPTOMS
LOSS OF SENSATION IN FEET: 0
DEPRESSION: 0
OCCASIONAL FEELINGS OF UNSTEADINESS: 0

## 2024-06-05 ASSESSMENT — PAIN SCALES - GENERAL: PAINLEVEL: 0-NO PAIN

## 2024-06-05 NOTE — PROGRESS NOTES
Camille Romero is a 53 y.o. female,   who presented for her postoperative visit     She had TLH and morcellation       She has been doing   well postoperatively. She denies any fever or other constitutional symptoms. She denies any symptoms of urinary tract infection. She also denies any issues with her bowels. She still has pelvic pain but getting better . She is also having pink discharge              Obstetrical History:  OB History          3    Para   3    Term   2       1    AB        Living   3         SAB        IAB        Ectopic        Multiple        Live Births                         Past Medical History:   Diagnosis Date    Anxiety     Asthma (HHS-HCC)     Coronary artery disease     Depression     GERD (gastroesophageal reflux disease)     Hyperlipidemia     Hypertension     Myocardial infarction (Multi)     Obesity     Sleep apnea     Type 2 diabetes mellitus (Multi)      Past Surgical History:   Procedure Laterality Date    ADENOIDECTOMY      COLONOSCOPY      CORONARY ARTERY BYPASS GRAFT  2009    X 1 graft    TONSILLECTOMY       Family History   Problem Relation Name Age of Onset    Hypertension Mother      Stroke Mother          CVA    Diabetes Father      Hypertension Father      Heart disease Father      Cancer Father      Heart disease Father's Brother      Other (stents) Father's Brother      Other (defribrillator) Father's Brother       Social History     Tobacco Use    Smoking status: Every Day     Current packs/day: 0.50     Average packs/day: 0.5 packs/day for 38.4 years (19.2 ttl pk-yrs)     Types: Cigarettes     Start date:     Smokeless tobacco: Never   Vaping Use    Vaping status: Never Used   Substance Use Topics    Alcohol use: Not Currently     Alcohol/week: 5.0 standard drinks of alcohol     Types: 5 Standard drinks or equivalent per week    Drug use: Never     Social History     Tobacco Use   Smoking Status Every Day    Current packs/day: 0.50     Average packs/day: 0.5 packs/day for 38.4 years (19.2 ttl pk-yrs)    Types: Cigarettes    Start date:    Smokeless Tobacco Never       Current Outpatient Medications on File Prior to Visit   Medication Sig Dispense Refill    albuterol (Proventil HFA) 90 mcg/actuation inhaler Inhale 2 puffs every 4 hours if needed.      amitriptyline (Elavil) 25 mg tablet Take 2 tablets (50 mg) by mouth once daily at bedtime. 180 tablet 1    blood-glucose meter misc Twice daily 1 each 0    butalbital-acetaminophen-caff -40 mg tablet TAKE 1 TO 2 TABLET BY MOUTH EVERY 4 HOURS AS NEEDED F      carvedilol (Coreg) 12.5 mg tablet Take 1 tablet (12.5 mg) by mouth 2 times a day. With food 180 tablet 1    cyanocobalamin, vitamin B-12, (VITAMIN B-12 ORAL) 1000 MCG  as directed Orally      docusate sodium (Colace) 100 mg capsule Take 1 capsule (100 mg) by mouth 2 times a day as needed for constipation. 60 capsule 1    ergocalciferol (Vitamin D-2) 1.25 MG (08316 UT) capsule Take 1 capsule (1,250 mcg) by mouth 1 (one) time per week. THURSDAY      escitalopram (Lexapro) 20 mg tablet Take 1 tablet (20 mg) by mouth once daily at bedtime.      ezetimibe (Zetia) 10 mg tablet Take 1 tablet (10 mg) by mouth once daily. 90 tablet 2    FreeStyle Lancets 28 gauge USE AS DIRECTED EVERY 12 HOURS      Gralise 600 mg tablet extended release 24 hr Take 2 tablets by mouth once daily at bedtime.      hydroCHLOROthiazide (Microzide) 12.5 mg capsule Take 1 capsule (12.5 mg) by mouth once daily in the morning. 90 capsule 1    ibuprofen 800 mg tablet Take 1 tablet (800 mg) by mouth every 6 hours if needed for mild pain (1 - 3). 30 tablet 1    OneTouch Delica Plus Lancet 33 gauge misc Inject 1 each under the skin 2 times a day. 200 each 1    [] OneTouch Ultra Test strip Inject 1 each under the skin 2 times a day. 200 each 1    pravastatin (Pravachol) 80 mg tablet TAKE ONE TABLET BY MOUTH ONCE A DAY 90 tablet 0    semaglutide 0.25 mg or 0.5 mg (2 mg/3  "mL) pen injector Inject 0.25 mg under the skin 1 (one) time per week. From week 5 start taking 0.5 mg every week 3 mL 2    topiramate (Topamax) 100 mg tablet Take 2 tablets (200 mg) by mouth once daily at bedtime.      valsartan (Diovan) 40 mg tablet Take 1 tablet (40 mg) by mouth once daily. 90 tablet 1    verapamil SR (Calan-SR) 120 mg ER tablet Take 1 tablet (120 mg) by mouth once daily at bedtime.       No current facility-administered medications on file prior to visit.     Allergies   Allergen Reactions    Latex Anaphylaxis, Shortness of breath and Hives    Aspirin Hives    Bupropion Hcl Other and Nausea Only    Pertussis Vaccines Hives    Diphtheria, Pertussis, Tetanus Vaccine Hives    Nickel Rash, Swelling and Hives    Penicillins Hives and Swelling         REVIEW OF SYSTEMS:    General: No weight loss, malaise or fevers or other constitutional symptoms.  Neuro: No history of TIA's, stroke,.  No neurological symptoms or problems. No visual changes  Respiratory: No history of respiratory/pulmonary symptoms or problems.  Cardiovascular: No history of cardiovascular symptoms or problems.  GI: No history of GI symptoms or problems.   : No history of UTI in past 6 weeks.  No history of  symptoms or problems.  BREASTS: No skin dimpling, nipple discharge or masses.  Endocrine: No history of diabetes. No Thyroid symptoms  Hematology: No history of bleeding or clotting disorder.  Skin: Negative for lesions, rash, and itching.      PHYSICAL EXAMINATION:    /72   Ht 1.753 m (5' 9\")   Wt 122 kg (270 lb)   BMI 39.87 kg/m²   GENERAL: pleasant, female in no apparent distress  HEENT: Normocephalic, atraumatic, mucus membranes moist and no lesions  NEURO: alert and oriented x3,exam grossly non-focal  NECK: Supple, full range of motion, no adenopathy and thyroid normal  DERMATOLOGY: Normal, without lesions, non-icteric and non-hirsute  CHEST: Normal inspiratory effort    ABDOMEN: soft, non-tender and no " masses  Clean Dry incision , steri strips removed  PELVIC:   no adnexal masses and non-tender        ASSESSMENT and Plan:    Camille Romero is a 53 y.o. female,  who presented for her post op visit. She is doing well    I did discuss the pathology which was normal    FU for annual exam       Jeffery Peters MD        This note was produced with voice recognition software and may contain errors in grammar, spelling and content.  If any questions, please feel free to contact me.     I was accompanied by Female Chaperone, LPN  during the exam

## 2024-06-13 DIAGNOSIS — D75.1 ERYTHROCYTOSIS: Primary | ICD-10-CM

## 2024-06-20 ENCOUNTER — APPOINTMENT (OUTPATIENT)
Dept: HEMATOLOGY/ONCOLOGY | Facility: CLINIC | Age: 54
End: 2024-06-20
Payer: COMMERCIAL

## 2024-06-25 ENCOUNTER — LAB (OUTPATIENT)
Dept: LAB | Facility: CLINIC | Age: 54
End: 2024-06-25
Payer: COMMERCIAL

## 2024-06-25 DIAGNOSIS — D75.1 ERYTHROCYTOSIS: ICD-10-CM

## 2024-06-25 LAB
BASOPHILS # BLD AUTO: 0.07 X10*3/UL (ref 0–0.1)
BASOPHILS NFR BLD AUTO: 0.8 %
EOSINOPHIL # BLD AUTO: 0.48 X10*3/UL (ref 0–0.7)
EOSINOPHIL NFR BLD AUTO: 5.2 %
ERYTHROCYTE [DISTWIDTH] IN BLOOD BY AUTOMATED COUNT: 13.1 % (ref 11.5–14.5)
HCT VFR BLD AUTO: 49.3 % (ref 36–46)
HGB BLD-MCNC: 16.1 G/DL (ref 12–16)
IMM GRANULOCYTES # BLD AUTO: 0.02 X10*3/UL (ref 0–0.7)
IMM GRANULOCYTES NFR BLD AUTO: 0.2 % (ref 0–0.9)
LYMPHOCYTES # BLD AUTO: 3.32 X10*3/UL (ref 1.2–4.8)
LYMPHOCYTES NFR BLD AUTO: 36.1 %
MCH RBC QN AUTO: 30.1 PG (ref 26–34)
MCHC RBC AUTO-ENTMCNC: 32.7 G/DL (ref 32–36)
MCV RBC AUTO: 92 FL (ref 80–100)
MONOCYTES # BLD AUTO: 0.55 X10*3/UL (ref 0.1–1)
MONOCYTES NFR BLD AUTO: 6 %
NEUTROPHILS # BLD AUTO: 4.75 X10*3/UL (ref 1.2–7.7)
NEUTROPHILS NFR BLD AUTO: 51.7 %
NRBC BLD-RTO: 0 /100 WBCS (ref 0–0)
PLATELET # BLD AUTO: 212 X10*3/UL (ref 150–450)
RBC # BLD AUTO: 5.35 X10*6/UL (ref 4–5.2)
WBC # BLD AUTO: 9.2 X10*3/UL (ref 4.4–11.3)

## 2024-06-25 PROCEDURE — 85025 COMPLETE CBC W/AUTO DIFF WBC: CPT

## 2024-06-25 PROCEDURE — 36415 COLL VENOUS BLD VENIPUNCTURE: CPT

## 2024-06-27 ENCOUNTER — OFFICE VISIT (OUTPATIENT)
Dept: HEMATOLOGY/ONCOLOGY | Facility: CLINIC | Age: 54
End: 2024-06-27
Payer: COMMERCIAL

## 2024-06-27 ENCOUNTER — LAB (OUTPATIENT)
Dept: LAB | Facility: CLINIC | Age: 54
End: 2024-06-27
Payer: COMMERCIAL

## 2024-06-27 VITALS
TEMPERATURE: 97.1 F | HEART RATE: 69 BPM | BODY MASS INDEX: 43.95 KG/M2 | SYSTOLIC BLOOD PRESSURE: 134 MMHG | HEIGHT: 66 IN | WEIGHT: 273.48 LBS | OXYGEN SATURATION: 96 % | DIASTOLIC BLOOD PRESSURE: 73 MMHG | RESPIRATION RATE: 18 BRPM

## 2024-06-27 DIAGNOSIS — E53.8 VITAMIN B12 DEFICIENCY: ICD-10-CM

## 2024-06-27 DIAGNOSIS — E55.9 VITAMIN D DEFICIENCY: ICD-10-CM

## 2024-06-27 DIAGNOSIS — D75.1 ERYTHROCYTOSIS: Primary | ICD-10-CM

## 2024-06-27 DIAGNOSIS — D75.1 ERYTHROCYTOSIS: ICD-10-CM

## 2024-06-27 LAB
25(OH)D3 SERPL-MCNC: 35 NG/ML (ref 30–100)
FERRITIN SERPL-MCNC: 84 NG/ML (ref 8–150)
IRON SATN MFR SERPL: 23 % (ref 25–45)
IRON SERPL-MCNC: 82 UG/DL (ref 35–150)
TIBC SERPL-MCNC: 355 UG/DL (ref 240–445)
UIBC SERPL-MCNC: 273 UG/DL (ref 110–370)
VIT B12 SERPL-MCNC: 1069 PG/ML (ref 211–911)

## 2024-06-27 PROCEDURE — 36415 COLL VENOUS BLD VENIPUNCTURE: CPT

## 2024-06-27 PROCEDURE — 4010F ACE/ARB THERAPY RXD/TAKEN: CPT | Performed by: NURSE PRACTITIONER

## 2024-06-27 PROCEDURE — 82728 ASSAY OF FERRITIN: CPT | Mod: WESLAB

## 2024-06-27 PROCEDURE — 3078F DIAST BP <80 MM HG: CPT | Performed by: NURSE PRACTITIONER

## 2024-06-27 PROCEDURE — 82306 VITAMIN D 25 HYDROXY: CPT | Mod: WESLAB

## 2024-06-27 PROCEDURE — 99214 OFFICE O/P EST MOD 30 MIN: CPT | Performed by: NURSE PRACTITIONER

## 2024-06-27 PROCEDURE — 83540 ASSAY OF IRON: CPT | Mod: WESLAB

## 2024-06-27 PROCEDURE — 3075F SYST BP GE 130 - 139MM HG: CPT | Performed by: NURSE PRACTITIONER

## 2024-06-27 PROCEDURE — 82607 VITAMIN B-12: CPT | Mod: WESLAB

## 2024-06-27 ASSESSMENT — COLUMBIA-SUICIDE SEVERITY RATING SCALE - C-SSRS
1. IN THE PAST MONTH, HAVE YOU WISHED YOU WERE DEAD OR WISHED YOU COULD GO TO SLEEP AND NOT WAKE UP?: NO
2. HAVE YOU ACTUALLY HAD ANY THOUGHTS OF KILLING YOURSELF?: NO
6. HAVE YOU EVER DONE ANYTHING, STARTED TO DO ANYTHING, OR PREPARED TO DO ANYTHING TO END YOUR LIFE?: NO

## 2024-06-27 ASSESSMENT — PAIN SCALES - GENERAL: PAINLEVEL: 7

## 2024-06-27 NOTE — PROGRESS NOTES
"Patient ID: Camille Romero is a 53 y.o. female.    History of Present Illness:   Patient initially referred for leukocytosis, which has now resolved. Worked up by Dr. Hoover in . She was subsequently worked up for erythrocytosis in 2017, including sleep study. Positive sleep apnea, not on CPAP. She continues to smoke, about 1/4 pack/day or less. Erythrocytosis has been attributed to smoking and sleep apnea.  On presentation today she denies any fevers, chills or night sweats.  No cough, chest pain or shortness of breath.  No nausea or vomiting.  No constipation or diarrhea.     Since our last visit patient underwent laparoscopic hysterectomy and bilateral salpingostomatomy, on May 20, 2024 for postmenopausal bleeding. Pathology negative.      Review of Systems:  A review of systems has been completed and are negative for complaints except what is stated in the HPI and/or past medical history     Allergies:  Aspirin, amoxicillin, penicillins, bupropion, pertussis vaccine, DTaP vaccine  Latex     Medications:  Vitamin D 50,000 IU daily  Vitamin B12 1000 mcg every other day  Medication list reviewed with patient and updated in EMR    Past Medical History:  Type 2 diabetes mellitus, GERD, HTN, osteoarthritis, history leukocytosis, erythrocytosis    Past Surgical History:  Tear of medial meniscus of right knee, trigger thumb, left wrist,  section, carpal tunnel release of both wrists, left knee, laparoscopic hysterectomy and bilateral salpingostomatomy for  postmenopausal bleeding     Social History:   Current every day smoker      Vital Signs:  /73 (BP Location: Right arm, Patient Position: Sitting, BP Cuff Size: Adult long)   Pulse 69   Temp 36.2 °C (97.1 °F) (Temporal)   Resp 18   Ht (S) 1.685 m (5' 6.34\")   Wt 124 kg (273 lb 7.7 oz)   SpO2 96%   BMI 43.69 kg/m²     Physical Exam:  ECO  Pain: 0  Constitutional: Well developed, awake/alert/oriented x3, no distress, alert and " cooperative  Eyes: PER. sclera anicteric  ENMT: Oral mucosa moist  Respiratory/Thorax: Breathing is non-labored. Lungs are clear to auscultation bilaterally. No adventitious breath sounds  Cardiovascular: S1-S2. Regular rate and rhythm. No murmurs, rubs, or gallops appreciated  Gastrointestinal: Abdomen soft nontender, nondistended, normal active bowel sounds.   Musculoskeletal: ROM intact, no joint swelling, normal strength  Extremities: normal extremities, no cyanosis, no edema, no clubbing  Neurologic: alert and oriented x3. Nonfocal exam. No myoclonus  Psychological: Pleasant, appropriate and easily engaged     Lab Results:  June 25, 2024  WBC 9.2, hemoglobin 16.1, hematocrit 49.3, MCV 92, platelets 212,000    June 27, 2024  Ferritin 84, iron 82, TIBC 355, percent saturation 23  B12 1069, vitamin D 35    Assessment:  1. Leukocytosis:   - - resolved     2. Vitamin B12 deficiency:   The patient will continue with vitamin B12 orally. She is currently on 1000 mcg every other day.  We will change dose to 1000 mcg 3 days/week.     3. Tobacco cessation:   We once again discussed the need for complete and absolute tobacco cessation. She is not interested in quitting at this time.     4. Polycythemia:   Has been felt to be multifactorial previous dehydration, current tobacco use and hx sleep apnea. Patient has been worked up for sleep apnea. Not on CPAP.   Iron studies completed and found to be within normal limits thus hemochromatosis is not a concern.  We have sent off JAK2 mutation testing to rule out a primary cause.     Plan:  - 1 year  - sooner if necessary        Carl Urbano, HANSEL-CNP

## 2024-07-03 LAB
ELECTRONICALLY SIGNED BY: NORMAL
JAK2 V617F INTERPRETATION: NORMAL
JAK2 V617F RESULT: NOT DETECTED

## 2024-07-09 ENCOUNTER — LAB (OUTPATIENT)
Dept: LAB | Facility: LAB | Age: 54
End: 2024-07-09
Payer: COMMERCIAL

## 2024-07-09 DIAGNOSIS — Z13.228 SCREENING FOR METABOLIC DISORDER: ICD-10-CM

## 2024-07-09 DIAGNOSIS — E11.9 TYPE 2 DIABETES MELLITUS WITHOUT COMPLICATION, WITHOUT LONG-TERM CURRENT USE OF INSULIN (MULTI): ICD-10-CM

## 2024-07-09 DIAGNOSIS — E78.5 DYSLIPIDEMIA: ICD-10-CM

## 2024-07-09 DIAGNOSIS — I10 BENIGN ESSENTIAL HYPERTENSION: ICD-10-CM

## 2024-07-09 LAB
ALBUMIN SERPL-MCNC: 3.7 G/DL (ref 3.5–5)
ALP BLD-CCNC: 69 U/L (ref 35–125)
ALT SERPL-CCNC: 22 U/L (ref 5–40)
ANION GAP SERPL CALC-SCNC: 13 MMOL/L
AST SERPL-CCNC: 23 U/L (ref 5–40)
BASOPHILS # BLD AUTO: 0.06 X10*3/UL (ref 0–0.1)
BASOPHILS NFR BLD AUTO: 0.7 %
BILIRUB SERPL-MCNC: 0.4 MG/DL (ref 0.1–1.2)
BUN SERPL-MCNC: 15 MG/DL (ref 8–25)
CALCIUM SERPL-MCNC: 9.1 MG/DL (ref 8.5–10.4)
CHLORIDE SERPL-SCNC: 106 MMOL/L (ref 97–107)
CHOLEST SERPL-MCNC: 116 MG/DL (ref 133–200)
CHOLEST/HDLC SERPL: 3.9 {RATIO}
CO2 SERPL-SCNC: 23 MMOL/L (ref 24–31)
CREAT SERPL-MCNC: 0.9 MG/DL (ref 0.4–1.6)
CREAT UR-MCNC: 103.5 MG/DL
EGFRCR SERPLBLD CKD-EPI 2021: 77 ML/MIN/1.73M*2
EOSINOPHIL # BLD AUTO: 0.64 X10*3/UL (ref 0–0.7)
EOSINOPHIL NFR BLD AUTO: 7.1 %
ERYTHROCYTE [DISTWIDTH] IN BLOOD BY AUTOMATED COUNT: 12.7 % (ref 11.5–14.5)
EST. AVERAGE GLUCOSE BLD GHB EST-MCNC: 148 MG/DL
GLUCOSE SERPL-MCNC: 142 MG/DL (ref 65–99)
HBA1C MFR BLD: 6.8 %
HCT VFR BLD AUTO: 47.6 % (ref 36–46)
HDLC SERPL-MCNC: 30 MG/DL
HGB BLD-MCNC: 15.7 G/DL (ref 12–16)
IMM GRANULOCYTES # BLD AUTO: 0.03 X10*3/UL (ref 0–0.7)
IMM GRANULOCYTES NFR BLD AUTO: 0.3 % (ref 0–0.9)
LDLC SERPL CALC-MCNC: 56 MG/DL (ref 65–130)
LYMPHOCYTES # BLD AUTO: 3.31 X10*3/UL (ref 1.2–4.8)
LYMPHOCYTES NFR BLD AUTO: 36.5 %
MCH RBC QN AUTO: 30.8 PG (ref 26–34)
MCHC RBC AUTO-ENTMCNC: 33 G/DL (ref 32–36)
MCV RBC AUTO: 93 FL (ref 80–100)
MICROALBUMIN UR-MCNC: <12 MG/L (ref 0–23)
MICROALBUMIN/CREAT UR: NORMAL MG/G{CREAT}
MONOCYTES # BLD AUTO: 0.52 X10*3/UL (ref 0.1–1)
MONOCYTES NFR BLD AUTO: 5.7 %
NEUTROPHILS # BLD AUTO: 4.51 X10*3/UL (ref 1.2–7.7)
NEUTROPHILS NFR BLD AUTO: 49.7 %
NRBC BLD-RTO: 0 /100 WBCS (ref 0–0)
PLATELET # BLD AUTO: 206 X10*3/UL (ref 150–450)
POTASSIUM SERPL-SCNC: 4.3 MMOL/L (ref 3.4–5.1)
PROT SERPL-MCNC: 6.2 G/DL (ref 5.9–7.9)
RBC # BLD AUTO: 5.1 X10*6/UL (ref 4–5.2)
SODIUM SERPL-SCNC: 142 MMOL/L (ref 133–145)
TRIGL SERPL-MCNC: 149 MG/DL (ref 40–150)
TSH SERPL DL<=0.05 MIU/L-ACNC: 2.72 MIU/L (ref 0.27–4.2)
WBC # BLD AUTO: 9.1 X10*3/UL (ref 4.4–11.3)

## 2024-07-09 PROCEDURE — 80053 COMPREHEN METABOLIC PANEL: CPT

## 2024-07-09 PROCEDURE — 80061 LIPID PANEL: CPT

## 2024-07-09 PROCEDURE — 85025 COMPLETE CBC W/AUTO DIFF WBC: CPT

## 2024-07-09 PROCEDURE — 83036 HEMOGLOBIN GLYCOSYLATED A1C: CPT

## 2024-07-09 PROCEDURE — 82570 ASSAY OF URINE CREATININE: CPT

## 2024-07-09 PROCEDURE — 84443 ASSAY THYROID STIM HORMONE: CPT

## 2024-07-09 PROCEDURE — 36415 COLL VENOUS BLD VENIPUNCTURE: CPT

## 2024-07-09 PROCEDURE — 82043 UR ALBUMIN QUANTITATIVE: CPT

## 2024-07-11 ASSESSMENT — ENCOUNTER SYMPTOMS
DEPRESSION: 0
OCCASIONAL FEELINGS OF UNSTEADINESS: 0
LOSS OF SENSATION IN FEET: 0

## 2024-07-12 ENCOUNTER — OFFICE VISIT (OUTPATIENT)
Dept: PRIMARY CARE | Facility: CLINIC | Age: 54
End: 2024-07-12
Payer: COMMERCIAL

## 2024-07-12 VITALS
DIASTOLIC BLOOD PRESSURE: 72 MMHG | TEMPERATURE: 97.9 F | WEIGHT: 273 LBS | SYSTOLIC BLOOD PRESSURE: 128 MMHG | HEART RATE: 64 BPM | OXYGEN SATURATION: 97 % | BODY MASS INDEX: 43.61 KG/M2

## 2024-07-12 DIAGNOSIS — Z00.00 ANNUAL PHYSICAL EXAM: Primary | ICD-10-CM

## 2024-07-12 DIAGNOSIS — E11.9 TYPE 2 DIABETES MELLITUS WITHOUT COMPLICATION, WITHOUT LONG-TERM CURRENT USE OF INSULIN (MULTI): ICD-10-CM

## 2024-07-12 DIAGNOSIS — E78.5 DYSLIPIDEMIA: ICD-10-CM

## 2024-07-12 DIAGNOSIS — I10 BENIGN ESSENTIAL HYPERTENSION: ICD-10-CM

## 2024-07-12 DIAGNOSIS — F41.9 ANXIETY: ICD-10-CM

## 2024-07-12 DIAGNOSIS — E78.2 MIXED HYPERLIPIDEMIA: ICD-10-CM

## 2024-07-12 PROCEDURE — 3078F DIAST BP <80 MM HG: CPT | Performed by: INTERNAL MEDICINE

## 2024-07-12 PROCEDURE — 4010F ACE/ARB THERAPY RXD/TAKEN: CPT | Performed by: INTERNAL MEDICINE

## 2024-07-12 PROCEDURE — 99396 PREV VISIT EST AGE 40-64: CPT | Performed by: INTERNAL MEDICINE

## 2024-07-12 PROCEDURE — 3048F LDL-C <100 MG/DL: CPT | Performed by: INTERNAL MEDICINE

## 2024-07-12 PROCEDURE — 3044F HG A1C LEVEL LT 7.0%: CPT | Performed by: INTERNAL MEDICINE

## 2024-07-12 PROCEDURE — 3074F SYST BP LT 130 MM HG: CPT | Performed by: INTERNAL MEDICINE

## 2024-07-12 PROCEDURE — 3062F POS MACROALBUMINURIA REV: CPT | Performed by: INTERNAL MEDICINE

## 2024-07-12 RX ORDER — PRAVASTATIN SODIUM 80 MG/1
80 TABLET ORAL DAILY
Qty: 90 TABLET | Refills: 1 | Status: SHIPPED | OUTPATIENT
Start: 2024-07-12

## 2024-07-12 RX ORDER — EZETIMIBE 10 MG/1
10 TABLET ORAL DAILY
Qty: 90 TABLET | Refills: 1 | Status: SHIPPED | OUTPATIENT
Start: 2024-07-12

## 2024-07-12 RX ORDER — TRAMADOL HYDROCHLORIDE 50 MG/1
1 TABLET ORAL
COMMUNITY
Start: 2024-04-26

## 2024-07-12 RX ORDER — DULAGLUTIDE 1.5 MG/.5ML
1.5 INJECTION, SOLUTION SUBCUTANEOUS
Qty: 2 ML | Refills: 11 | Status: SHIPPED | OUTPATIENT
Start: 2024-07-14

## 2024-07-12 RX ORDER — ESCITALOPRAM OXALATE 20 MG/1
20 TABLET ORAL NIGHTLY
Qty: 90 TABLET | Refills: 1 | Status: SHIPPED | OUTPATIENT
Start: 2024-07-12 | End: 2025-01-08

## 2024-07-12 RX ORDER — VERAPAMIL HYDROCHLORIDE 120 MG/1
120 TABLET, FILM COATED, EXTENDED RELEASE ORAL NIGHTLY
Qty: 90 TABLET | Refills: 1 | Status: SHIPPED | OUTPATIENT
Start: 2024-07-12 | End: 2025-01-08

## 2024-07-12 RX ORDER — POTASSIUM CHLORIDE 750 MG/1
20 TABLET, FILM COATED, EXTENDED RELEASE ORAL DAILY
COMMUNITY

## 2024-07-12 ASSESSMENT — ENCOUNTER SYMPTOMS
FATIGUE: 0
SEIZURES: 0
SHORTNESS OF BREATH: 0
PALPITATIONS: 0
ABDOMINAL PAIN: 0
TREMORS: 0
BLOOD IN STOOL: 0
NAUSEA: 0
FREQUENCY: 0
ARTHRALGIAS: 1
MYALGIAS: 0
NUMBNESS: 0
CHILLS: 0
COUGH: 0
VOMITING: 0
POLYPHAGIA: 0
BACK PAIN: 1
DYSURIA: 0
POLYDIPSIA: 0
TROUBLE SWALLOWING: 0
WHEEZING: 0
SINUS PRESSURE: 0

## 2024-07-12 ASSESSMENT — PAIN SCALES - GENERAL: PAINLEVEL: 6

## 2024-07-12 NOTE — PROGRESS NOTES
Subjective   Patient ID: Camille Romero is a 53 y.o. female who presents for Annual Exam.    HPI     Has history of hypertension, hyperlipidemia, type 2 diabetes mellitus, depression, coronary artery disease, migraine headaches.         History of coronary artery disease status post CABG in 2013-follows with cardiologist Dr Bejarano. Was advised to follow up as needed.         History of diabetes mellitus-Denied any hypoglycemic episodes. Has free style Melissa.  Ozempic was not covered,   Stopped Trulicity as she was waiting for ozempic         History of hyperlipidemia-compliant with medications.         H/O Migraine headaches- Taking Amitriptyline for migraine headaches.         Takes Gabapentin for neuropathy and migraine headaches.         H/O Tobacco dependence- smokes pack every 2-3 days. Started smoking when she was 15 years old.         Seeing Dr Smith for left knee pain-    Seeing Dr Mckay for low back pain,     . Polycythemia: Seeing Hematology  Has been felt to be multifactorial previous dehydration, current tobacco use and hx sleep apnea. Patient has been worked up for sleep apnea. Not on CPAP.   Iron studies completed and found to be within normal limits thus hemochromatosis is not a concern.  JAK2 mutation negative    Review of Systems   Constitutional:  Negative for chills and fatigue.   HENT:  Negative for postnasal drip, sinus pressure and trouble swallowing.    Respiratory:  Negative for cough, shortness of breath and wheezing.    Cardiovascular:  Negative for chest pain, palpitations and leg swelling.   Gastrointestinal:  Negative for abdominal pain, blood in stool, nausea and vomiting.   Endocrine: Negative for polydipsia, polyphagia and polyuria.   Genitourinary:  Negative for dysuria and frequency.   Musculoskeletal:  Positive for arthralgias and back pain. Negative for myalgias.   Skin:  Negative for rash.   Neurological:  Negative for tremors, seizures and numbness.    Psychiatric/Behavioral:  Negative for behavioral problems.        Objective   /72 (BP Location: Left arm, Patient Position: Sitting, BP Cuff Size: Adult)   Pulse 64   Temp 36.6 °C (97.9 °F) (Temporal)   Wt 124 kg (273 lb)   SpO2 97%   BMI 43.61 kg/m²     Physical Exam  Constitutional:       General: She is not in acute distress.     Appearance: Normal appearance.   HENT:      Head: Normocephalic and atraumatic.      Right Ear: Tympanic membrane normal.      Left Ear: Tympanic membrane normal.   Eyes:      Extraocular Movements: Extraocular movements intact.      Pupils: Pupils are equal, round, and reactive to light.   Cardiovascular:      Rate and Rhythm: Normal rate and regular rhythm.      Heart sounds: Normal heart sounds. No murmur heard.     No friction rub.   Pulmonary:      Effort: Pulmonary effort is normal.      Breath sounds: Normal breath sounds. No wheezing or rales.   Abdominal:      General: Bowel sounds are normal.      Palpations: Abdomen is soft.      Tenderness: There is no abdominal tenderness. There is no guarding.   Musculoskeletal:         General: Normal range of motion.      Right lower leg: No edema.      Left lower leg: No edema.      Comments: Uses cane to walk   Skin:     Findings: No rash.   Neurological:      Mental Status: She is alert and oriented to person, place, and time.      Cranial Nerves: No cranial nerve deficit.   Psychiatric:         Mood and Affect: Mood normal.         Assessment/Plan       Camille was seen today for annual exam.  Diagnoses and all orders for this visit:  Annual physical exam (Primary)  Dyslipidemia  -     ezetimibe (Zetia) 10 mg tablet; Take 1 tablet (10 mg) by mouth once daily.  Mixed hyperlipidemia  -     pravastatin (Pravachol) 80 mg tablet; Take 1 tablet (80 mg) by mouth once daily.  Anxiety  -     escitalopram (Lexapro) 20 mg tablet; Take 1 tablet (20 mg) by mouth once daily at bedtime.  Benign essential hypertension  -     verapamil SR  "(Calan-SR) 120 mg ER tablet; Take 1 tablet (120 mg) by mouth once daily at bedtime.  Type 2 diabetes mellitus without complication, without long-term current use of insulin (Multi)  -     dulaglutide (Trulicity) 1.5 mg/0.5 mL pen injector injection; Inject 1.5 mg under the skin 1 (one) time per week.     Restarted Trulicity    Lab Results   Component Value Date    HGBA1C 6.8 (H) 07/09/2024      Lab Results   Component Value Date    WBC 9.1 07/09/2024    HGB 15.7 07/09/2024    HCT 47.6 (H) 07/09/2024    MCV 93 07/09/2024     07/09/2024     Lab Results   Component Value Date    GLUCOSE 142 (H) 07/09/2024    CALCIUM 9.1 07/09/2024     07/09/2024    K 4.3 07/09/2024    CO2 23 (L) 07/09/2024     07/09/2024    BUN 15 07/09/2024    CREATININE 0.90 07/09/2024     Lab Results   Component Value Date    CHOL 116 (L) 07/09/2024    CHOL 113 (L) 02/18/2023    CHOL 110 (L) 02/01/2022     Lab Results   Component Value Date    HDL 30.0 (L) 07/09/2024    HDL 25 (L) 02/18/2023    HDL 23 (L) 02/01/2022     Lab Results   Component Value Date    LDLCALC 56 (L) 07/09/2024    LDLCALC 64 (L) 02/18/2023    LDLCALC 59 (L) 02/01/2022     Lab Results   Component Value Date    TRIG 149 07/09/2024    TRIG 118 02/18/2023    TRIG 138 02/01/2022     No components found for: \"CHOLHDL\"      Current Outpatient Medications   Medication Instructions    albuterol (Proventil HFA) 90 mcg/actuation inhaler 2 puffs, inhalation, Every 4 hours PRN    amitriptyline (ELAVIL) 50 mg, oral, Nightly    blood-glucose meter misc Twice daily    butalbital-acetaminophen-caff -40 mg tablet  TAKE 1 TO 2 TABLET BY MOUTH EVERY 4 HOURS AS NEEDED F    carvedilol (COREG) 12.5 mg, oral, 2 times daily, With food    cyanocobalamin, vitamin B-12, (VITAMIN B-12 ORAL) 1000 MCG  as directed Orally    docusate sodium (COLACE) 100 mg, oral, 2 times daily PRN    ergocalciferol (Vitamin D-2) 1.25 MG (21986 UT) capsule 1 capsule, oral, Once Weekly, THURSDAY    " escitalopram (LEXAPRO) 20 mg, oral, Nightly    ezetimibe (ZETIA) 10 mg, oral, Daily    FreeStyle Lancets 28 gauge USE AS DIRECTED EVERY 12 HOURS    Gralise 600 mg tablet extended release 24 hr 2 tablets, oral, Nightly    hydroCHLOROthiazide (MICROZIDE) 12.5 mg, oral, Every morning    ibuprofen 800 mg, oral, Every 6 hours PRN    OneTouch Delica Plus Lancet 33 gauge misc 1 each, subcutaneous, 2 times daily    potassium chloride CR 10 mEq ER tablet 20 mEq, oral, Daily, Do not crush, chew, or split.    pravastatin (PRAVACHOL) 80 mg, oral, Daily    semaglutide 0.25 mg, subcutaneous, Once Weekly, From week 5 start taking 0.5 mg every week    topiramate (TOPAMAX) 200 mg, oral, Nightly    traMADol (Ultram) 50 mg tablet 1 tablet, oral, 3 times daily (0900,1400,1900)    [START ON 7/14/2024] Trulicity 1.5 mg, subcutaneous, Once Weekly    valsartan (DIOVAN) 40 mg, oral, Daily    verapamil SR (CALAN-SR) 120 mg, oral, Nightly

## 2024-07-25 DIAGNOSIS — I10 BENIGN ESSENTIAL HYPERTENSION: Primary | ICD-10-CM

## 2024-07-25 RX ORDER — POTASSIUM CHLORIDE 750 MG/1
20 TABLET, FILM COATED, EXTENDED RELEASE ORAL DAILY
Qty: 180 TABLET | Refills: 1 | Status: SHIPPED | OUTPATIENT
Start: 2024-07-25 | End: 2025-01-21

## 2024-09-20 DIAGNOSIS — E11.9 TYPE 2 DIABETES MELLITUS WITHOUT COMPLICATION, WITHOUT LONG-TERM CURRENT USE OF INSULIN (MULTI): Primary | ICD-10-CM

## 2024-09-20 RX ORDER — BLOOD-GLUCOSE SENSOR
1 EACH MISCELLANEOUS CONTINUOUS
Qty: 2 EACH | Refills: 5 | Status: SHIPPED | OUTPATIENT
Start: 2024-09-20

## 2024-09-20 RX ORDER — BLOOD-GLUCOSE SENSOR
1 EACH MISCELLANEOUS CONTINUOUS
COMMUNITY
End: 2024-09-20 | Stop reason: SDUPTHER

## 2024-10-14 ENCOUNTER — OFFICE VISIT (OUTPATIENT)
Dept: PRIMARY CARE | Facility: CLINIC | Age: 54
End: 2024-10-14
Payer: COMMERCIAL

## 2024-10-14 VITALS
HEART RATE: 75 BPM | DIASTOLIC BLOOD PRESSURE: 60 MMHG | TEMPERATURE: 97.2 F | BODY MASS INDEX: 42.02 KG/M2 | SYSTOLIC BLOOD PRESSURE: 98 MMHG | WEIGHT: 263 LBS | OXYGEN SATURATION: 98 %

## 2024-10-14 DIAGNOSIS — E66.01 SEVERE OBESITY (BMI >= 40) (MULTI): ICD-10-CM

## 2024-10-14 DIAGNOSIS — I10 PRIMARY HYPERTENSION: ICD-10-CM

## 2024-10-14 DIAGNOSIS — E11.9 TYPE 2 DIABETES MELLITUS WITHOUT COMPLICATION, WITHOUT LONG-TERM CURRENT USE OF INSULIN (MULTI): Primary | ICD-10-CM

## 2024-10-14 DIAGNOSIS — M25.511 CHRONIC PAIN OF BOTH SHOULDERS: ICD-10-CM

## 2024-10-14 DIAGNOSIS — M65.312 TRIGGER FINGER OF LEFT THUMB: ICD-10-CM

## 2024-10-14 DIAGNOSIS — M25.512 CHRONIC PAIN OF BOTH SHOULDERS: ICD-10-CM

## 2024-10-14 DIAGNOSIS — G62.9 NEUROPATHY: ICD-10-CM

## 2024-10-14 DIAGNOSIS — G89.29 CHRONIC PAIN OF BOTH SHOULDERS: ICD-10-CM

## 2024-10-14 DIAGNOSIS — Z23 ENCOUNTER FOR IMMUNIZATION: ICD-10-CM

## 2024-10-14 LAB — POC HEMOGLOBIN A1C: 5.5 % (ref 4.2–6.5)

## 2024-10-14 PROCEDURE — 3062F POS MACROALBUMINURIA REV: CPT | Performed by: INTERNAL MEDICINE

## 2024-10-14 PROCEDURE — 3074F SYST BP LT 130 MM HG: CPT | Performed by: INTERNAL MEDICINE

## 2024-10-14 PROCEDURE — 3078F DIAST BP <80 MM HG: CPT | Performed by: INTERNAL MEDICINE

## 2024-10-14 PROCEDURE — 83036 HEMOGLOBIN GLYCOSYLATED A1C: CPT | Performed by: INTERNAL MEDICINE

## 2024-10-14 PROCEDURE — 3044F HG A1C LEVEL LT 7.0%: CPT | Performed by: INTERNAL MEDICINE

## 2024-10-14 PROCEDURE — 3048F LDL-C <100 MG/DL: CPT | Performed by: INTERNAL MEDICINE

## 2024-10-14 PROCEDURE — 90656 IIV3 VACC NO PRSV 0.5 ML IM: CPT | Performed by: INTERNAL MEDICINE

## 2024-10-14 PROCEDURE — 99214 OFFICE O/P EST MOD 30 MIN: CPT | Performed by: INTERNAL MEDICINE

## 2024-10-14 PROCEDURE — 4010F ACE/ARB THERAPY RXD/TAKEN: CPT | Performed by: INTERNAL MEDICINE

## 2024-10-14 RX ORDER — VALSARTAN 40 MG/1
80 TABLET ORAL DAILY
Qty: 180 TABLET | Refills: 1 | Status: SHIPPED | OUTPATIENT
Start: 2024-10-14 | End: 2025-04-12

## 2024-10-14 RX ORDER — CARVEDILOL 12.5 MG/1
12.5 TABLET ORAL 2 TIMES DAILY
Qty: 180 TABLET | Refills: 1 | Status: SHIPPED | OUTPATIENT
Start: 2024-10-14 | End: 2025-04-12

## 2024-10-14 RX ORDER — VALSARTAN 40 MG/1
80 TABLET ORAL DAILY
Qty: 180 TABLET | Refills: 1 | Status: SHIPPED | OUTPATIENT
Start: 2024-10-14 | End: 2024-10-14

## 2024-10-14 RX ORDER — HYDROCHLOROTHIAZIDE 12.5 MG/1
12.5 CAPSULE ORAL EVERY MORNING
Qty: 90 CAPSULE | Refills: 1 | Status: SHIPPED | OUTPATIENT
Start: 2024-10-14 | End: 2025-04-12

## 2024-10-14 RX ORDER — AMITRIPTYLINE HYDROCHLORIDE 25 MG/1
50 TABLET, FILM COATED ORAL NIGHTLY
Qty: 180 TABLET | Refills: 1 | Status: SHIPPED | OUTPATIENT
Start: 2024-10-14 | End: 2025-04-12

## 2024-10-14 RX ORDER — AMITRIPTYLINE HYDROCHLORIDE 25 MG/1
50 TABLET, FILM COATED ORAL NIGHTLY
Qty: 180 TABLET | Refills: 1 | Status: SHIPPED | OUTPATIENT
Start: 2024-10-14 | End: 2024-10-14

## 2024-10-14 ASSESSMENT — ENCOUNTER SYMPTOMS
PALPITATIONS: 0
COUGH: 0
SEIZURES: 0
FREQUENCY: 0
SHORTNESS OF BREATH: 0
ABDOMINAL PAIN: 0
OCCASIONAL FEELINGS OF UNSTEADINESS: 0
NAUSEA: 0
POLYPHAGIA: 0
SINUS PRESSURE: 0
WHEEZING: 0
NUMBNESS: 0
TROUBLE SWALLOWING: 0
POLYDIPSIA: 0
CHILLS: 0
DYSURIA: 0
DEPRESSION: 0
FATIGUE: 0
LOSS OF SENSATION IN FEET: 0
BLOOD IN STOOL: 0
MYALGIAS: 0
BACK PAIN: 1
ARTHRALGIAS: 1
TREMORS: 0
VOMITING: 0

## 2024-10-14 ASSESSMENT — PATIENT HEALTH QUESTIONNAIRE - PHQ9
2. FEELING DOWN, DEPRESSED OR HOPELESS: NOT AT ALL
SUM OF ALL RESPONSES TO PHQ9 QUESTIONS 1 AND 2: 0
1. LITTLE INTEREST OR PLEASURE IN DOING THINGS: NOT AT ALL

## 2024-10-14 ASSESSMENT — PAIN SCALES - GENERAL: PAINLEVEL: 10-WORST PAIN EVER

## 2024-10-14 NOTE — PROGRESS NOTES
Subjective   Patient ID: Camille Romero is a 54 y.o. female who presents for Follow-up (3mon follow up).    HPI     Has history of hypertension, hyperlipidemia, type 2 diabetes mellitus, depression, coronary artery disease, migraine headaches.         History of coronary artery disease status post CABG in 2013-follows with cardiologist Dr Bejarano. Was advised to follow up as needed.         History of diabetes mellitus-Denied any hypoglycemic episodes. Has free style Melissa.  Ozempic was not covered,            History of hyperlipidemia-compliant with medications.         H/O Migraine headaches- Taking Amitriptyline for migraine headaches.         Takes Gabapentin for neuropathy and migraine headaches.         H/O Tobacco dependence- smokes pack every 2-3 days. Started smoking when she was 15 years old.         Seeing Dr Has for left knee pain-    Seeing Dr Mckay for low back pain,     . Polycythemia: Seeing Hematology  Has been felt to be multifactorial previous dehydration, current tobacco use and hx sleep apnea. Patient has been worked up for sleep apnea. Not on CPAP.   Iron studies completed and found to be within normal limits thus hemochromatosis is not a concern.  JAK2 mutation negative      Left thumb trigger finger, seeing pain management    Complaining of bilateral shoulder plain, worse on left side compared to right  Range of motion reduced    Review of Systems   Constitutional:  Negative for chills and fatigue.   HENT:  Negative for postnasal drip, sinus pressure and trouble swallowing.    Respiratory:  Negative for cough, shortness of breath and wheezing.    Cardiovascular:  Negative for chest pain, palpitations and leg swelling.   Gastrointestinal:  Negative for abdominal pain, blood in stool, nausea and vomiting.   Endocrine: Negative for polydipsia, polyphagia and polyuria.   Genitourinary:  Negative for dysuria and frequency.   Musculoskeletal:  Positive for arthralgias and back pain.  Negative for myalgias.   Skin:  Negative for rash.   Neurological:  Negative for tremors, seizures and numbness.   Psychiatric/Behavioral:  Negative for behavioral problems.        Objective   BP 98/60 (BP Location: Left arm, Patient Position: Sitting, BP Cuff Size: Adult)   Pulse 75   Temp 36.2 °C (97.2 °F) (Temporal)   Wt 119 kg (263 lb)   SpO2 98%   BMI 42.02 kg/m²     Physical Exam  Constitutional:       General: She is not in acute distress.     Appearance: Normal appearance.   HENT:      Head: Normocephalic and atraumatic.      Right Ear: Tympanic membrane normal.      Left Ear: Tympanic membrane normal.   Eyes:      Extraocular Movements: Extraocular movements intact.      Pupils: Pupils are equal, round, and reactive to light.   Cardiovascular:      Rate and Rhythm: Normal rate and regular rhythm.      Heart sounds: Normal heart sounds. No murmur heard.     No friction rub.   Pulmonary:      Effort: Pulmonary effort is normal.      Breath sounds: Normal breath sounds. No wheezing or rales.   Abdominal:      General: Bowel sounds are normal.      Palpations: Abdomen is soft.      Tenderness: There is no abdominal tenderness. There is no guarding.   Musculoskeletal:         General: Normal range of motion.      Right lower leg: No edema.      Left lower leg: No edema.      Comments: Uses cane to walk   Skin:     Findings: No rash.   Neurological:      Mental Status: She is alert and oriented to person, place, and time.      Cranial Nerves: No cranial nerve deficit.   Psychiatric:         Mood and Affect: Mood normal.         Assessment/Plan       Camille was seen today for follow-up.  Diagnoses and all orders for this visit:  Type 2 diabetes mellitus without complication, without long-term current use of insulin (Multi) (Primary)  -     valsartan (Diovan) 40 mg tablet; Take 2 tablets (80 mg) by mouth once daily.  -     POCT glycosylated hemoglobin (Hb A1C) manually resulted  Primary hypertension  -      "carvedilol (Coreg) 12.5 mg tablet; Take 1 tablet (12.5 mg) by mouth 2 times a day. With food  -     amitriptyline (Elavil) 25 mg tablet; Take 2 tablets (50 mg) by mouth once daily at bedtime.  -     hydroCHLOROthiazide (Microzide) 12.5 mg capsule; Take 1 capsule (12.5 mg) by mouth once daily in the morning.  Encounter for immunization  Chronic pain of both shoulders  -     XR shoulder left 2+ views; Future  -     XR shoulder right 2+ views; Future  -     Referral to Physical Therapy; Future  Other orders  -     Flu vaccine, trivalent, preservative free, age 6 months and greater (Fluraix/Fluzone/Flulaval)     Lost 10 pounds in last 3 months after Trulicity was increased, advised to continue with low-carb diet, limit daily calories to less than 1800 a day denied any hypoglycemic episodes    Lab Results   Component Value Date    HGBA1C 5.5 10/14/2024      Lab Results   Component Value Date    WBC 9.1 07/09/2024    HGB 15.7 07/09/2024    HCT 47.6 (H) 07/09/2024    MCV 93 07/09/2024     07/09/2024     Lab Results   Component Value Date    GLUCOSE 142 (H) 07/09/2024    CALCIUM 9.1 07/09/2024     07/09/2024    K 4.3 07/09/2024    CO2 23 (L) 07/09/2024     07/09/2024    BUN 15 07/09/2024    CREATININE 0.90 07/09/2024     Lab Results   Component Value Date    CHOL 116 (L) 07/09/2024    CHOL 113 (L) 02/18/2023    CHOL 110 (L) 02/01/2022     Lab Results   Component Value Date    HDL 30.0 (L) 07/09/2024    HDL 25 (L) 02/18/2023    HDL 23 (L) 02/01/2022     Lab Results   Component Value Date    LDLCALC 56 (L) 07/09/2024    LDLCALC 64 (L) 02/18/2023    LDLCALC 59 (L) 02/01/2022     Lab Results   Component Value Date    TRIG 149 07/09/2024    TRIG 118 02/18/2023    TRIG 138 02/01/2022     No components found for: \"CHOLHDL\"      Current Outpatient Medications   Medication Instructions    albuterol (Proventil HFA) 90 mcg/actuation inhaler 2 puffs, inhalation, Every 4 hours PRN    amitriptyline (ELAVIL) 50 mg, " oral, Nightly    blood-glucose meter misc Twice daily    butalbital-acetaminophen-caff -40 mg tablet  TAKE 1 TO 2 TABLET BY MOUTH EVERY 4 HOURS AS NEEDED F    carvedilol (COREG) 12.5 mg, oral, 2 times daily, With food    cyanocobalamin, vitamin B-12, (VITAMIN B-12 ORAL) 1000 MCG  as directed Orally    docusate sodium (COLACE) 100 mg, oral, 2 times daily PRN    dulaglutide 3 mg, subcutaneous, Weekly    ergocalciferol (Vitamin D-2) 1.25 MG (74960 UT) capsule 1 capsule, oral, Once Weekly, THURSDAY    escitalopram (LEXAPRO) 20 mg, oral, Nightly    ezetimibe (ZETIA) 10 mg, oral, Daily    FreeStyle Lancets 28 gauge USE AS DIRECTED EVERY 12 HOURS    FreeStyle Melissa 3 Sensor device 1 each, miscellaneous, Continuous    Gralise 600 mg tablet extended release 24 hr 2 tablets, oral, Nightly    hydroCHLOROthiazide (MICROZIDE) 12.5 mg, oral, Every morning    ibuprofen 800 mg, oral, Every 6 hours PRN    potassium chloride CR 10 mEq ER tablet 20 mEq, oral, Daily, Do not crush, chew, or split.    pravastatin (PRAVACHOL) 80 mg, oral, Daily    topiramate (TOPAMAX) 200 mg, oral, Nightly    traMADol (Ultram) 50 mg tablet 1 tablet, oral, 3 times daily (0900,1400,1900)    valsartan (DIOVAN) 80 mg, oral, Daily    verapamil SR (CALAN-SR) 120 mg, oral, Nightly

## 2024-10-17 DIAGNOSIS — E11.9 TYPE 2 DIABETES MELLITUS WITHOUT COMPLICATION, WITHOUT LONG-TERM CURRENT USE OF INSULIN (MULTI): ICD-10-CM

## 2024-10-17 RX ORDER — DULAGLUTIDE 1.5 MG/.5ML
1.5 INJECTION, SOLUTION SUBCUTANEOUS
Qty: 2 ML | Refills: 0 | OUTPATIENT
Start: 2024-10-20

## 2024-10-29 ENCOUNTER — HOSPITAL ENCOUNTER (OUTPATIENT)
Dept: RADIOLOGY | Facility: HOSPITAL | Age: 54
Discharge: HOME | End: 2024-10-29
Payer: COMMERCIAL

## 2024-10-29 DIAGNOSIS — G89.29 CHRONIC PAIN OF BOTH SHOULDERS: ICD-10-CM

## 2024-10-29 DIAGNOSIS — M25.511 CHRONIC PAIN OF BOTH SHOULDERS: ICD-10-CM

## 2024-10-29 DIAGNOSIS — M25.512 CHRONIC PAIN OF BOTH SHOULDERS: ICD-10-CM

## 2024-10-29 PROCEDURE — 73030 X-RAY EXAM OF SHOULDER: CPT | Mod: LT

## 2024-10-29 PROCEDURE — 73030 X-RAY EXAM OF SHOULDER: CPT | Mod: RT

## 2024-11-04 ENCOUNTER — TELEPHONE (OUTPATIENT)
Dept: PRIMARY CARE | Facility: CLINIC | Age: 54
End: 2024-11-04
Payer: COMMERCIAL

## 2024-11-04 DIAGNOSIS — G89.29 CHRONIC PAIN OF BOTH SHOULDERS: Primary | ICD-10-CM

## 2024-11-04 DIAGNOSIS — M25.512 CHRONIC PAIN OF BOTH SHOULDERS: Primary | ICD-10-CM

## 2024-11-04 DIAGNOSIS — M25.511 CHRONIC PAIN OF BOTH SHOULDERS: Primary | ICD-10-CM

## 2024-11-12 ENCOUNTER — OFFICE VISIT (OUTPATIENT)
Dept: ORTHOPEDIC SURGERY | Facility: CLINIC | Age: 54
End: 2024-11-12
Payer: COMMERCIAL

## 2024-11-12 VITALS — BODY MASS INDEX: 38.49 KG/M2 | WEIGHT: 254 LBS | HEIGHT: 68 IN

## 2024-11-12 DIAGNOSIS — M75.52 ACUTE BURSITIS OF LEFT SHOULDER: ICD-10-CM

## 2024-11-12 DIAGNOSIS — S43.491A OTHER SPRAIN OF RIGHT SHOULDER JOINT, INITIAL ENCOUNTER: ICD-10-CM

## 2024-11-12 DIAGNOSIS — M75.42 ROTATOR CUFF IMPINGEMENT SYNDROME OF LEFT SHOULDER: ICD-10-CM

## 2024-11-12 DIAGNOSIS — S43.492A OTHER SPRAIN OF LEFT SHOULDER JOINT, INITIAL ENCOUNTER: ICD-10-CM

## 2024-11-12 DIAGNOSIS — M75.41 ROTATOR CUFF IMPINGEMENT SYNDROME OF RIGHT SHOULDER: ICD-10-CM

## 2024-11-12 DIAGNOSIS — M25.511 CHRONIC PAIN OF BOTH SHOULDERS: Primary | ICD-10-CM

## 2024-11-12 DIAGNOSIS — M75.51 ACUTE BURSITIS OF RIGHT SHOULDER: ICD-10-CM

## 2024-11-12 DIAGNOSIS — G89.29 CHRONIC PAIN OF BOTH SHOULDERS: Primary | ICD-10-CM

## 2024-11-12 DIAGNOSIS — M25.512 CHRONIC PAIN OF BOTH SHOULDERS: Primary | ICD-10-CM

## 2024-11-12 PROCEDURE — 99203 OFFICE O/P NEW LOW 30 MIN: CPT | Performed by: PHYSICIAN ASSISTANT

## 2024-11-12 PROCEDURE — 3008F BODY MASS INDEX DOCD: CPT | Performed by: PHYSICIAN ASSISTANT

## 2024-11-12 PROCEDURE — 3048F LDL-C <100 MG/DL: CPT | Performed by: PHYSICIAN ASSISTANT

## 2024-11-12 PROCEDURE — 4010F ACE/ARB THERAPY RXD/TAKEN: CPT | Performed by: PHYSICIAN ASSISTANT

## 2024-11-12 PROCEDURE — 99213 OFFICE O/P EST LOW 20 MIN: CPT | Performed by: PHYSICIAN ASSISTANT

## 2024-11-12 PROCEDURE — 3062F POS MACROALBUMINURIA REV: CPT | Performed by: PHYSICIAN ASSISTANT

## 2024-11-12 PROCEDURE — 3044F HG A1C LEVEL LT 7.0%: CPT | Performed by: PHYSICIAN ASSISTANT

## 2024-11-12 ASSESSMENT — PAIN - FUNCTIONAL ASSESSMENT: PAIN_FUNCTIONAL_ASSESSMENT: 0-10

## 2024-11-12 ASSESSMENT — LIFESTYLE VARIABLES
AUDIT-C TOTAL SCORE: 2
HOW OFTEN DURING THE LAST YEAR HAVE YOU HAD A FEELING OF GUILT OR REMORSE AFTER DRINKING: NEVER
HOW OFTEN DO YOU HAVE A DRINK CONTAINING ALCOHOL: 2-4 TIMES A MONTH
HAVE YOU OR SOMEONE ELSE BEEN INJURED AS A RESULT OF YOUR DRINKING: NO
HOW OFTEN DURING THE LAST YEAR HAVE YOU BEEN UNABLE TO REMEMBER WHAT HAPPENED THE NIGHT BEFORE BECAUSE YOU HAD BEEN DRINKING: NEVER
SKIP TO QUESTIONS 9-10: 1
HOW MANY STANDARD DRINKS CONTAINING ALCOHOL DO YOU HAVE ON A TYPICAL DAY: 1 OR 2
HOW OFTEN DURING THE LAST YEAR HAVE YOU FAILED TO DO WHAT WAS NORMALLY EXPECTED FROM YOU BECAUSE OF DRINKING: NEVER
HOW OFTEN DO YOU HAVE SIX OR MORE DRINKS ON ONE OCCASION: NEVER
HOW OFTEN DURING THE LAST YEAR HAVE YOU NEEDED AN ALCOHOLIC DRINK FIRST THING IN THE MORNING TO GET YOURSELF GOING AFTER A NIGHT OF HEAVY DRINKING: NEVER
HOW OFTEN DURING THE LAST YEAR HAVE YOU FOUND THAT YOU WERE NOT ABLE TO STOP DRINKING ONCE YOU HAD STARTED: NEVER

## 2024-11-12 ASSESSMENT — COLUMBIA-SUICIDE SEVERITY RATING SCALE - C-SSRS
2. HAVE YOU ACTUALLY HAD ANY THOUGHTS OF KILLING YOURSELF?: NO
1. IN THE PAST MONTH, HAVE YOU WISHED YOU WERE DEAD OR WISHED YOU COULD GO TO SLEEP AND NOT WAKE UP?: NO
6. HAVE YOU EVER DONE ANYTHING, STARTED TO DO ANYTHING, OR PREPARED TO DO ANYTHING TO END YOUR LIFE?: NO

## 2024-11-12 ASSESSMENT — PAIN DESCRIPTION - DESCRIPTORS: DESCRIPTORS: ACHING

## 2024-11-12 ASSESSMENT — PATIENT HEALTH QUESTIONNAIRE - PHQ9
1. LITTLE INTEREST OR PLEASURE IN DOING THINGS: NOT AT ALL
2. FEELING DOWN, DEPRESSED OR HOPELESS: NOT AT ALL
SUM OF ALL RESPONSES TO PHQ9 QUESTIONS 1 AND 2: 0

## 2024-11-12 ASSESSMENT — ENCOUNTER SYMPTOMS
OCCASIONAL FEELINGS OF UNSTEADINESS: 0
LOSS OF SENSATION IN FEET: 1
DEPRESSION: 0

## 2024-11-12 ASSESSMENT — PAIN SCALES - GENERAL
PAINLEVEL_OUTOF10: 5 - MODERATE PAIN
PAINLEVEL_OUTOF10: 5

## 2024-11-12 NOTE — PATIENT INSTRUCTIONS
Thank you for coming to see us today!     We are going to give you a referral for physical therapy   Please call central scheduling to make this appointment.     Please follow up with us in 4 weeks

## 2024-11-12 NOTE — PROGRESS NOTES
Subjective      Chief Complaint   Patient presents with    Right Shoulder - Pain    Left Shoulder - Pain        Past Surgical History:   Procedure Laterality Date    ADENOIDECTOMY      COLONOSCOPY      CORONARY ARTERY BYPASS GRAFT  2009    X 1 graft    TONSILLECTOMY          HPI  This 54 year old patient presents today with bilateral shoulder pain (4/10). The patient states that the bilateral shoulder pain has been present for months. The patient denies trauma or injury. She has a history of herniated disc in the lumbar spine and follows with pain management. The patient states that the bilateral shoulder pain is worse with and aggravated by reaching and lifting., and also carrying her granddaughter. The patient states that this shoulder pain is disabling and presents today to discuss further options. The patient states that they have tried tylenol and ibuprofen with no relief.    CARDIOLOGY:   Negative for chest pain, shortness of breath.   RESPIRATORY:   Negative for chest pain, shortness of breath.   MUSCULOSKELETAL:   See HPI for details.   NEUROLOGY:   Negative for tingling, numbness, weakness.    Objective      There were no vitals taken for this visit.     SHOULDER EXAM  Constitutional: Appears stated age. No apparent distress  Labored Breathing: No  Psychiatric: Normal mood and effect.   Neurological: alert and oriented x3  Skin: intact  HEENT: No bruising, otorrhea, rhinorrhea.  MUSCULOSKELETAL: Neck: No tenderness. No pain or limitation with range of motion. Back: No tenderness. Straight leg test negative bilaterally. bilateral shoulder: There is tenderness anteriorly and laterally. Active abduction and active flexion are 0-125 degrees but with pain and guarding. There is pain with and limitation of active and passive internal and external rotation. Empty can test is positive. Comparments are soft. Neurovascular is intact.     AP and lateral x-rays of the right and left shoulder done on 10- shows no  evidence of acute fracture or both destruction.     Camille was seen today for pain and pain.  Diagnoses and all orders for this visit:  Chronic pain of both shoulders (Primary)  -     Referral to Orthopaedic Surgery  Other sprain of right shoulder joint, initial encounter  Other sprain of left shoulder joint, initial encounter  Options are discussed with the patient in detail. The patient is given a prescription for physical therapy to evaluate and treat with gentle strengthening and ROM exercises with modalities as needed. The patient is instructed regarding activity modification and risk for further injury with falling or trauma, ice, provider directed at home gentle strengthening and ROM exercises, and the appropriate use of Tylenol as needed for pain with its potential adverse reactions and side effects. The patient understands.  Return in 4 weeks for reevaluation or sooner as needed.  Please note that this report has been produced using speech recognition software.  It may contain errors related to grammar, punctuation or spelling.  Electronically signed, but not reviewed.    Letty Khan PA-C

## 2024-11-21 DIAGNOSIS — E11.9 TYPE 2 DIABETES MELLITUS WITHOUT COMPLICATION, WITHOUT LONG-TERM CURRENT USE OF INSULIN (MULTI): ICD-10-CM

## 2024-11-26 ENCOUNTER — APPOINTMENT (OUTPATIENT)
Dept: PHYSICAL THERAPY | Facility: CLINIC | Age: 54
End: 2024-11-26
Payer: COMMERCIAL

## 2024-12-03 ENCOUNTER — APPOINTMENT (OUTPATIENT)
Dept: PHYSICAL THERAPY | Facility: CLINIC | Age: 54
End: 2024-12-03
Payer: COMMERCIAL

## 2024-12-10 ENCOUNTER — OFFICE VISIT (OUTPATIENT)
Dept: ORTHOPEDIC SURGERY | Facility: CLINIC | Age: 54
End: 2024-12-10
Payer: COMMERCIAL

## 2024-12-10 VITALS — HEIGHT: 68 IN | WEIGHT: 255 LBS | BODY MASS INDEX: 38.65 KG/M2

## 2024-12-10 DIAGNOSIS — M25.512 CHRONIC PAIN OF BOTH SHOULDERS: Primary | ICD-10-CM

## 2024-12-10 DIAGNOSIS — M75.41 ROTATOR CUFF IMPINGEMENT SYNDROME OF RIGHT SHOULDER: ICD-10-CM

## 2024-12-10 DIAGNOSIS — M75.52 ACUTE BURSITIS OF LEFT SHOULDER: ICD-10-CM

## 2024-12-10 DIAGNOSIS — S43.491A OTHER SPRAIN OF RIGHT SHOULDER JOINT, INITIAL ENCOUNTER: ICD-10-CM

## 2024-12-10 DIAGNOSIS — M25.511 RIGHT SHOULDER PAIN, UNSPECIFIED CHRONICITY: ICD-10-CM

## 2024-12-10 DIAGNOSIS — M25.511 CHRONIC PAIN OF BOTH SHOULDERS: Primary | ICD-10-CM

## 2024-12-10 DIAGNOSIS — M75.42 ROTATOR CUFF IMPINGEMENT SYNDROME OF LEFT SHOULDER: ICD-10-CM

## 2024-12-10 DIAGNOSIS — G89.29 CHRONIC PAIN OF BOTH SHOULDERS: Primary | ICD-10-CM

## 2024-12-10 DIAGNOSIS — M75.51 ACUTE BURSITIS OF RIGHT SHOULDER: ICD-10-CM

## 2024-12-10 PROCEDURE — 3048F LDL-C <100 MG/DL: CPT | Performed by: PHYSICIAN ASSISTANT

## 2024-12-10 PROCEDURE — 99213 OFFICE O/P EST LOW 20 MIN: CPT | Performed by: PHYSICIAN ASSISTANT

## 2024-12-10 PROCEDURE — 3062F POS MACROALBUMINURIA REV: CPT | Performed by: PHYSICIAN ASSISTANT

## 2024-12-10 PROCEDURE — 4010F ACE/ARB THERAPY RXD/TAKEN: CPT | Performed by: PHYSICIAN ASSISTANT

## 2024-12-10 PROCEDURE — 3008F BODY MASS INDEX DOCD: CPT | Performed by: PHYSICIAN ASSISTANT

## 2024-12-10 PROCEDURE — 3044F HG A1C LEVEL LT 7.0%: CPT | Performed by: PHYSICIAN ASSISTANT

## 2024-12-10 ASSESSMENT — COLUMBIA-SUICIDE SEVERITY RATING SCALE - C-SSRS
6. HAVE YOU EVER DONE ANYTHING, STARTED TO DO ANYTHING, OR PREPARED TO DO ANYTHING TO END YOUR LIFE?: NO
1. IN THE PAST MONTH, HAVE YOU WISHED YOU WERE DEAD OR WISHED YOU COULD GO TO SLEEP AND NOT WAKE UP?: NO
2. HAVE YOU ACTUALLY HAD ANY THOUGHTS OF KILLING YOURSELF?: NO

## 2024-12-10 ASSESSMENT — LIFESTYLE VARIABLES
HAS A RELATIVE, FRIEND, DOCTOR, OR ANOTHER HEALTH PROFESSIONAL EXPRESSED CONCERN ABOUT YOUR DRINKING OR SUGGESTED YOU CUT DOWN: NO
AUDIT-C TOTAL SCORE: 2
HOW OFTEN DURING THE LAST YEAR HAVE YOU BEEN UNABLE TO REMEMBER WHAT HAPPENED THE NIGHT BEFORE BECAUSE YOU HAD BEEN DRINKING: NEVER
HOW OFTEN DO YOU HAVE SIX OR MORE DRINKS ON ONE OCCASION: NEVER
HOW OFTEN DURING THE LAST YEAR HAVE YOU NEEDED AN ALCOHOLIC DRINK FIRST THING IN THE MORNING TO GET YOURSELF GOING AFTER A NIGHT OF HEAVY DRINKING: NEVER
HAVE YOU OR SOMEONE ELSE BEEN INJURED AS A RESULT OF YOUR DRINKING: NO
SKIP TO QUESTIONS 9-10: 1
HOW OFTEN DURING THE LAST YEAR HAVE YOU HAD A FEELING OF GUILT OR REMORSE AFTER DRINKING: NEVER
HOW OFTEN DURING THE LAST YEAR HAVE YOU FAILED TO DO WHAT WAS NORMALLY EXPECTED FROM YOU BECAUSE OF DRINKING: NEVER
HOW OFTEN DURING THE LAST YEAR HAVE YOU FOUND THAT YOU WERE NOT ABLE TO STOP DRINKING ONCE YOU HAD STARTED: NEVER
HOW OFTEN DO YOU HAVE A DRINK CONTAINING ALCOHOL: 2-4 TIMES A MONTH
AUDIT TOTAL SCORE: 2
HOW MANY STANDARD DRINKS CONTAINING ALCOHOL DO YOU HAVE ON A TYPICAL DAY: 1 OR 2

## 2024-12-10 ASSESSMENT — ENCOUNTER SYMPTOMS
DEPRESSION: 0
LOSS OF SENSATION IN FEET: 0
OCCASIONAL FEELINGS OF UNSTEADINESS: 0

## 2024-12-10 ASSESSMENT — PAIN - FUNCTIONAL ASSESSMENT: PAIN_FUNCTIONAL_ASSESSMENT: 0-10

## 2024-12-10 ASSESSMENT — PAIN DESCRIPTION - DESCRIPTORS: DESCRIPTORS: ACHING

## 2024-12-10 ASSESSMENT — PAIN SCALES - GENERAL
PAINLEVEL_OUTOF10: 6
PAINLEVEL_OUTOF10: 6

## 2024-12-10 NOTE — PROGRESS NOTES
Subjective      Chief Complaint   Patient presents with    Right Shoulder - Pain, Follow-up    Left Shoulder - Follow-up, Pain        Past Surgical History:   Procedure Laterality Date    ADENOIDECTOMY      COLONOSCOPY      CORONARY ARTERY BYPASS GRAFT  2009    X 1 graft    TONSILLECTOMY          HPI  This 54 year old patient presents today with bilateral shoulder pain right worse than left (7/10). The patient states that the right shoulder pain has been present for months. The patient denies trauma or injury. She has a history of herniated disc in the lumbar spine and follows with pain management. The patient states that the right shoulder pain is worse with and aggravated by reaching and lifting., and also carrying her granddaughter. The patient states that this shoulder pain is disabling and presents today to discuss further options. She was given a prescription for physical therapy and is scheduled.  She has done at home exercises with no relief of the right shoulder pain. The patient states that they have tried tylenol and ibuprofen with no relief.  She has not started into physical therapy yet due to scheduling issues and inclement weather.     CARDIOLOGY:   Negative for chest pain, shortness of breath.   RESPIRATORY:   Negative for chest pain, shortness of breath.   MUSCULOSKELETAL:   See HPI for details.   NEUROLOGY:   Negative for tingling, numbness, weakness.    Objective      There were no vitals taken for this visit.     SHOULDER EXAM  Constitutional: Appears stated age. No apparent distress  Labored Breathing: No  Psychiatric: Normal mood and effect.   Neurological: alert and oriented x3  Skin: intact  HEENT: No bruising, otorrhea, rhinorrhea.  MUSCULOSKELETAL: Neck: No tenderness. No pain or limitation with range of motion. Back: No tenderness. Straight leg test negative bilaterally. Left shoulder: There is tenderness anteriorly and laterally. Active abduction and active flexion are 0-125 degrees but  with pain and guarding. There is pain with and limitation of active and passive internal and external rotation. Empty can test is positive. Comparments are soft. Neurovascular is intact.  Right shoulder: There is tenderness anteriorly and laterally. Active abduction and active flexion are 0-110 degrees but with pain and guarding. There is pain with and limitation of active and passive internal and external rotation. Empty can test is positive. Drop arm test is positive. Garcia test is positive. Comparments are soft. Neurovascular is intact.     AP and lateral x-rays of the right and left shoulder done on 10- shows no evidence of acute fracture or both destruction.     Camille was seen today for pain, follow-up, follow-up and pain.  Diagnoses and all orders for this visit:  Chronic pain of both shoulders (Primary)  Rotator cuff impingement syndrome of right shoulder  -     MR shoulder right wo IV contrast; Future  Rotator cuff impingement syndrome of left shoulder  Acute bursitis of right shoulder  -     MR shoulder right wo IV contrast; Future  Acute bursitis of left shoulder  Right shoulder pain, unspecified chronicity  -     MR shoulder right wo IV contrast; Future  Other sprain of right shoulder joint, initial encounter  -     MR shoulder right wo IV contrast; Future    Options are discussed with the patient in detail. The patient is instructed to continue with PT. An MRI of the right shoulder is ordered in office today to further evaluate her right shoulder pain and limitation of ROM. The patient is instructed regarding activity modification and risk for further injury with falling or trauma, ice, provider directed at home gentle strengthening and ROM exercises, and the appropriate use of Tylenol as needed for pain with its potential adverse reactions and side effects. The patient understands.  Return after MRI is complete or sooner as needed.  Please note that this report has been produced using speech  recognition software.  It may contain errors related to grammar, punctuation or spelling.  Electronically signed, but not reviewed.    Letty Khan PA-C

## 2024-12-10 NOTE — PATIENT INSTRUCTIONS
Thank you for coming to see us today!     Continue to rest, ice, and elevate  Tylenol for pain control  We are ordering a RIGHT SHOULDER MRI in office today.     Please call central scheduling to schedule your MRI  Once you have a date for your MRI, call our office to schedule a follow up with Dr. Worthington

## 2024-12-14 ENCOUNTER — APPOINTMENT (OUTPATIENT)
Dept: RADIOLOGY | Facility: HOSPITAL | Age: 54
End: 2024-12-14
Payer: COMMERCIAL

## 2024-12-14 ENCOUNTER — HOSPITAL ENCOUNTER (EMERGENCY)
Facility: HOSPITAL | Age: 54
Discharge: HOME | End: 2024-12-14
Payer: COMMERCIAL

## 2024-12-14 VITALS
OXYGEN SATURATION: 100 % | RESPIRATION RATE: 16 BRPM | TEMPERATURE: 97.3 F | HEIGHT: 68 IN | HEART RATE: 75 BPM | BODY MASS INDEX: 38.65 KG/M2 | WEIGHT: 255 LBS | DIASTOLIC BLOOD PRESSURE: 90 MMHG | SYSTOLIC BLOOD PRESSURE: 134 MMHG

## 2024-12-14 DIAGNOSIS — S09.90XA CLOSED HEAD INJURY, INITIAL ENCOUNTER: ICD-10-CM
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
## 2024-12-14 PROCEDURE — 72125 CT NECK SPINE W/O DYE: CPT

## 2024-12-14 PROCEDURE — 72125 CT NECK SPINE W/O DYE: CPT | Performed by: RADIOLOGY

## 2024-12-14 PROCEDURE — 70450 CT HEAD/BRAIN W/O DYE: CPT

## 2024-12-14 PROCEDURE — 99284 EMERGENCY DEPT VISIT MOD MDM: CPT | Mod: 25

## 2024-12-14 PROCEDURE — 70450 CT HEAD/BRAIN W/O DYE: CPT | Performed by: RADIOLOGY

## 2024-12-14 NOTE — DISCHARGE INSTRUCTIONS
I would recommend that you reduce eyestrain.  Utilize over-the-counter medications and increase oral hydration.    Be sure to take all medications, over the counter medications or prescription medications only as directed.    Be sure to follow up as directed in 1-2 days. All of the details of your follow up instructions are detailed in the follow up section of this packet.    If you are being discharged with any pains medications or muscle relaxers (norco, Vicodin, hydrocodone products, Percocet, oxycodone products, flexeril, cyclobenzaprine, robaxin, norflex, brand or generic, or any other pain controlling medications with the exception of Ibuprofen and regular Tylenol, do not drive or operate machinery, climb ladders or participate in any activity that could potentially put yourself or others at risk should you get dizzy, or be/feel impaired at all.    Return to emergency room without delay for ANY new or worsening pains or for any other symptoms or concerns. Return with worsening pains, nausea, vomiting, trouble breathing, palpitations, shortness of breath, inability to pass stool or urine, loss of control of stool or urine, any numbness or tingling (that is not normal for you), uncontrolled fevers, the passing of blood or other material in stool or urine, rashes, pains or for any other symptoms or concerns you may have. You are always welcome to return to the ER at any time for any reason or for any other concerns you may have.

## 2024-12-14 NOTE — ED TRIAGE NOTES
Pt states she was walking her dog, got twisted in the leash and fell. Pt hit back of head, no loc, no thinners. Denies any other injures.

## 2024-12-14 NOTE — ED PROVIDER NOTES
HPI   Chief Complaint   Patient presents with    Fall       Patient is a 54-year-old female presenting via EMS after a fall.  She states that she was walking her dog outside of a sharp when the dog saw a different animal, cover excited and ran.  She states that she was twisted in the leash and ended up falling backwards.  She did hit her head and states she is not on blood thinners.  She states this was mechanical and she denies feeling lightheaded or dizzy prior to or after the fall.  Does endorse mild headache.  Denies blurry vision.  Patient denies fevers, chills, cough, sore throat, runny nose, chest pain, shortness of breath, abdominal pain, nausea, vomiting, diarrhea or urinary complaints.              Patient History   Past Medical History:   Diagnosis Date    Anxiety     Asthma     Coronary artery disease     Depression     GERD (gastroesophageal reflux disease)     Hyperlipidemia     Hypertension     Myocardial infarction (Multi)     Obesity     Sleep apnea     Type 2 diabetes mellitus      Past Surgical History:   Procedure Laterality Date    ADENOIDECTOMY      COLONOSCOPY      CORONARY ARTERY BYPASS GRAFT  2009    X 1 graft    TONSILLECTOMY       Family History   Problem Relation Name Age of Onset    Hypertension Mother      Stroke Mother          CVA    Diabetes Father      Hypertension Father      Heart disease Father      Cancer Father      Heart disease Father's Brother      Other (stents) Father's Brother      Other (defribrillator) Father's Brother       Social History     Tobacco Use    Smoking status: Every Day     Current packs/day: 0.50     Average packs/day: 0.5 packs/day for 39.0 years (19.5 ttl pk-yrs)     Types: Cigarettes     Start date: 1986    Smokeless tobacco: Never   Vaping Use    Vaping status: Never Used   Substance Use Topics    Alcohol use: Yes    Drug use: Never       Physical Exam   ED Triage Vitals [12/14/24 1129]   Temperature Heart Rate Respirations BP   36.3 °C (97.3 °F) 75  16 134/90      Pulse Ox Temp Source Heart Rate Source Patient Position   100 % Temporal -- Sitting      BP Location FiO2 (%)     -- --       Physical Exam  Vitals and nursing note reviewed.   Constitutional:       Appearance: She is well-developed.      Comments: Awake, sitting in examination chair   HENT:      Head: Normocephalic.      Comments: Small hematoma present on the left crown of patient's head     Nose: Nose normal.      Mouth/Throat:      Mouth: Mucous membranes are moist.      Pharynx: Oropharynx is clear.   Eyes:      Extraocular Movements: Extraocular movements intact.      Conjunctiva/sclera: Conjunctivae normal.      Pupils: Pupils are equal, round, and reactive to light.   Cardiovascular:      Rate and Rhythm: Normal rate and regular rhythm.      Pulses: Normal pulses.      Heart sounds: Normal heart sounds. No murmur heard.  Pulmonary:      Effort: Pulmonary effort is normal. No respiratory distress.      Breath sounds: Normal breath sounds.   Abdominal:      General: Abdomen is flat.      Palpations: Abdomen is soft.      Tenderness: There is no abdominal tenderness.   Musculoskeletal:         General: No swelling. Normal range of motion.      Cervical back: Normal range of motion and neck supple.   Skin:     General: Skin is warm and dry.      Capillary Refill: Capillary refill takes less than 2 seconds.   Neurological:      General: No focal deficit present.      Mental Status: She is alert and oriented to person, place, and time.   Psychiatric:         Mood and Affect: Mood normal.         Behavior: Behavior normal.           ED Course & MDM   Diagnoses as of 12/14/24 1802   Fall, initial encounter   Closed head injury, initial encounter                 No data recorded     Jerman Coma Scale Score: 15 (12/14/24 1130 : Jahaira Reese RN)                           Medical Decision Making  Patient is a 54-year-old female presenting via EMS after a fall.  CT head, CT C-spine ordered.   Conditions considered include but are not limited to: Contusion, fracture, intracranial hemorrhage.    CT head does show a left parietal hematoma without acute intracranial hemorrhage.  CT C-spine without acute findings.  I believe this patient is at low risk for complication, and a disposition of discharge is acceptable.  Return to the Emergency Department if new or worsening symptoms including headache, fever, chills, chest pain, shortness of breath, syncope, near syncope, abdominal pain, nausea, vomiting,  diarrhea, or worsening pain.  Patient is agreeable to a disposition of discharge and to follow with respective fields in the next several days.  Did discuss with her to reduce eyestrain as tolerated.  Recommend over-the-counter medications as needed for symptom control.    Portions of this note made with Dragon software, please be mindful of potential grammatical errors.        CT head wo IV contrast   Final Result   No acute intracranial pathologic findings are identified.   Large left parietal convexity scalp hematoma.        MACRO:   none        Signed by: Clayton Garcia 12/14/2024 1:02 PM   Dictation workstation:   [id number]      CT cervical spine wo IV contrast   Final Result   No evidence for a fracture on this exam. Cervical spondylosis as   described.        MACRO:   none        Signed by: Clayton Garcia 12/14/2024 1:06 PM   Dictation workstation:   [id number]            Procedure  Procedures     Thierno Santana PA-C  12/14/24 7301

## 2024-12-24 ENCOUNTER — HOSPITAL ENCOUNTER (OUTPATIENT)
Dept: RADIOLOGY | Facility: HOSPITAL | Age: 54
Discharge: HOME | End: 2024-12-24
Payer: COMMERCIAL

## 2024-12-24 ENCOUNTER — APPOINTMENT (OUTPATIENT)
Age: 54
End: 2024-12-24
Payer: COMMERCIAL

## 2024-12-24 VITALS
OXYGEN SATURATION: 95 % | HEART RATE: 77 BPM | DIASTOLIC BLOOD PRESSURE: 60 MMHG | SYSTOLIC BLOOD PRESSURE: 104 MMHG | WEIGHT: 256 LBS | TEMPERATURE: 98.6 F | BODY MASS INDEX: 38.8 KG/M2 | HEIGHT: 68 IN | RESPIRATION RATE: 18 BRPM

## 2024-12-24 DIAGNOSIS — M75.41 ROTATOR CUFF IMPINGEMENT SYNDROME OF RIGHT SHOULDER: ICD-10-CM

## 2024-12-24 DIAGNOSIS — M75.51 ACUTE BURSITIS OF RIGHT SHOULDER: ICD-10-CM

## 2024-12-24 DIAGNOSIS — I25.10 CORONARY ARTERY DISEASE INVOLVING NATIVE CORONARY ARTERY OF NATIVE HEART WITHOUT ANGINA PECTORIS: Primary | ICD-10-CM

## 2024-12-24 DIAGNOSIS — M25.511 RIGHT SHOULDER PAIN, UNSPECIFIED CHRONICITY: ICD-10-CM

## 2024-12-24 DIAGNOSIS — S43.491A OTHER SPRAIN OF RIGHT SHOULDER JOINT, INITIAL ENCOUNTER: ICD-10-CM

## 2024-12-24 PROCEDURE — 93000 ELECTROCARDIOGRAM COMPLETE: CPT | Performed by: INTERNAL MEDICINE

## 2024-12-24 PROCEDURE — 73221 MRI JOINT UPR EXTREM W/O DYE: CPT | Mod: RT

## 2024-12-24 PROCEDURE — 3048F LDL-C <100 MG/DL: CPT | Performed by: INTERNAL MEDICINE

## 2024-12-24 PROCEDURE — 3008F BODY MASS INDEX DOCD: CPT | Performed by: INTERNAL MEDICINE

## 2024-12-24 PROCEDURE — 3062F POS MACROALBUMINURIA REV: CPT | Performed by: INTERNAL MEDICINE

## 2024-12-24 PROCEDURE — 3078F DIAST BP <80 MM HG: CPT | Performed by: INTERNAL MEDICINE

## 2024-12-24 PROCEDURE — 4010F ACE/ARB THERAPY RXD/TAKEN: CPT | Performed by: INTERNAL MEDICINE

## 2024-12-24 PROCEDURE — 3044F HG A1C LEVEL LT 7.0%: CPT | Performed by: INTERNAL MEDICINE

## 2024-12-24 PROCEDURE — 73221 MRI JOINT UPR EXTREM W/O DYE: CPT | Mod: RIGHT SIDE | Performed by: RADIOLOGY

## 2024-12-24 PROCEDURE — 99204 OFFICE O/P NEW MOD 45 MIN: CPT | Performed by: INTERNAL MEDICINE

## 2024-12-24 PROCEDURE — 3074F SYST BP LT 130 MM HG: CPT | Performed by: INTERNAL MEDICINE

## 2024-12-24 ASSESSMENT — ENCOUNTER SYMPTOMS
OCCASIONAL FEELINGS OF UNSTEADINESS: 0
DEPRESSION: 0
LOSS OF SENSATION IN FEET: 0

## 2024-12-24 ASSESSMENT — LIFESTYLE VARIABLES
HAVE YOU OR SOMEONE ELSE BEEN INJURED AS A RESULT OF YOUR DRINKING: NO
AUDIT-C TOTAL SCORE: 4
SKIP TO QUESTIONS 9-10: 1
HOW MANY STANDARD DRINKS CONTAINING ALCOHOL DO YOU HAVE ON A TYPICAL DAY: 1 OR 2
HOW OFTEN DO YOU HAVE SIX OR MORE DRINKS ON ONE OCCASION: NEVER
HAS A RELATIVE, FRIEND, DOCTOR, OR ANOTHER HEALTH PROFESSIONAL EXPRESSED CONCERN ABOUT YOUR DRINKING OR SUGGESTED YOU CUT DOWN: NO
HOW OFTEN DO YOU HAVE A DRINK CONTAINING ALCOHOL: 4 OR MORE TIMES A WEEK
AUDIT TOTAL SCORE: 4

## 2024-12-24 ASSESSMENT — PAIN SCALES - GENERAL: PAINLEVEL_OUTOF10: 0-NO PAIN

## 2024-12-24 NOTE — ASSESSMENT & PLAN NOTE
History of CABG in 2013 with LIMA to the LAD, no significant obstruction to right coronary or circumflex

## 2024-12-24 NOTE — PROGRESS NOTES
Subjective      Chief Complaint   Patient presents with    [facility]     Mrs\Ms. Romero is present to establish relationship with Dr. Baugh for Cardiac Eval and Treat after referral from Dr. Bejarano          Referred by her primary care physician for cardiac evaluation.  She is angina free with no signs of heart failure.  She has no history of previous myocardial infarction, heart failure, valvular heart disease, syncope or sustained arrhythmias.         Review of Systems   All other systems reviewed and are negative.       Objective   Physical Exam  Constitutional:       Appearance: Normal appearance.   HENT:      Head: Normocephalic and atraumatic.   Eyes:      Pupils: Pupils are equal, round, and reactive to light.   Cardiovascular:      Rate and Rhythm: Normal rate and regular rhythm.      Pulses: Normal pulses.      Heart sounds: Normal heart sounds.   Pulmonary:      Effort: Pulmonary effort is normal.      Breath sounds: Normal breath sounds.   Abdominal:      General: Abdomen is flat. Bowel sounds are normal.      Palpations: Abdomen is soft.   Musculoskeletal:         General: Normal range of motion.      Cervical back: Normal range of motion.      Right lower leg: Edema present.      Left lower leg: Edema present.      Comments: Mild bilateral pretibial edema in the setting of moderate dose verapamil   Skin:     General: Skin is warm and dry.   Neurological:      General: No focal deficit present.   Psychiatric:         Mood and Affect: Mood normal.         Judgment: Judgment normal.          Lab Review:   Not applicable    Coronary artery disease  History of CABG in 2013 with LIMA to the LAD, no significant obstruction to right coronary or circumflex    Benign essential hypertension  Echocardiogram 4/11/2022 preserved LV systolic function grade.  Blood pressure is well-controlled

## 2025-01-07 ENCOUNTER — OFFICE VISIT (OUTPATIENT)
Dept: ORTHOPEDIC SURGERY | Facility: CLINIC | Age: 55
End: 2025-01-07
Payer: COMMERCIAL

## 2025-01-07 DIAGNOSIS — M75.41 ROTATOR CUFF IMPINGEMENT SYNDROME OF RIGHT SHOULDER: ICD-10-CM

## 2025-01-07 DIAGNOSIS — M25.511 CHRONIC PAIN OF BOTH SHOULDERS: ICD-10-CM

## 2025-01-07 DIAGNOSIS — M25.511 RIGHT SHOULDER PAIN, UNSPECIFIED CHRONICITY: ICD-10-CM

## 2025-01-07 DIAGNOSIS — G89.29 CHRONIC PAIN OF BOTH SHOULDERS: ICD-10-CM

## 2025-01-07 DIAGNOSIS — M25.512 CHRONIC PAIN OF BOTH SHOULDERS: ICD-10-CM

## 2025-01-07 DIAGNOSIS — M75.51 ACUTE BURSITIS OF RIGHT SHOULDER: ICD-10-CM

## 2025-01-07 DIAGNOSIS — S43.491A OTHER SPRAIN OF RIGHT SHOULDER JOINT, INITIAL ENCOUNTER: ICD-10-CM

## 2025-01-15 DIAGNOSIS — F41.9 ANXIETY: ICD-10-CM

## 2025-01-15 DIAGNOSIS — I10 BENIGN ESSENTIAL HYPERTENSION: ICD-10-CM

## 2025-01-15 RX ORDER — ESCITALOPRAM OXALATE 20 MG/1
20 TABLET ORAL NIGHTLY
Qty: 90 TABLET | Refills: 1 | Status: SHIPPED | OUTPATIENT
Start: 2025-01-15 | End: 2025-07-14

## 2025-01-15 RX ORDER — HYDROCHLOROTHIAZIDE 25 MG/1
120 TABLET ORAL NIGHTLY
Qty: 90 TABLET | Refills: 1 | Status: SHIPPED | OUTPATIENT
Start: 2025-01-15 | End: 2025-07-14

## 2025-01-15 RX ORDER — POTASSIUM CHLORIDE 750 MG/1
20 TABLET, FILM COATED, EXTENDED RELEASE ORAL DAILY
Qty: 180 TABLET | Refills: 1 | Status: SHIPPED | OUTPATIENT
Start: 2025-01-15 | End: 2025-07-14

## 2025-01-29 DIAGNOSIS — E11.9 TYPE 2 DIABETES MELLITUS WITHOUT COMPLICATION, WITHOUT LONG-TERM CURRENT USE OF INSULIN (MULTI): Primary | ICD-10-CM

## 2025-01-29 DIAGNOSIS — E78.2 MIXED HYPERLIPIDEMIA: ICD-10-CM

## 2025-01-29 RX ORDER — PRAVASTATIN SODIUM 80 MG/1
80 TABLET ORAL DAILY
Qty: 90 TABLET | Refills: 3 | Status: SHIPPED | OUTPATIENT
Start: 2025-01-29

## 2025-01-29 RX ORDER — BLOOD SUGAR DIAGNOSTIC
200 STRIP MISCELLANEOUS 2 TIMES DAILY
COMMUNITY
End: 2025-01-29 | Stop reason: SDUPTHER

## 2025-01-29 RX ORDER — BLOOD SUGAR DIAGNOSTIC
200 STRIP MISCELLANEOUS 2 TIMES DAILY
Qty: 200 EACH | Refills: 3 | Status: SHIPPED | OUTPATIENT
Start: 2025-01-29

## 2025-02-04 ENCOUNTER — OFFICE VISIT (OUTPATIENT)
Dept: ORTHOPEDIC SURGERY | Facility: CLINIC | Age: 55
End: 2025-02-04
Payer: COMMERCIAL

## 2025-02-04 VITALS — WEIGHT: 256 LBS | HEIGHT: 68 IN | BODY MASS INDEX: 38.8 KG/M2

## 2025-02-04 DIAGNOSIS — M75.41 ROTATOR CUFF IMPINGEMENT SYNDROME OF RIGHT SHOULDER: ICD-10-CM

## 2025-02-04 DIAGNOSIS — S43.491A OTHER SPRAIN OF RIGHT SHOULDER JOINT, INITIAL ENCOUNTER: ICD-10-CM

## 2025-02-04 DIAGNOSIS — S43.402A SPRAIN OF LEFT SHOULDER, UNSPECIFIED SHOULDER SPRAIN TYPE, INITIAL ENCOUNTER: ICD-10-CM

## 2025-02-04 DIAGNOSIS — M25.512 LEFT SHOULDER PAIN, UNSPECIFIED CHRONICITY: ICD-10-CM

## 2025-02-04 DIAGNOSIS — M19.011 LOCALIZED OSTEOARTHRITIS OF RIGHT SHOULDER: ICD-10-CM

## 2025-02-04 DIAGNOSIS — M25.511 RIGHT SHOULDER PAIN, UNSPECIFIED CHRONICITY: Primary | ICD-10-CM

## 2025-02-04 PROCEDURE — 99213 OFFICE O/P EST LOW 20 MIN: CPT | Performed by: ORTHOPAEDIC SURGERY

## 2025-02-04 PROCEDURE — 4010F ACE/ARB THERAPY RXD/TAKEN: CPT | Performed by: ORTHOPAEDIC SURGERY

## 2025-02-04 PROCEDURE — 3008F BODY MASS INDEX DOCD: CPT | Performed by: ORTHOPAEDIC SURGERY

## 2025-02-04 ASSESSMENT — LIFESTYLE VARIABLES
HOW MANY STANDARD DRINKS CONTAINING ALCOHOL DO YOU HAVE ON A TYPICAL DAY: PATIENT DOES NOT DRINK
HOW OFTEN DO YOU HAVE SIX OR MORE DRINKS ON ONE OCCASION: NEVER
AUDIT-C TOTAL SCORE: 0
SKIP TO QUESTIONS 9-10: 1
HOW OFTEN DO YOU HAVE A DRINK CONTAINING ALCOHOL: NEVER

## 2025-02-04 ASSESSMENT — ENCOUNTER SYMPTOMS
LOSS OF SENSATION IN FEET: 0
DEPRESSION: 0
OCCASIONAL FEELINGS OF UNSTEADINESS: 0

## 2025-02-04 ASSESSMENT — PAIN SCALES - GENERAL
PAINLEVEL_OUTOF10: 6
PAINLEVEL_OUTOF10: 6

## 2025-02-04 ASSESSMENT — PAIN - FUNCTIONAL ASSESSMENT: PAIN_FUNCTIONAL_ASSESSMENT: 0-10

## 2025-02-04 NOTE — PATIENT INSTRUCTIONS
Thank you for coming to see us today!     Continue to rest, ice, and elevate  Tylenol for pain control  We are giving you a referral for Physical Therapy     Follow up with us in 2 months

## 2025-02-18 ENCOUNTER — EVALUATION (OUTPATIENT)
Dept: PHYSICAL THERAPY | Facility: CLINIC | Age: 55
End: 2025-02-18
Payer: COMMERCIAL

## 2025-02-18 ENCOUNTER — OFFICE VISIT (OUTPATIENT)
Dept: PRIMARY CARE | Facility: CLINIC | Age: 55
End: 2025-02-18
Payer: COMMERCIAL

## 2025-02-18 VITALS
DIASTOLIC BLOOD PRESSURE: 80 MMHG | TEMPERATURE: 96.8 F | WEIGHT: 252 LBS | BODY MASS INDEX: 38.19 KG/M2 | HEIGHT: 68 IN | OXYGEN SATURATION: 95 % | SYSTOLIC BLOOD PRESSURE: 124 MMHG | HEART RATE: 81 BPM

## 2025-02-18 DIAGNOSIS — I10 BENIGN ESSENTIAL HYPERTENSION: ICD-10-CM

## 2025-02-18 DIAGNOSIS — E66.812 CLASS 2 DRUG-INDUCED OBESITY WITH SERIOUS COMORBIDITY AND BODY MASS INDEX (BMI) OF 38.0 TO 38.9 IN ADULT: ICD-10-CM

## 2025-02-18 DIAGNOSIS — M75.41 ROTATOR CUFF IMPINGEMENT SYNDROME OF RIGHT SHOULDER: ICD-10-CM

## 2025-02-18 DIAGNOSIS — E11.9 TYPE 2 DIABETES MELLITUS WITHOUT COMPLICATION, WITHOUT LONG-TERM CURRENT USE OF INSULIN (MULTI): Primary | ICD-10-CM

## 2025-02-18 DIAGNOSIS — E66.01 SEVERE OBESITY (BMI >= 40) (MULTI): ICD-10-CM

## 2025-02-18 DIAGNOSIS — S43.491A OTHER SPRAIN OF RIGHT SHOULDER JOINT, INITIAL ENCOUNTER: ICD-10-CM

## 2025-02-18 DIAGNOSIS — S43.402A SPRAIN OF LEFT SHOULDER, UNSPECIFIED SHOULDER SPRAIN TYPE, INITIAL ENCOUNTER: ICD-10-CM

## 2025-02-18 DIAGNOSIS — E66.1 CLASS 2 DRUG-INDUCED OBESITY WITH SERIOUS COMORBIDITY AND BODY MASS INDEX (BMI) OF 38.0 TO 38.9 IN ADULT: ICD-10-CM

## 2025-02-18 DIAGNOSIS — Z12.31 SCREENING MAMMOGRAM FOR BREAST CANCER: ICD-10-CM

## 2025-02-18 DIAGNOSIS — E78.5 DYSLIPIDEMIA: ICD-10-CM

## 2025-02-18 DIAGNOSIS — M25.511 RIGHT SHOULDER PAIN, UNSPECIFIED CHRONICITY: Primary | ICD-10-CM

## 2025-02-18 DIAGNOSIS — M25.512 LEFT SHOULDER PAIN, UNSPECIFIED CHRONICITY: ICD-10-CM

## 2025-02-18 DIAGNOSIS — M19.011 LOCALIZED OSTEOARTHRITIS OF RIGHT SHOULDER: ICD-10-CM

## 2025-02-18 LAB — POC HEMOGLOBIN A1C: 5.4 % (ref 4.2–6.5)

## 2025-02-18 PROCEDURE — 99214 OFFICE O/P EST MOD 30 MIN: CPT | Performed by: INTERNAL MEDICINE

## 2025-02-18 PROCEDURE — 97161 PT EVAL LOW COMPLEX 20 MIN: CPT | Mod: GP

## 2025-02-18 PROCEDURE — 3074F SYST BP LT 130 MM HG: CPT | Performed by: INTERNAL MEDICINE

## 2025-02-18 PROCEDURE — 4010F ACE/ARB THERAPY RXD/TAKEN: CPT | Performed by: INTERNAL MEDICINE

## 2025-02-18 PROCEDURE — 3008F BODY MASS INDEX DOCD: CPT | Performed by: INTERNAL MEDICINE

## 2025-02-18 PROCEDURE — 83036 HEMOGLOBIN GLYCOSYLATED A1C: CPT | Performed by: INTERNAL MEDICINE

## 2025-02-18 PROCEDURE — 4004F PT TOBACCO SCREEN RCVD TLK: CPT | Performed by: INTERNAL MEDICINE

## 2025-02-18 PROCEDURE — 3079F DIAST BP 80-89 MM HG: CPT | Performed by: INTERNAL MEDICINE

## 2025-02-18 RX ORDER — DULAGLUTIDE 4.5 MG/.5ML
4.5 INJECTION, SOLUTION SUBCUTANEOUS WEEKLY
Qty: 2 ML | Refills: 5 | Status: SHIPPED | OUTPATIENT
Start: 2025-02-18

## 2025-02-18 ASSESSMENT — ENCOUNTER SYMPTOMS
COUGH: 0
DYSURIA: 0
LOSS OF SENSATION IN FEET: 0
ARTHRALGIAS: 1
CHILLS: 0
DEPRESSION: 0
VOMITING: 0
NAUSEA: 0
LOSS OF SENSATION IN FEET: 1
NUMBNESS: 0
PAIN LOCATION: B SHOULDERS
OCCASIONAL FEELINGS OF UNSTEADINESS: 0
OCCASIONAL FEELINGS OF UNSTEADINESS: 0
PAIN SCALE: 6
SEIZURES: 0
MYALGIAS: 0
FREQUENCY: 0
WHEEZING: 0
BLOOD IN STOOL: 0
BACK PAIN: 1
TROUBLE SWALLOWING: 0
TREMORS: 0
ABDOMINAL PAIN: 0
POLYPHAGIA: 0
FATIGUE: 0
POLYDIPSIA: 0
PALPITATIONS: 0
SINUS PRESSURE: 0
SHORTNESS OF BREATH: 0
DEPRESSION: 1

## 2025-02-18 ASSESSMENT — PAIN SCALES - GENERAL: PAINLEVEL_OUTOF10: 6

## 2025-02-18 NOTE — PROGRESS NOTES
Subjective   Patient ID: Camille Romero is a 54 y.o. female who presents for 4 month f/u.    HPI     Has history of hypertension, hyperlipidemia, type 2 diabetes mellitus, depression, coronary artery disease, migraine headaches.         History of coronary artery disease status post CABG in 2013-follows with cardiologist Dr Bejarano. Was advised to follow up as needed.         History of diabetes mellitus-Denied any hypoglycemic episodes. Has free style Melissa.  Ozempic was not covered,            History of hyperlipidemia-compliant with medications.         H/O Migraine headaches- Taking Amitriptyline for migraine headaches.         Takes Gabapentin for neuropathy and migraine headaches.         H/O Tobacco dependence- smokes pack every 2-3 days. Started smoking when she was 15 years old.         Seeing Dr Has for left knee pain-    Seeing Dr Mckay for low back pain,     . Polycythemia: Seeing Hematology  Has been felt to be multifactorial previous dehydration, current tobacco use and hx sleep apnea. Patient has been worked up for sleep apnea. Not on CPAP.   Iron studies completed and found to be within normal limits thus hemochromatosis is not a concern.  JAK2 mutation negative      Left thumb trigger finger, seeing pain management    Complaining of bilateral shoulder plain, worse on left side compared to right  Range of motion reduced    Review of Systems   Constitutional:  Negative for chills and fatigue.   HENT:  Negative for postnasal drip, sinus pressure and trouble swallowing.    Respiratory:  Negative for cough, shortness of breath and wheezing.    Cardiovascular:  Negative for chest pain, palpitations and leg swelling.   Gastrointestinal:  Negative for abdominal pain, blood in stool, nausea and vomiting.   Endocrine: Negative for polydipsia, polyphagia and polyuria.   Genitourinary:  Negative for dysuria and frequency.   Musculoskeletal:  Positive for arthralgias and back pain. Negative for  "myalgias.   Skin:  Negative for rash.   Neurological:  Negative for tremors, seizures and numbness.   Psychiatric/Behavioral:  Negative for behavioral problems.        Objective   /80 (BP Location: Left arm, Patient Position: Sitting, BP Cuff Size: Large adult)   Pulse 81   Temp 36 °C (96.8 °F) (Temporal)   Ht 1.715 m (5' 7.5\")   Wt 114 kg (252 lb)   SpO2 95%   BMI 38.89 kg/m²     Physical Exam  Constitutional:       General: She is not in acute distress.     Appearance: Normal appearance.   HENT:      Head: Normocephalic and atraumatic.   Eyes:      Extraocular Movements: Extraocular movements intact.      Pupils: Pupils are equal, round, and reactive to light.   Cardiovascular:      Rate and Rhythm: Normal rate and regular rhythm.      Heart sounds: Normal heart sounds. No murmur heard.     No friction rub.   Pulmonary:      Effort: Pulmonary effort is normal.      Breath sounds: Normal breath sounds. No wheezing or rales.   Abdominal:      General: Bowel sounds are normal.      Palpations: Abdomen is soft.      Tenderness: There is no abdominal tenderness. There is no guarding.   Musculoskeletal:         General: Normal range of motion.      Right lower leg: No edema.      Left lower leg: No edema.      Comments: Uses cane to walk   Neurological:      Mental Status: She is alert and oriented to person, place, and time.      Cranial Nerves: No cranial nerve deficit.   Psychiatric:         Mood and Affect: Mood normal.         Assessment/Plan       Camille was seen today for 4 month f/u.  Diagnoses and all orders for this visit:  Type 2 diabetes mellitus without complication, without long-term current use of insulin (Multi) (Primary)  -     POCT glycosylated hemoglobin (Hb A1C) manually resulted  -     dulaglutide (Trulicity) 4.5 mg/0.5 mL pen injector; Inject 4.5 mg under the skin 1 (one) time per week.  Screening mammogram for breast cancer  -     BI mammo bilateral screening tomosynthesis; " "Future  Benign essential hypertension  Dyslipidemia  Severe obesity (BMI >= 40) (Multi)  Class 2 drug-induced obesity with serious comorbidity and body mass index (BMI) of 38.0 to 38.9 in adult       Reviewed home blood sugar readings, stated on her freestyle saba showing multiple blood sugar readings in 70s and when she checks with her glucometer blood sugars are ranging in   No symptoms of hypoglycemia   advised to continue with low-carb diet, limit daily calories to less than 1800 a day denied any hypoglycemic episodes  Advise no need to increase Trulicity, however patient is interested higher dose to see if she will lose more weight    Lab Results   Component Value Date    HGBA1C 5.4 02/18/2025      Lab Results   Component Value Date    WBC 9.1 07/09/2024    HGB 15.7 07/09/2024    HCT 47.6 (H) 07/09/2024    MCV 93 07/09/2024     07/09/2024     Lab Results   Component Value Date    GLUCOSE 142 (H) 07/09/2024    CALCIUM 9.1 07/09/2024     07/09/2024    K 4.3 07/09/2024    CO2 23 (L) 07/09/2024     07/09/2024    BUN 15 07/09/2024    CREATININE 0.90 07/09/2024     Lab Results   Component Value Date    CHOL 116 (L) 07/09/2024    CHOL 113 (L) 02/18/2023    CHOL 110 (L) 02/01/2022     Lab Results   Component Value Date    HDL 30.0 (L) 07/09/2024    HDL 25 (L) 02/18/2023    HDL 23 (L) 02/01/2022     Lab Results   Component Value Date    LDLCALC 56 (L) 07/09/2024    LDLCALC 64 (L) 02/18/2023    LDLCALC 59 (L) 02/01/2022     Lab Results   Component Value Date    TRIG 149 07/09/2024    TRIG 118 02/18/2023    TRIG 138 02/01/2022     No components found for: \"CHOLHDL\"      Current Outpatient Medications   Medication Instructions    albuterol (Proventil HFA) 90 mcg/actuation inhaler 2 puffs, Every 4 hours PRN    amitriptyline (ELAVIL) 50 mg, oral, Nightly    blood-glucose meter misc Twice daily    butalbital-acetaminophen-caff -40 mg tablet TAKE 1 TO 2 TABLET BY MOUTH EVERY 4 HOURS AS NEEDED F "    carvedilol (COREG) 12.5 mg, oral, 2 times daily, With food    cyanocobalamin, vitamin B-12, (VITAMIN B-12 ORAL) 1000 MCG  as directed Orally    docusate sodium (COLACE) 100 mg, oral, 2 times daily PRN    ergocalciferol (Vitamin D-2) 1.25 MG (87614 UT) capsule 1 capsule, Once Weekly    escitalopram (LEXAPRO) 20 mg, oral, Nightly    ezetimibe (ZETIA) 10 mg, oral, Daily    FreeStyle Lancets 28 gauge USE AS DIRECTED EVERY 12 HOURS    FreeStyle Melissa 3 Sensor device 1 each, miscellaneous, Continuous    Gralise 600 mg tablet extended release 24 hr 2 tablets, Nightly    hydroCHLOROthiazide (MICROZIDE) 12.5 mg, oral, Every morning    ibuprofen 800 mg, oral, Every 6 hours PRN    OneTouch Ultra Test strip 200 each, subcutaneous, 2 times daily    potassium chloride CR 10 mEq ER tablet 20 mEq, oral, Daily, Do not crush, chew, or split.    pravastatin (PRAVACHOL) 80 mg, oral, Daily    topiramate (TOPAMAX) 200 mg, Nightly    traMADol (Ultram) 50 mg tablet 1 tablet, 3 times daily (0900,1400,1900)    Trulicity 4.5 mg, subcutaneous, Weekly    valsartan (DIOVAN) 80 mg, oral, Daily    verapamil SR (CALAN-SR) 120 mg, oral, Nightly

## 2025-02-18 NOTE — PROGRESS NOTES
Physical Therapy Evaluation and Treatment     Patient Name: Camille Romero  MRN: [id number]  Encounter date: 2/18/2025  Time Calculation  Start Time: 1034  Stop Time: 1115  Time Calculation (min): 41 min  PT Evaluation Time Entry  PT Evaluation (Low) Time Entry: 41    Visit # 1 of 13  Visits/Dates Authorized: 2025: [employer] - NO AUTH / 100% COVERAGE / 30V/yr - Check w/pt, 0 used per Epic / ds 2/17/25.   CPT 30872, 16299 - covered - No auth until auth is required for therapy //     Current Problem:   Problem List Items Addressed This Visit             ICD-10-CM       Musculoskeletal and Injuries    Right shoulder pain - Primary M25.511    Relevant Orders    Follow Up In Physical Therapy    Sprain of shoulder, left S43.402A    Relevant Orders    Follow Up In Physical Therapy    Rotator cuff impingement syndrome of right shoulder M75.41    Relevant Orders    Follow Up In Physical Therapy    Localized osteoarthritis of right shoulder M19.011    Relevant Orders    Follow Up In Physical Therapy    Left shoulder pain M25.512    Relevant Orders    Follow Up In Physical Therapy       Other    Other sprain of right shoulder joint, initial encounter S43.491A    Relevant Orders    Follow Up In Physical Therapy     Precautions: type 2 diabetic, potential dizziness/fainting per         MRI R shoulder 12/10/24: 1. Moderate periarticular marrow edema about the acromioclavicular  joint in the distal clavicle and corresponding acromion process with  findings superimposed on osteoarthritic degenerative change.  Component of osteolysis about the articulation is not excluded. There  is mild surrounding pericapsular edema.      2. Mild supraspinatus myotendinous edema with straining with edema  along the anterior leading edge of the supraspinatus in the rotator  interval.      3. No focal full-thickness rotator cuff tear.    CT cervical 12/14/24: No fractures or destructive lesions are identified. There is marked  disc space  "narrowing with anterior and posterior marginal spurring at  C5-6 and C6-7. There is also anterior and posterior marginal spurring  at C3-4 and C4-5. There is facet spurring on the right with sclerosis  at C4-5 and minimal facet spurring on the left at C3-4.      There is foraminal stenosis on the right at C4-5 from facet spurring  and uncovertebral spurring. There is foraminal stenosis bilaterally  at C5-6 and C6-7 from uncovertebral spurring.      Thyroid calcifications are present within the right lobe. Calcific  plaque is present at the carotid bifurcations bilaterally.    X-ray L shoulder 10/14/24: 1. Unremarkable left shoulder radiographs.      PMHX: MI, CABG   Subjective Evaluation    History of Present Illness  Onset date: 5 months ago.  Mechanism of injury: Pt reported that she fell in December due to her dog. Her dog spun her around and she fell. Pt fell on her L side and hit her head. She did have a concussion, but denied issues currently. Pt thinks she loss consciousness  for a few seconds. She feels her R and L shoulder \"push up\" when she pushes up from. Lifting hurts both arms, but pain is worse on her R shoulder. Pt has had pain in both shoulders for 5 months, but denied the fall making symptoms worse. She denied any FLORIAN 5 months ago. Pt does have issues with neuropathy in both hands. Does have lumbar issues and is using a single crutch to get around due to her back. Denied neck pain, but believes she needs to get \"adjusted\" by a chiropractor . Does note dizziness, mainly in the morning. Believes it may be related to BP    Pain  Current pain ratin  Location: B shoulders    Social Support  Lives in: multiple-level home (2 story with basement)  Lives with: spouse    Hand dominance: left    Patient Goals  Patient goals for therapy: decreased pain  Patient goal: To be able to move them without pain, be able to hold my 9 month old grand baby without numbness or pain            Objective "     Neurological Testing     Reflexes   Left   Biceps (C5/C6): normal (2+)  Brachioradialis (C6): normal (2+)  Triceps (C7): absent (0)    Right   Biceps (C5/C6): normal (2+)  Brachioradialis (C6): normal (2+)  Triceps (C7): trace (1+)    Active Range of Motion   Cervical/Thoracic Spine   Cervical    Flexion: WFL  Extension: Neck active extension: 75% with pain  Left lateral flexion: Neck active lateral bend left: 50%   Right lateral flexion: Neck active lateral bend right: 50%   Left rotation: Neck active rotation left: 75%; pull.   Right rotation: Neck active rotation right: 50%   Left Shoulder   Flexion: 125 degrees   Abduction: 125 degrees   External rotation 0°: 65 degrees   Internal rotation BTB: T11     Right Shoulder   Flexion: 132 degrees   Abduction: 122 degrees   External rotation 0°: 65 degrees   Internal rotation BTB: L4     Strength/Myotome Testing     Left Shoulder     Planes of Motion   Flexion: 3+   Abduction: 3+   External rotation at 0°: 4-   Internal rotation at 0°: 4-     Right Shoulder     Planes of Motion   Flexion: 3+   Abduction: 3+   External rotation at 0°: 4-   Internal rotation at 0°: 4-               Objective     Neurological Testing     Reflexes   Left   Biceps (C5/C6): normal (2+)  Brachioradialis (C6): normal (2+)  Triceps (C7): absent (0)    Right   Biceps (C5/C6): normal (2+)  Brachioradialis (C6): normal (2+)  Triceps (C7): trace (1+)    Active Range of Motion   Cervical/Thoracic Spine   Cervical    Flexion: WFL  Extension: Neck active extension: 75% with pain  Left lateral flexion: Neck active lateral bend left: 50%   Right lateral flexion: Neck active lateral bend right: 50%   Left rotation: Neck active rotation left: 75%; pull.   Right rotation: Neck active rotation right: 50%   Left Shoulder   Flexion: 125 degrees   Abduction: 125 degrees   External rotation 0°: 65 degrees   Internal rotation BTB: T11     Right Shoulder   Flexion: 132 degrees   Abduction: 122 degrees    External rotation 0°: 65 degrees   Internal rotation BTB: L4     Strength/Myotome Testing     Left Shoulder     Planes of Motion   Flexion: 3+   Abduction: 3+   External rotation at 0°: 4-   Internal rotation at 0°: 4-     Right Shoulder     Planes of Motion   Flexion: 3+   Abduction: 3+   External rotation at 0°: 4-   Internal rotation at 0°: 4-        Objective     Neurological Testing     Reflexes   Left   Biceps (C5/C6): normal (2+)  Brachioradialis (C6): normal (2+)  Triceps (C7): absent (0)    Right   Biceps (C5/C6): normal (2+)  Brachioradialis (C6): normal (2+)  Triceps (C7): trace (1+)    Active Range of Motion   Cervical/Thoracic Spine   Cervical    Flexion: WFL  Extension: Neck active extension: 75% with pain  Left lateral flexion: Neck active lateral bend left: 50%   Right lateral flexion: Neck active lateral bend right: 50%   Left rotation: Neck active rotation left: 75%; pull.   Right rotation: Neck active rotation right: 50%   Left Shoulder   Flexion: 125 degrees   Abduction: 125 degrees   External rotation 0°: 65 degrees   Internal rotation BTB: T11     Right Shoulder   Flexion: 132 degrees   Abduction: 122 degrees   External rotation 0°: 65 degrees   Internal rotation BTB: L4     Strength/Myotome Testing     Left Shoulder     Planes of Motion   Flexion: 3+   Abduction: 3+   External rotation at 0°: 4-   Internal rotation at 0°: 4-     Right Shoulder     Planes of Motion   Flexion: 3+   Abduction: 3+   External rotation at 0°: 4-   Internal rotation at 0°: 4-               Other Measures  Other Outcome Measures: UEFI: 26/80    Treatments:         HEP / Access Codes:   Access Code: [email or web address]  URL: [email or web address]  Date: 02/18/2025  Prepared by: Wilber Roland    Exercises  - Bilateral Shoulder Flexion Wall Slide with Towel  - 1-3 x daily - 7 x weekly - 1-2 sets - 10 reps  - Standing Shoulder Abduction Slides at Wall  - 1-3 x daily - 7 x weekly - 1-2 sets - 10 reps    Assessment &  Plan     Assessment  Impairments: abnormal or restricted ROM, impaired physical strength and pain with function  Assessment details: Pt demonstrated weakness and limited AROM in B shoulders. Progress B shoulder AROM and strengthening as able   Prognosis: fair    Goals  To be able to move them without pain, be able to hold my 9 month old grand baby without numbness or pain    Plan  Therapy options: will be seen for skilled physical therapy services  Planned modality interventions: cryotherapy and thermotherapy (hydrocollator packs)  Planned therapy interventions: body mechanics training, flexibility, functional ROM exercises, home exercise program, therapeutic activities, stretching, strengthening, neuromuscular re-education and manual therapy  Frequency: 2x week  Duration in visits: 12  Duration in weeks: 6  Treatment plan discussed with: patient  Plan details: Pt educated on HEP-given handout, POC, assessment        Low complexity due to patient's clinical presentation being stable and uncomplicated by any significant comorbidities that may affect rehab tolerance and progression.     Post-Treatment Symptoms:   8/10    Goals:   Active       PT Problem       PT Goal 1       Start:  02/18/25    Expected End:  03/11/25       Pt will be independent with HEP to ensure benefit outside of PT    Pt will report a 25% or more reduction in pain to allow pt to be able to perform ADLs, hold grandchild          PT Goal 2       Start:  02/18/25    Expected End:  04/01/25       Pt will report a 75% or more reduction in pain  to allow pt to be able to perform ADLs, hold grandchild     Pt will improve strength of B shoulders to at least 4/5  to allow pt to be able to perform ADLs, hold grandchild     Pt will improve AROM of B shoulders to WFL  to allow pt to be able to perform ADLs, hold grandchild     Pt will improve score on UEFI by at least 20 points  to allow pt to be able to perform ADLs, hold grandchild     Pt will be able to  hold grandchild with minimal to no difficulty

## 2025-02-19 ENCOUNTER — APPOINTMENT (OUTPATIENT)
Dept: PRIMARY CARE | Facility: CLINIC | Age: 55
End: 2025-02-19
Payer: COMMERCIAL

## 2025-02-26 DIAGNOSIS — E78.5 DYSLIPIDEMIA: ICD-10-CM

## 2025-02-26 RX ORDER — EZETIMIBE 10 MG/1
10 TABLET ORAL DAILY
Qty: 90 TABLET | Refills: 3 | Status: SHIPPED | OUTPATIENT
Start: 2025-02-26

## 2025-03-11 ENCOUNTER — APPOINTMENT (OUTPATIENT)
Dept: PHYSICAL THERAPY | Facility: CLINIC | Age: 55
End: 2025-03-11
Payer: COMMERCIAL

## 2025-03-18 ENCOUNTER — TREATMENT (OUTPATIENT)
Dept: PHYSICAL THERAPY | Facility: CLINIC | Age: 55
End: 2025-03-18
Payer: COMMERCIAL

## 2025-03-18 DIAGNOSIS — M25.512 LEFT SHOULDER PAIN, UNSPECIFIED CHRONICITY: ICD-10-CM

## 2025-03-18 DIAGNOSIS — M75.41 ROTATOR CUFF IMPINGEMENT SYNDROME OF RIGHT SHOULDER: ICD-10-CM

## 2025-03-18 DIAGNOSIS — S43.491A OTHER SPRAIN OF RIGHT SHOULDER JOINT, INITIAL ENCOUNTER: ICD-10-CM

## 2025-03-18 DIAGNOSIS — S43.402A SPRAIN OF LEFT SHOULDER, UNSPECIFIED SHOULDER SPRAIN TYPE, INITIAL ENCOUNTER: ICD-10-CM

## 2025-03-18 DIAGNOSIS — M25.511 RIGHT SHOULDER PAIN, UNSPECIFIED CHRONICITY: ICD-10-CM

## 2025-03-18 DIAGNOSIS — M19.011 LOCALIZED OSTEOARTHRITIS OF RIGHT SHOULDER: ICD-10-CM

## 2025-03-18 PROCEDURE — 97140 MANUAL THERAPY 1/> REGIONS: CPT | Mod: GP,CQ

## 2025-03-18 PROCEDURE — 97110 THERAPEUTIC EXERCISES: CPT | Mod: GP,CQ

## 2025-03-18 ASSESSMENT — PAIN DESCRIPTION - DESCRIPTORS: DESCRIPTORS: DULL;DISCOMFORT

## 2025-03-18 ASSESSMENT — PAIN SCALES - GENERAL: PAINLEVEL_OUTOF10: 3

## 2025-03-18 NOTE — PROGRESS NOTES
Physical Therapy Treatment    Patient Name: Camille Romero  MRN: [id number]  Today's Date: 3/18/2025  Time Calculation  Start Time: 0845  Stop Time: 0930  Time Calculation (min): 45 min  PT Therapeutic Procedures Time Entry  Manual Therapy Time Entry: 12  Therapeutic Exercise Time Entry: 31    Insurance:  Visit number: 2 of 13  Authorization info: 2025: [employer] - NO AUTH / 100% COVERAGE / 30V/yr - Check w/pt, 0 used per Epic / ds 2/17/25.   CPT 75594, 09384 - covered - No auth until auth is required for therapy //    Insurance Type: Payor: [facility] / Plan: [employer] / Product Type: *No Product type* /     Current Problem   1. Right shoulder pain, unspecified chronicity  Follow Up In Physical Therapy      2. Rotator cuff impingement syndrome of right shoulder  Follow Up In Physical Therapy      3. Other sprain of right shoulder joint, initial encounter  Follow Up In Physical Therapy      4. Localized osteoarthritis of right shoulder  Follow Up In Physical Therapy      5. Sprain of left shoulder, unspecified shoulder sprain type, initial encounter  Follow Up In Physical Therapy      6. Left shoulder pain, unspecified chronicity  Follow Up In Physical Therapy          Subjective   General   Reason for Referral: Right shoulder pain - Primary M25.511     Relevant Orders    Follow Up In Physical Therapy    Sprain of shoulder, left S43.402A    Relevant Orders    Follow Up In Physical Therapy    Rotator cuff impingement syndrome of right shoulder M75.41    Relevant Orders    Follow Up In Physical Therapy    Localized osteoarthritis of right shoulder M19.011    Relevant Orders    Follow Up In Physical Therapy    Left shoulder pain M25.512    Relevant Orders    Follow Up In Physical Therapy           Other    Other sprain of right shoulder joint, initial encounter S43.491A    Relevant Orders    Follow Up In Physical Therapy  Referred By: Otf Worthington MD  General Comment: Pt reports upper trap and neck  "tighness and pain on arrival.  AMB with single crutch due to LB S/S.  Precautions:  Precautions  Precautions Comment: type 2 diabetic, potential dizziness/fainting per  Pain   0-10 (Numeric) Pain Score: 3  Pain Location: Neck  Pain Radiating Towards: R ant shoulder  Pain Descriptors: Dull, Discomfort  Pain Frequency: Constant/continuous  Clinical Progression: Gradually improving  Patient's Stated Pain Goal: No pain  Post Treatment Pain Level 2/10    Objective   Pt requires frequent postural cueing during ther ex progressions.    Treatments:  Therapeutic Exercise:  Therapeutic Exercise  Therapeutic Exercise Performed: Yes  Therapeutic Exercise Activity 1: UBE BWD/FWD 2' each  Therapeutic Exercise Activity 2: Wall slides flex/abduction 2x10 each  Therapeutic Exercise Activity 3: Scap retractions 5\" hold 2x10  Therapeutic Exercise Activity 4: Rows L3 band 2x10  Therapeutic Exercise Activity 5: shoulder extension L3 band 2x10  Therapeutic Exercise Activity 6: B ER L3 band 2x10  Therapeutic Exercise Activity 7: Utrap stretches  Therapeutic Exercise Activity 8: Chin tucks 5\" hold x10    Manual:  Manual Therapy  Manual Therapy Performed: Yes  Manual Therapy Activity 1: DSTM Utraps and levators  Manual Therapy Activity 2: MFR Utrpas and levators.       Assessment   Assessment:   PT Assessment  PT Assessment Results:  (abnormal or restricted ROM, impaired physical strength and pain with function  Assessment details: Pt demonstrated weakness and limited AROM in B shoulders. Progress B shoulder AROM and strengthening as able)  Rehab Prognosis: Fair  Evaluation/Treatment Tolerance: Patient tolerated treatment well  Assessment Comment: Pt demonstrates poor postural awareness during ther ex, decreased Utrap tightness and pain after manual.    Plan:   OP PT Plan  Treatment/Interventions: Other (comment) (cryotherapy and thermotherapy (hydrocollator packs)  Planned therapy interventions: body mechanics training, flexibility, " functional ROM exercises, HEP, therapeutic activities, stretching, strengthening, neuromuscular re-education and manual therapy)  PT Plan: Skilled PT  PT Frequency: 2 times per week  Duration: 6 weeks  Number of Treatments Authorized: 12  Rehab Potential: Fair  Plan of Care Agreement: Patient    OP EDUCATION:  Outpatient Education  Individual(s) Educated: Patient  Education Provided: Home Exercise Program, Body Mechanics, Anatomy  Patient/Caregiver Demonstrated Understanding: yes  Patient Response to Education: Patient/Caregiver Verbalized Understanding of Information, Patient/Caregiver Performed Return Demonstration of Exercises/Activities, Patient/Caregiver Asked Appropriate Questions    Goals:   Active       PT Problem       PT Goal 1       Start:  02/18/25    Expected End:  03/11/25       Pt will be independent with HEP to ensure benefit outside of PT    Pt will report a 25% or more reduction in pain to allow pt to be able to perform ADLs, hold grandchild          PT Goal 2       Start:  02/18/25    Expected End:  04/01/25       Pt will report a 75% or more reduction in pain  to allow pt to be able to perform ADLs, hold grandchild     Pt will improve strength of B shoulders to at least 4/5  to allow pt to be able to perform ADLs, hold grandchild     Pt will improve AROM of B shoulders to WFL  to allow pt to be able to perform ADLs, hold grandchild     Pt will improve score on UEFI by at least 20 points  to allow pt to be able to perform ADLs, hold grandchild     Pt will be able to hold grandchild with minimal to no difficulty

## 2025-03-25 ENCOUNTER — TREATMENT (OUTPATIENT)
Dept: PHYSICAL THERAPY | Facility: CLINIC | Age: 55
End: 2025-03-25
Payer: COMMERCIAL

## 2025-03-25 DIAGNOSIS — M19.011 LOCALIZED OSTEOARTHRITIS OF RIGHT SHOULDER: ICD-10-CM

## 2025-03-25 DIAGNOSIS — M75.41 ROTATOR CUFF IMPINGEMENT SYNDROME OF RIGHT SHOULDER: ICD-10-CM

## 2025-03-25 DIAGNOSIS — M25.511 RIGHT SHOULDER PAIN, UNSPECIFIED CHRONICITY: ICD-10-CM

## 2025-03-25 DIAGNOSIS — S43.491A OTHER SPRAIN OF RIGHT SHOULDER JOINT, INITIAL ENCOUNTER: ICD-10-CM

## 2025-03-25 DIAGNOSIS — M25.512 LEFT SHOULDER PAIN, UNSPECIFIED CHRONICITY: ICD-10-CM

## 2025-03-25 DIAGNOSIS — S43.402A SPRAIN OF LEFT SHOULDER, UNSPECIFIED SHOULDER SPRAIN TYPE, INITIAL ENCOUNTER: ICD-10-CM

## 2025-03-25 PROCEDURE — 97110 THERAPEUTIC EXERCISES: CPT | Mod: GP,CQ

## 2025-03-25 PROCEDURE — 97140 MANUAL THERAPY 1/> REGIONS: CPT | Mod: GP,CQ

## 2025-03-25 ASSESSMENT — PAIN SCALES - GENERAL: PAINLEVEL_OUTOF10: 3

## 2025-03-25 ASSESSMENT — PAIN DESCRIPTION - DESCRIPTORS: DESCRIPTORS: DISCOMFORT

## 2025-03-25 NOTE — PROGRESS NOTES
Physical Therapy Treatment    Patient Name: Camille Romero  MRN: [id number]  Today's Date: 3/25/2025  Time Calculation  Start Time: 0845  Stop Time: 0930  Time Calculation (min): 45 min  PT Therapeutic Procedures Time Entry  Manual Therapy Time Entry: 8  Therapeutic Exercise Time Entry: 35    Insurance:  Visit number: 3 of 13  Authorization info: 2025: [employer] - NO AUTH / 100% COVERAGE / 30V/yr - Check w/pt, 0 used per Epic / ds 2/17/25.   CPT 31197, 70520 - covered - No auth until auth is required for therapy //    Insurance Type: Payor: [facility] / Plan: [employer] / Product Type: *No Product type* /     Current Problem   1. Right shoulder pain, unspecified chronicity  Follow Up In Physical Therapy      2. Rotator cuff impingement syndrome of right shoulder  Follow Up In Physical Therapy      3. Other sprain of right shoulder joint, initial encounter  Follow Up In Physical Therapy      4. Localized osteoarthritis of right shoulder  Follow Up In Physical Therapy      5. Sprain of left shoulder, unspecified shoulder sprain type, initial encounter  Follow Up In Physical Therapy      6. Left shoulder pain, unspecified chronicity  Follow Up In Physical Therapy          Subjective   General   Reason for Referral: Right shoulder pain - Primary M25.511     Relevant Orders    Follow Up In Physical Therapy    Sprain of shoulder, left S43.402A    Relevant Orders    Follow Up In Physical Therapy    Rotator cuff impingement syndrome of right shoulder M75.41    Relevant Orders    Follow Up In Physical Therapy    Localized osteoarthritis of right shoulder M19.011    Relevant Orders    Follow Up In Physical Therapy    Left shoulder pain M25.512    Relevant Orders    Follow Up In Physical Therapy           Other    Other sprain of right shoulder joint, initial encounter S43.491A    Relevant Orders    Follow Up In Physical Therapy  Referred By: Otf Worthington MD  General Comment: Pt reports neck and B parascapular  "tightness, soreness.  Precautions:  Precautions  Precautions Comment: type 2 diabetic, potential dizziness/fainting per  Pain   0-10 (Numeric) Pain Score: 3  Pain Location: Neck  Pain Radiating Towards: B scaps  Pain Descriptors: Discomfort  Pain Frequency: Constant/continuous  Clinical Progression: Not changed  Patient's Stated Pain Goal: No pain  Post Treatment Pain Level 1/10    Objective   AROM R shoulder flex 144 degrees, abduction 135 degrees. L shoulder flex 149 degrees, abduction 120 degrees.    Treatments:  Therapeutic Exercise:  Therapeutic Exercise  Therapeutic Exercise Performed: Yes  Therapeutic Exercise Activity 1: UBE BWD/FWD 2' each  Therapeutic Exercise Activity 2: Scap retractions 5\" hold 2x10  Therapeutic Exercise Activity 3: Scap retractions 5\" hold 2x10  Therapeutic Exercise Activity 4: Seated Rows L3 band 2x10  Therapeutic Exercise Activity 5: Seated shoulder extension L3 band 2x10  Therapeutic Exercise Activity 6: B ER L3 band 2x10  Therapeutic Exercise Activity 7: Wallslides flexion 5\" hold L/R 2x10 each  Therapeutic Exercise Activity 8: Wallslide abduction 5\" hold L/R 2x10 each  Therapeutic Exercise Activity 9: Utrap stretches  Therapeutic Exercise Activity 10: Chin tucks 5\" hold x10    Manual:  Manual Therapy  Manual Therapy Performed: Yes  Manual Therapy Activity 1: DSTM Utraps and levators  Manual Therapy Activity 2: MFR Utrpas and levators.     Assessment   Assessment:   PT Assessment  PT Assessment Results:  (abnormal or restricted ROM, impaired physical strength and pain with function  Assessment details: Pt demonstrated weakness and limited AROM in B shoulders. Progress B shoulder AROM and strengthening as able)  Rehab Prognosis: Fair  Evaluation/Treatment Tolerance: Patient tolerated treatment well  Assessment Comment: Pt demonstrates mimproved postural awareness to fair during ther ex. Demonstrates [id number] shoulder flexion, abduction as noted.    Plan:   OP PT " Plan  Treatment/Interventions: Other (comment) (Continue to progress B shoulder strengthening/stability activites)  PT Plan: Skilled PT  PT Frequency: 2 times per week  Duration: 6 weeks  Number of Treatments Authorized: 12  Rehab Potential: Fair  Plan of Care Agreement: Patient    OP EDUCATION:  Outpatient Education  Individual(s) Educated: Patient  Education Provided: Anatomy, Body Mechanics  Patient/Caregiver Demonstrated Understanding: yes  Patient Response to Education: Patient/Caregiver Verbalized Understanding of Information, Patient/Caregiver Performed Return Demonstration of Exercises/Activities, Patient/Caregiver Asked Appropriate Questions    Goals:   Active       PT Problem       PT Goal 1       Start:  02/18/25    Expected End:  03/11/25       Pt will be independent with HEP to ensure benefit outside of PT    Pt will report a 25% or more reduction in pain to allow pt to be able to perform ADLs, hold grandchild          PT Goal 2       Start:  02/18/25    Expected End:  04/01/25       Pt will report a 75% or more reduction in pain  to allow pt to be able to perform ADLs, hold grandchild     Pt will improve strength of B shoulders to at least 4/5  to allow pt to be able to perform ADLs, hold grandchild     Pt will improve AROM of B shoulders to WFL  to allow pt to be able to perform ADLs, hold grandchild     Pt will improve score on UEFI by at least 20 points  to allow pt to be able to perform ADLs, hold grandchild     Pt will be able to hold grandchild with minimal to no difficulty

## 2025-04-01 ENCOUNTER — DOCUMENTATION (OUTPATIENT)
Dept: PHYSICAL THERAPY | Facility: CLINIC | Age: 55
End: 2025-04-01
Payer: COMMERCIAL

## 2025-04-01 ENCOUNTER — APPOINTMENT (OUTPATIENT)
Dept: PHYSICAL THERAPY | Facility: CLINIC | Age: 55
End: 2025-04-01
Payer: COMMERCIAL

## 2025-04-01 NOTE — PROGRESS NOTES
Physical Therapy Missed Visit                  Therapy Communication Note    Patient Name: Camille Romero  MRN: [id number]  Today's Date: 4/1/2025     Discipline: Physical Therapy          Missed Visit Reason:  Overslept     Missed Time: Cancel    Comment:

## 2025-04-08 ENCOUNTER — TREATMENT (OUTPATIENT)
Dept: PHYSICAL THERAPY | Facility: CLINIC | Age: 55
End: 2025-04-08
Payer: COMMERCIAL

## 2025-04-08 DIAGNOSIS — M25.512 LEFT SHOULDER PAIN, UNSPECIFIED CHRONICITY: ICD-10-CM

## 2025-04-08 DIAGNOSIS — M19.011 LOCALIZED OSTEOARTHRITIS OF RIGHT SHOULDER: ICD-10-CM

## 2025-04-08 DIAGNOSIS — S43.491A OTHER SPRAIN OF RIGHT SHOULDER JOINT, INITIAL ENCOUNTER: ICD-10-CM

## 2025-04-08 DIAGNOSIS — S43.402A SPRAIN OF LEFT SHOULDER, UNSPECIFIED SHOULDER SPRAIN TYPE, INITIAL ENCOUNTER: ICD-10-CM

## 2025-04-08 DIAGNOSIS — M75.41 ROTATOR CUFF IMPINGEMENT SYNDROME OF RIGHT SHOULDER: ICD-10-CM

## 2025-04-08 DIAGNOSIS — M25.511 RIGHT SHOULDER PAIN, UNSPECIFIED CHRONICITY: ICD-10-CM

## 2025-04-08 PROCEDURE — 97530 THERAPEUTIC ACTIVITIES: CPT | Mod: GP

## 2025-04-08 NOTE — PROGRESS NOTES
Physical Therapy Progress Report    Patient Name: Camille Romero  MRN: [id number]  Encounter date: 4/8/2025    Time Calculation  Start Time: 0905  Stop Time: 0944  Time Calculation (min): 39 min  PT Therapeutic Procedures Time Entry  Therapeutic Exercise Time Entry: 3  Therapeutic Activity Time Entry: 36    Visit # 4 of 13  Visits/Dates Authorized: 2025: CARENEENAPASHA MCAID - NO AUTH / 100% COVERAGE / 30V/yr - Check w/pt, 0 used per Epic / ds 2/17/25.   CPT 96377, 60893 - covered - No auth until auth is required for therapy //     Current Problem:  1. Right shoulder pain, unspecified chronicity  Follow Up In Physical Therapy      2. Rotator cuff impingement syndrome of right shoulder  Follow Up In Physical Therapy      3. Other sprain of right shoulder joint, initial encounter  Follow Up In Physical Therapy      4. Localized osteoarthritis of right shoulder  Follow Up In Physical Therapy      5. Sprain of left shoulder, unspecified shoulder sprain type, initial encounter  Follow Up In Physical Therapy      6. Left shoulder pain, unspecified chronicity  Follow Up In Physical Therapy            Precautions: type 2 diabetic, potential dizziness/fainting per           Subjective   General   Pt reported that she still has pain especially when she is with her grandchild. She does believe pain has improved since starting PT. Pt says she has been compliant with her HEP.     Pre-Treatment Symptoms: 5/10          Objective           Outcome Measures:  Other Measures  Other Outcome Measures: 41/80  ROM   L shoulder flex: 151; L shoulder abd: 141; L shoulder IR BTB: T11; L shoulder ER: 80, T1  R shoulder flex: 155; R shoulder abd: 130; R shoulder IR BTB: T10; R shoulder ER: 70, T1    MMT   R shoulder flex: 4+/5; R shoulder abd: 4/5; R shoulder IR: 4+/5; R shoulder ER: 4/5  L shoulder flex: 5/5; L shoulder abd: 4+/5; L shoulder IR: 5/5; L shoulder ER: 4+/5    Outcome Measures:  Other Measures  Other Outcome Measures:  41/80    Treatments:  Therapeutic activity:   Progress report, HEP review     Therapeutic exercise:   Pendulums: 30-45 seconds B, 30-45 seconds with 2lbs B   Assessment:    Pt demonstrated improvement with AROM and strength in B shoulders since IE as above. She still struggles with pain and difficulty with lifting/carrying her grandchild. Including kailynal this the 4th visit. Will recommend continuing PT to complete additional 9 visits per orginial POC. Consider discharge sooner if all goals are met or if plan changes after seeing physician    Post-Treatment Symptoms:    Reduced pain in R shoulder    Plan:    Continue 2 times per week for 7 weeks or up to 9 visits per previous POC  Pt educated on HEP-given handout, POC ,assessment, following up with physician    Access Code: [email or web address]  URL: [email or web address]  Date: 04/08/2025  Prepared by: Wilber Roland    Exercises  - Seated Shoulder External Rotation PROM on Table  - 1-3 x daily - 7 x weekly - 3 sets - 30seconds hold  - Seated Shoulder Pendulum Exercise  - 1-3 x daily - 7 x weekly - 1-2 sets - 30-60seconds hold  Goals:   Active       PT Problem       PT Goal 1 (Progressing)       Start:  02/18/25    Expected End:  05/06/25       Pt will be independent with HEP to ensure benefit outside of PT    Pt will report a 25% or more reduction in pain to allow pt to be able to perform ADLs, hold grandchild       Goal Note       Pt reported independence with HEP    Pain reported 5/10 this session; 6/10 on IE                  PT Goal 2 (Progressing)       Start:  02/18/25    Expected End:  05/27/25       Pt will report a 75% or more reduction in pain  to allow pt to be able to perform ADLs, hold grandchild     Pt will improve strength of B shoulders to at least 4/5  to allow pt to be able to perform ADLs, hold grandchild     Pt will improve AROM of B shoulders to WFL  to allow pt to be able to perform ADLs, hold grandchild     Pt will improve score on UEFI by at  least 20 points  to allow pt to be able to perform ADLs, hold grandchild     Pt will be able to hold grandchild with minimal to no difficulty       Goal Note       Pain reported 5/10 this session; 6/10 on IE    R shoulder flex: 4+/5; R shoulder abd: 4/5; R shoulder IR: 4+/5; R shoulder ER: 4/5  L shoulder flex: 5/5; L shoulder abd: 4+/5; L shoulder IR: 5/5; L shoulder ER: 4+/5     L shoulder flex: 151; L shoulder abd: 141; L shoulder IR BTB: T11; L shoulder ER: 80, T1  R shoulder flex: 155; R shoulder abd: 130; R shoulder IR BTB: T10; R shoulder ER: 70, T1    UEFI: 41/80 improved by 15 since IE    Pt reported difficulty with holding grandchild still

## 2025-04-10 NOTE — PROGRESS NOTES
Subjective      Chief Complaint   Patient presents with    Right Shoulder - Follow-up, Pain    Left Shoulder - Follow-up, Pain        Past Surgical History:   Procedure Laterality Date    ADENOIDECTOMY      COLONOSCOPY      CORONARY ARTERY BYPASS GRAFT  2009    X 1 graft    TONSILLECTOMY          HPI  This 54 year old patient presents today for reevaluation of  bilateral shoulder pain right worse than left. She currently rates her bilateral shoulder pain at 1-2/10. She has a history of herniated disc in the lumbar spine and follows with pain management and using a crutch for support. The patient states that the right and left shoulder pain has significantly improved with PT, and she is happy with her progress.     CARDIOLOGY:   Negative for chest pain, shortness of breath.   RESPIRATORY:   Negative for chest pain, shortness of breath.   MUSCULOSKELETAL:   See HPI for details.   NEUROLOGY:   Negative for tingling, numbness, weakness.    Objective      There were no vitals taken for this visit.     SHOULDER EXAM  Constitutional: Appears stated age. No apparent distress  Labored Breathing: No  Psychiatric: Normal mood and effect.   Neurological: alert and oriented x3  Skin: intact  HEENT: No bruising, otorrhea, rhinorrhea.  MUSCULOSKELETAL: Neck: No tenderness. No pain or limitation with range of motion. Back: No tenderness. Straight leg test negative bilaterally. Left shoulder: There is no tenderness anteriorly and laterally. Full active and passive ROM. Comparments are soft. Neurovascular is intact.  Right shoulder: There is no tenderness anteriorly and laterally. Full active and passive ROM. There is no pain or limitation of active and passive internal and external rotation. Comparments are soft. Neurovascular is intact.     AP and lateral x-rays of the right and left shoulder done on 10- shows no evidence of acute fracture or both destruction.     MR of right shoulder lian on 12- listed below is  reviewed with the patient in the office today  IMPRESSION:  1. Moderate periarticular marrow edema about the acromioclavicular  joint in the distal clavicle and corresponding acromion process with  findings superimposed on osteoarthritic degenerative change.  Component of osteolysis about the articulation is not excluded. There  is mild surrounding pericapsular edema.      2. Mild supraspinatus myotendinous edema with straining with edema  along the anterior leading edge of the supraspinatus in the rotator  interval.      3. No focal full-thickness rotator cuff tear    Camille was seen today for follow-up, pain, follow-up and pain.  Diagnoses and all orders for this visit:  Right shoulder pain, unspecified chronicity (Primary)  Rotator cuff impingement syndrome of right shoulder  Acute bursitis of right shoulder  Localized osteoarthritis of right shoulder  Left shoulder pain, unspecified chronicity  Rotator cuff impingement syndrome of left shoulder  Acute bursitis of left shoulder    Options are discussed with the patient in detail. The patient is instructed regarding activity modification and risk for further injury with falling or trauma, ice, provider directed at home gentle strengthening and ROM exercises, and the appropriate use of Tylenol as needed for pain with its potential adverse reactions and side effects. The patient understands.  Return  as needed.  Please note that this report has been produced using speech recognition software.  It may contain errors related to grammar, punctuation or spelling.  Electronically signed, but not reviewed.    Letty Khan PA-C

## 2025-04-15 ENCOUNTER — APPOINTMENT (OUTPATIENT)
Dept: PHYSICAL THERAPY | Facility: CLINIC | Age: 55
End: 2025-04-15
Payer: COMMERCIAL

## 2025-04-15 ENCOUNTER — OFFICE VISIT (OUTPATIENT)
Dept: ORTHOPEDIC SURGERY | Facility: CLINIC | Age: 55
End: 2025-04-15
Payer: COMMERCIAL

## 2025-04-15 ENCOUNTER — DOCUMENTATION (OUTPATIENT)
Dept: PHYSICAL THERAPY | Facility: CLINIC | Age: 55
End: 2025-04-15
Payer: COMMERCIAL

## 2025-04-15 VITALS — WEIGHT: 252 LBS | HEIGHT: 67 IN | BODY MASS INDEX: 39.55 KG/M2

## 2025-04-15 DIAGNOSIS — M51.369 DEGENERATION OF INTERVERTEBRAL DISC OF LUMBAR REGION, UNSPECIFIED WHETHER PAIN PRESENT: ICD-10-CM

## 2025-04-15 DIAGNOSIS — M75.42 ROTATOR CUFF IMPINGEMENT SYNDROME OF LEFT SHOULDER: ICD-10-CM

## 2025-04-15 DIAGNOSIS — M25.511 RIGHT SHOULDER PAIN, UNSPECIFIED CHRONICITY: Primary | ICD-10-CM

## 2025-04-15 DIAGNOSIS — M75.41 ROTATOR CUFF IMPINGEMENT SYNDROME OF RIGHT SHOULDER: ICD-10-CM

## 2025-04-15 DIAGNOSIS — M19.011 LOCALIZED OSTEOARTHRITIS OF RIGHT SHOULDER: ICD-10-CM

## 2025-04-15 DIAGNOSIS — M75.51 ACUTE BURSITIS OF RIGHT SHOULDER: ICD-10-CM

## 2025-04-15 DIAGNOSIS — M75.52 ACUTE BURSITIS OF LEFT SHOULDER: ICD-10-CM

## 2025-04-15 DIAGNOSIS — M25.512 LEFT SHOULDER PAIN, UNSPECIFIED CHRONICITY: ICD-10-CM

## 2025-04-15 PROCEDURE — 4004F PT TOBACCO SCREEN RCVD TLK: CPT | Performed by: PHYSICIAN ASSISTANT

## 2025-04-15 PROCEDURE — 3008F BODY MASS INDEX DOCD: CPT | Performed by: PHYSICIAN ASSISTANT

## 2025-04-15 PROCEDURE — 99213 OFFICE O/P EST LOW 20 MIN: CPT | Performed by: PHYSICIAN ASSISTANT

## 2025-04-15 PROCEDURE — 3044F HG A1C LEVEL LT 7.0%: CPT | Performed by: PHYSICIAN ASSISTANT

## 2025-04-15 ASSESSMENT — LIFESTYLE VARIABLES
HOW OFTEN DO YOU HAVE A DRINK CONTAINING ALCOHOL: NEVER
HOW OFTEN DO YOU HAVE SIX OR MORE DRINKS ON ONE OCCASION: NEVER

## 2025-04-15 ASSESSMENT — ENCOUNTER SYMPTOMS
LOSS OF SENSATION IN FEET: 0
DEPRESSION: 0
OCCASIONAL FEELINGS OF UNSTEADINESS: 0

## 2025-04-15 ASSESSMENT — PAIN - FUNCTIONAL ASSESSMENT: PAIN_FUNCTIONAL_ASSESSMENT: 0-10

## 2025-04-15 ASSESSMENT — PAIN SCALES - GENERAL
PAINLEVEL_OUTOF10: 8
PAINLEVEL_OUTOF10: 8

## 2025-04-15 NOTE — PROGRESS NOTES
Physical Therapy                 Therapy Communication Note    Patient Name: Camille Romero  MRN: [id number]  Department:   Room: Room/bed info not found  Today's Date: 4/15/2025     Discipline: Physical Therapy          Missed Visit Reason:      Missed Time: Cancel    Comment:Pt cancels today's appt

## 2025-04-16 DIAGNOSIS — I10 PRIMARY HYPERTENSION: ICD-10-CM

## 2025-04-16 DIAGNOSIS — E11.9 TYPE 2 DIABETES MELLITUS WITHOUT COMPLICATION, WITHOUT LONG-TERM CURRENT USE OF INSULIN: ICD-10-CM

## 2025-04-16 RX ORDER — BLOOD SUGAR DIAGNOSTIC
200 STRIP MISCELLANEOUS 2 TIMES DAILY
Qty: 200 EACH | Refills: 3 | Status: SHIPPED | OUTPATIENT
Start: 2025-04-16

## 2025-04-16 RX ORDER — BLOOD-GLUCOSE SENSOR
1 EACH MISCELLANEOUS CONTINUOUS
Qty: 2 EACH | Refills: 2 | Status: SHIPPED | OUTPATIENT
Start: 2025-04-16

## 2025-04-16 RX ORDER — VALSARTAN 40 MG/1
80 TABLET ORAL DAILY
Qty: 180 TABLET | Refills: 1 | Status: SHIPPED | OUTPATIENT
Start: 2025-04-16 | End: 2025-10-13

## 2025-04-16 RX ORDER — BLOOD-GLUCOSE SENSOR
2 EACH MISCELLANEOUS CONTINUOUS
COMMUNITY
End: 2025-04-16 | Stop reason: SDUPTHER

## 2025-04-22 ENCOUNTER — APPOINTMENT (OUTPATIENT)
Dept: PHYSICAL THERAPY | Facility: CLINIC | Age: 55
End: 2025-04-22
Payer: COMMERCIAL

## 2025-04-28 ENCOUNTER — OFFICE VISIT (OUTPATIENT)
Dept: PAIN MEDICINE | Facility: CLINIC | Age: 55
End: 2025-04-28
Payer: COMMERCIAL

## 2025-04-28 VITALS — SYSTOLIC BLOOD PRESSURE: 90 MMHG | DIASTOLIC BLOOD PRESSURE: 60 MMHG | OXYGEN SATURATION: 95 % | HEART RATE: 72 BPM

## 2025-04-28 DIAGNOSIS — Z79.891 LONG TERM (CURRENT) USE OF OPIATE ANALGESIC: ICD-10-CM

## 2025-04-28 DIAGNOSIS — M54.16 LUMBAR RADICULOPATHY: Primary | ICD-10-CM

## 2025-04-28 DIAGNOSIS — M51.369 DEGENERATION OF INTERVERTEBRAL DISC OF LUMBAR REGION, UNSPECIFIED WHETHER PAIN PRESENT: ICD-10-CM

## 2025-04-28 DIAGNOSIS — F17.200 TOBACCO USE DISORDER: ICD-10-CM

## 2025-04-28 DIAGNOSIS — I95.9 HYPOTENSION, UNSPECIFIED HYPOTENSION TYPE: ICD-10-CM

## 2025-04-28 PROCEDURE — 99406 BEHAV CHNG SMOKING 3-10 MIN: CPT | Performed by: ANESTHESIOLOGY

## 2025-04-28 PROCEDURE — 4010F ACE/ARB THERAPY RXD/TAKEN: CPT | Performed by: ANESTHESIOLOGY

## 2025-04-28 PROCEDURE — 99204 OFFICE O/P NEW MOD 45 MIN: CPT | Performed by: ANESTHESIOLOGY

## 2025-04-28 PROCEDURE — 3078F DIAST BP <80 MM HG: CPT | Performed by: ANESTHESIOLOGY

## 2025-04-28 PROCEDURE — 3044F HG A1C LEVEL LT 7.0%: CPT | Performed by: ANESTHESIOLOGY

## 2025-04-28 PROCEDURE — 4004F PT TOBACCO SCREEN RCVD TLK: CPT | Performed by: ANESTHESIOLOGY

## 2025-04-28 PROCEDURE — 99214 OFFICE O/P EST MOD 30 MIN: CPT | Performed by: ANESTHESIOLOGY

## 2025-04-28 PROCEDURE — 3074F SYST BP LT 130 MM HG: CPT | Performed by: ANESTHESIOLOGY

## 2025-04-28 ASSESSMENT — ENCOUNTER SYMPTOMS
HEMATOLOGIC/LYMPHATIC NEGATIVE: 1
GASTROINTESTINAL NEGATIVE: 1
PSYCHIATRIC NEGATIVE: 1
RESPIRATORY NEGATIVE: 1
CARDIOVASCULAR NEGATIVE: 1
ENDOCRINE NEGATIVE: 1
CONSTITUTIONAL NEGATIVE: 1
NUMBNESS: 1
WEAKNESS: 1
BACK PAIN: 1
LOSS OF SENSATION IN FEET: 0
EYES NEGATIVE: 1
DEPRESSION: 0
OCCASIONAL FEELINGS OF UNSTEADINESS: 0

## 2025-04-28 ASSESSMENT — PAIN - FUNCTIONAL ASSESSMENT: PAIN_FUNCTIONAL_ASSESSMENT: 0-10

## 2025-04-28 ASSESSMENT — PAIN SCALES - GENERAL
PAINLEVEL_OUTOF10: 8
PAINLEVEL_OUTOF10: 8

## 2025-04-28 ASSESSMENT — LIFESTYLE VARIABLES: TOTAL SCORE: 1

## 2025-04-28 NOTE — PROGRESS NOTES
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    Pt describes LB pain since 2022 without inciting trauma/incident. Pain radiates down anterior RLE to top of foot/toes assoc with numbness, weakness. Pain is worse with standing/walking. Pain is alleviated with sitting down. Saw Dr Mckay in past who has reportedly managed pt with NEYMAR and tramadol, but pt feels pain is uncontrolled. She had an MRI done in 2023, but pt feels pain has worsened since then and uses cane to get around.  Pt has tried Tylenol, ibuprofen, >6 w PT (last done 2024- just finished PT for R shoulder), Gralise, TCA, tramadol without sustained relief. She is interested in a stronger medication.     Pt denies new-onset numbness, weakness, bowel/bladder incontinence.  Pt denies recent infection, allergy to iodine/contrast. Patient is currently taking the following blood thinner(s): N/A    REVIEW OF SYSTEMS  Review of Systems   Constitutional: Negative.    HENT: Negative.     Eyes: Negative.    Respiratory: Negative.     Cardiovascular: Negative.    Gastrointestinal: Negative.    Endocrine: Negative.    Musculoskeletal:  Positive for back pain.   Skin: Negative.    Neurological:  Positive for weakness and numbness.   Hematological: Negative.    Psychiatric/Behavioral: Negative.         PAST MEDICAL HISTORY  Medical History[1]  Surgical History[2]  Family History[3]    CURRENT MEDICATIONS  Current Medications[4]    ALLERGIES AND DRUG REACTIONS  Allergies[5]       OBJECTIVE  Visit Vitals  BP 90/60   Pulse 72   SpO2 95%   OB Status Hysterectomy   Smoking Status Every Day       Last Recorded Pain Score (if available):           Pain Score:   8      Physical Exam  Vitals and nursing note reviewed.     General: Sitting in chair, NAD  Head: NCAT  Eyes: Sclera/conjunctiva clear, EOMI, PERRL  Nose/mouth: MMM  CV: Good distal pulses  Lungs: Good/equal chest excursion  Abdomen: Soft, ND  Ext: No cyanosis/edema  MSK: L-spine alignment: unremarkable, BL paraspinal m TTP, L-spine ROM: extension lmtd by pain with +facet-loading, pain on flexion    Neuro: AAOx3, CN grossly nl  Dermatome sensation to light touch  LEFT L1 (lower pelvis/upper thigh): WNL    RIGHT L1: WNL      LEFT L2 (upper thigh): decreased       RIGHT: L2:decreased         LEFT L3 (medial knee): decreased       RIGHT L3: decreased         LEFT L4 (superior medial malleolus): decreased       RIGHT L4: decreased         LEFT L5 (dorsal foot): decreased       RIGHT L5: decreased         LEFT S1 (lateral foot): decreased       RIGHT S1: decreased         LEFT S2 (popliteal fossa): decreased       RIGHT S2: decreased           Motor strength (effort unclear, but reportedly lmtd by back pain)  LEFT L2 (hip flexion): 4/5   RIGHT L2: 4/5  LEFT L3 (knee extension): 3/5     RIGHT L3: 3/5  LEFT L4 (dorsiflexion): 5/5     RIGHT L4: 5/5  LEFT L5 (EHL extension): 5/5     RIGHT L5: 5/5  LEFT S1 (plantarflexion): 5/5     RIGHT S1: 5/5  LEFT S2 (knee flexion): 35/5      RIGHT S2: 3/5    Special testing  DTR unremarkable  Seated slump test +RLE  Clonus: neg BL  Babinski: neg BL    Psych: affect nl  Skin: no rash/lesions      REVIEW OF LABORATORY DATA  I have reviewed the following lab results:  WBC   Date Value Ref Range Status   07/09/2024 9.1 4.4 - 11.3 x10*3/uL Final     RBC   Date Value Ref Range Status   07/09/2024 5.10 4.00 - 5.20 x10*6/uL Final     Hemoglobin   Date Value Ref Range Status   07/09/2024 15.7 12.0 - 16.0 g/dL Final     Hematocrit   Date Value Ref Range Status   07/09/2024 47.6 (H) 36.0 - 46.0 % Final     MCV   Date Value Ref Range Status   07/09/2024 93 80 - 100 fL Final     MCH   Date Value Ref  "Range Status   07/09/2024 30.8 26.0 - 34.0 pg Final     MCHC   Date Value Ref Range Status   07/09/2024 33.0 32.0 - 36.0 g/dL Final     RDW   Date Value Ref Range Status   07/09/2024 12.7 11.5 - 14.5 % Final     Platelets   Date Value Ref Range Status   07/09/2024 206 150 - 450 x10*3/uL Final     MPV   Date Value Ref Range Status   06/21/2023 10.8 7.0 - 12.6 CU Final     Sodium   Date Value Ref Range Status   07/09/2024 142 133 - 145 mmol/L Final     Potassium   Date Value Ref Range Status   07/09/2024 4.3 3.4 - 5.1 mmol/L Final     Bicarbonate   Date Value Ref Range Status   07/09/2024 23 (L) 24 - 31 mmol/L Final     Urea Nitrogen   Date Value Ref Range Status   07/09/2024 15 8 - 25 mg/dL Final     Calcium   Date Value Ref Range Status   07/09/2024 9.1 8.5 - 10.4 mg/dL Final     No results found for: \"PROTIME\", \"PTT\", \"INR\", \"FIBRINOGEN\"      REVIEW OF RADIOLOGY   I have reviewed the following:  Radiology Studies           MRI L-spine 8/31/23:  Narrative & Impression   PROCEDURE:         SPINE LUMBAR WO CONTRAST - [facility]  2010  REASON FOR EXAM: M54.13 RADICULOPATHY, CERVICOTHORACIC REGION     RESULT: MRN: [id number]  Patient Name: BETTE YUEN     STUDY:  SPINE LUMBAR WO CONTRAST; 8/31/2023 5:14 pm     INDICATION:  M54.13 RADICULOPATHY, CERVICOTHORACIC REGION;     COMPARISON:  None available.     ACCESSION NUMBER(S):  [id number]     ORDERING CLINICIAN:  EHIDE BURGOS     TECHNIQUE:  Routine MRI of the lumbar spine was performed without contrast     FINDINGS:  RESULT:  Counting reference: Lumbosacral junction.     Alignment: Alignment is anatomic.     Bone marrow signal/fracture: No evidence of pathologic marrow infiltration.  No evidence of prior fracture.     Conus: The conus is within normal limits of signal intensity and morphology.  The conus terminates normally at L1.     Paraspinal soft tissues: Paraspinal soft tissues are unremarkable.     Lower thoracic spine: Visualized lower thoracic canal and foramina " are  patent.     L1-L2: Canal and foramina are patent.     L2-L3: Disc bulge and facet hypertrophy is noted. There is mild canal  stenosis. Neural foramina are patent     L3-L4: Facet hypertrophy is noted. Canal and foramina are patent     L4-L5: Facet hypertrophy is noted. There is mild right-sided neural  foraminal narrowing     L5-S1: Canal and foramina are patent     Sacrum and iliac wings: The visualized sacrum and iliac wings are within  normal limits.  The presacral soft tissues are normal in appearance.     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    1) LBP  -Since 2022 radiating down RLE with obj numbness/weakness w/o focal deficit. BLE numbness worst in feet likely 2/2 superimposed DPN  -Refractive to >3 y conservative tx including Tylenol, ibuprofen, >6 w PT (last done 2024- just finished PT for R shoulder), Gralise, TCA, tramadol, NEYMAR  -Reviewed/discussed MRI L-spine 8/31/23: multilevel mild spondylosis and stenosis out of proportion with pt signs/sx  --Order new MRI L-spine in context of pt's worsening pain and exam  -Consider EMG based on MRI report  -In meantime, see opioid mgmt below    2) Opioid mgmt  -2/2 chronic LBP refractive to conservative, interventional tx  -On tramadol 50 mg TID PRN with mild relief  - Pt suffers from chronic pain as detailed above that has impacted patient's ability to work and/or complete ADLs/function and maintain quality of life. Discussed that in order to assume opiate prescription safely, need to sign pain agreement.  Patient is to be is prescribed the minimal effective dose and to bee seen on a regular basis for refills and dose adjustments. Reviewed risks and mitigation factors related to opioid prescribing including common SE,  withdrawal, OUD, potentially fatal respiratory depression. Reminded patient these are the reasons medication cannot be self-titrated and thus, must be taken as prescribed and not with other controlled substances unless otherwise instructed.  Patient will be subject to random drug testing- UDS today.  Opioids may impact ability to operate vehicle/heavy machinery and may affect work toxicology. Patient does not use other controlled substances or alcohol on an outpatient basis in combination with this prescription.  Discussed safe storage of opioids and importance to bring bottle to appointments for potential pill count. All questions answered and patient agrees.  reviewed/appropriate (Norco 5 mg x8 tabs on 4/14 for tooth pull). Pain agreement signed.  -Consider Belbuca    3) Smoking  Patient smokes 1/4 ppd. Spent 3 min encouraging/counseling on smoking cessation as this may increase systemic inflammatory factors which may contribute to patient's chronic pain in addition to smoking as generalized health detriments.    4) Low blood pressure  BP 90/60   Pulse 72   SpO2 95%   -Asymptomatic- denies CP, SOB, HA, dizziness, syncope  -Advised pt to call PCP for potential modification in anti-hypertensives. Pt said she will call after appointment              Discussed procedure risks/benefits in detail with patient. Pt meets medical necessity for procedure due to failure of conservative measures. Reviewed procedural risks including bleeding, infection, nerve damage, paralysis. Also reviewed mitigating factors such as screening for infection/blood thinner use, sterile precautions, and image-guidance when applicable. All questions answered. Pt/guardian expressed understanding and choose to proceed    Today's visit involved establishment of care and a complete examination with review of records. In the context of the complexity of this patient's chronic pain diagnosis, long-term expectations and care planning discussed.  Imaging studies ordered are placed do elucidate the patient's diagnosis, but also to evaluate the patient's candidacy for procedural and surgical interventions. The risks and benefits of these potential interventions are detailed as above.         Yuki Gallagher MD  Anesthesiologist & Interventional Pain Physician   [facility]  O: [phone]  F: [phone]  9:55 AM  04/28/25         [1]   Past Medical History:  Diagnosis Date    Anxiety     Asthma     Coronary artery disease     Depression     GERD (gastroesophageal reflux disease)     Hyperlipidemia     Hypertension     Myocardial infarction (Multi)     Obesity     Severe obesity (BMI >= 40) (Multi) 09/01/2023    Sleep apnea     Type 2 diabetes mellitus    [2]   Past Surgical History:  Procedure Laterality Date    ADENOIDECTOMY      COLONOSCOPY      CORONARY ARTERY BYPASS GRAFT  2009    X 1 graft    TONSILLECTOMY     [3]   Family History  Problem Relation Name Age of Onset    Hypertension Mother      Stroke Mother          CVA    Diabetes Father      Hypertension Father      Heart disease Father      Cancer Father      Heart disease Father's Brother      Other (stents) Father's Brother      Other (defribrillator) Father's Brother     [4]   Current Outpatient Medications   Medication Sig Dispense Refill    albuterol (Proventil HFA) 90 mcg/actuation inhaler Inhale 2 puffs every 4 hours if needed.      amitriptyline (Elavil) 25 mg tablet Take 2 tablets (50 mg) by mouth once daily at bedtime. 180 tablet 1    blood-glucose meter misc Twice daily 1 each 0    blood-glucose sensor (FreeStyle Melissa 3 Plus Sensor) device 1 each continuously. 2 each 2    butalbital-acetaminophen-caff -40 mg tablet TAKE 1 TO 2 TABLET BY MOUTH EVERY 4 HOURS AS NEEDED F      cyanocobalamin, vitamin B-12, (VITAMIN B-12 ORAL) 1000 MCG  as directed Orally      docusate sodium (Colace) 100 mg capsule Take 1 capsule (100 mg) by mouth 2 times a day as needed for  constipation. 60 capsule 1    dulaglutide (Trulicity) 4.5 mg/0.5 mL pen injector Inject 4.5 mg under the skin 1 (one) time per week. 2 mL 5    ergocalciferol (Vitamin D-2) 1.25 MG (30874 UT) capsule Take 1 capsule (1.25 mg) by mouth 1 (one) time per week. THURSDAY      escitalopram (Lexapro) 20 mg tablet Take 1 tablet (20 mg) by mouth once daily at bedtime. 90 tablet 1    ezetimibe (Zetia) 10 mg tablet Take 1 tablet (10 mg) by mouth once daily. 90 tablet 3    FreeStyle Lancets 28 gauge USE AS DIRECTED EVERY 12 HOURS      FreeStyle Melissa 3 Sensor device 1 each continuously. 2 each 5    Gralise 600 mg tablet extended release 24 hr Take 2 tablets by mouth once daily at bedtime.      hydroCHLOROthiazide (Microzide) 12.5 mg capsule Take 1 capsule (12.5 mg) by mouth once daily in the morning. 90 capsule 1    ibuprofen 800 mg tablet Take 1 tablet (800 mg) by mouth every 6 hours if needed for mild pain (1 - 3). 30 tablet 1    OneTouch Ultra Test Inject 200 each under the skin 2 times a day. 200 each 3    potassium chloride CR 10 mEq ER tablet Take 2 tablets (20 mEq) by mouth once daily. Do not crush, chew, or split. 180 tablet 1    pravastatin (Pravachol) 80 mg tablet Take 1 tablet (80 mg) by mouth once daily. 90 tablet 3    topiramate (Topamax) 100 mg tablet Take 2 tablets (200 mg) by mouth once daily at bedtime.      traMADol (Ultram) 50 mg tablet Take 1 tablet (50 mg) by mouth 3 times a day.      valsartan (Diovan) 40 mg tablet Take 2 tablets (80 mg) by mouth once daily. 180 tablet 1    verapamil SR (Calan-SR) 120 mg ER tablet Take 1 tablet (120 mg) by mouth once daily at bedtime. 90 tablet 1    carvedilol (Coreg) 12.5 mg tablet Take 1 tablet (12.5 mg) by mouth 2 times a day. With food 180 tablet 1     No current facility-administered medications for this visit.   [5]   Allergies  Allergen Reactions    Latex Anaphylaxis, Shortness of breath and Hives    Aspirin Hives    Bupropion Hcl Other and Nausea Only    Pertussis  Vaccines Hives    Diphtheria, Pertussis, Tetanus Vaccine Hives    Nickel Rash, Swelling and Hives    Penicillins Hives and Swelling

## 2025-04-28 NOTE — LETTER
April 28, 2025     Letty Khan PA-C  [id number]  [facility] [address]  [facility] [id number]    Patient: Camille Romero   YOB: 1970   Date of Visit: 4/28/2025       Dear Dr. Letty Khan PA-C:    Thank you for referring Camille Romero to me for evaluation. Below are my notes for this consultation.  If you have questions, please do not hesitate to call me. I look forward to following your patient along with you.       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    Pt describes LB pain since 2022 without inciting trauma/incident. Pain radiates down anterior RLE to top of foot/toes assoc with numbness, weakness. Pain is worse with standing/walking. Pain is alleviated with sitting down. Saw Dr Mckay in past who has reportedly managed pt with NEYMAR and tramadol, but pt feels pain is uncontrolled. She had an MRI done in 2023, but pt feels pain has worsened since then and uses cane to get around.  Pt has tried Tylenol, ibuprofen, >6 w PT (last done 2024- just finished PT for R shoulder), Gralise, TCA, tramadol without sustained relief. She is interested in a stronger medication.     Pt denies new-onset numbness, weakness, bowel/bladder incontinence.  Pt denies recent infection, allergy to iodine/contrast. Patient is currently taking the following blood thinner(s): N/A    REVIEW OF SYSTEMS  Review of Systems   Constitutional: Negative.    HENT: Negative.     Eyes: Negative.    Respiratory: Negative.     Cardiovascular: Negative.     Gastrointestinal: Negative.    Endocrine: Negative.    Musculoskeletal:  Positive for back pain.   Skin: Negative.    Neurological:  Positive for weakness and numbness.   Hematological: Negative.    Psychiatric/Behavioral: Negative.         PAST MEDICAL HISTORY  Medical History[1]  Surgical History[2]  Family History[3]    CURRENT MEDICATIONS  Current Medications[4]    ALLERGIES AND DRUG REACTIONS  Allergies[5]       OBJECTIVE  Visit Vitals  BP 90/60   Pulse 72   SpO2 95%   OB Status Hysterectomy   Smoking Status Every Day       Last Recorded Pain Score (if available):           Pain Score:   8     Physical Exam  Vitals and nursing note reviewed.     General: Sitting in chair, NAD  Head: NCAT  Eyes: Sclera/conjunctiva clear, EOMI, PERRL  Nose/mouth: MMM  CV: Good distal pulses  Lungs: Good/equal chest excursion  Abdomen: Soft, ND  Ext: No cyanosis/edema  MSK: L-spine alignment: unremarkable, BL paraspinal m TTP, L-spine ROM: extension lmtd by pain with +facet-loading, pain on flexion    Neuro: AAOx3, CN grossly nl  Dermatome sensation to light touch  LEFT L1 (lower pelvis/upper thigh): WNL    RIGHT L1: WNL      LEFT L2 (upper thigh): decreased       RIGHT: L2:decreased         LEFT L3 (medial knee): decreased       RIGHT L3: decreased         LEFT L4 (superior medial malleolus): decreased       RIGHT L4: decreased         LEFT L5 (dorsal foot): decreased       RIGHT L5: decreased         LEFT S1 (lateral foot): decreased       RIGHT S1: decreased         LEFT S2 (popliteal fossa): decreased       RIGHT S2: decreased           Motor strength (effort unclear, but reportedly lmtd by back pain)  LEFT L2 (hip flexion): 4/5   RIGHT L2: 4/5  LEFT L3 (knee extension): 3/5     RIGHT L3: 3/5  LEFT L4 (dorsiflexion): 5/5     RIGHT L4: 5/5  LEFT L5 (EHL extension): 5/5     RIGHT L5: 5/5  LEFT S1 (plantarflexion): 5/5     RIGHT S1: 5/5  LEFT S2 (knee flexion): 35/5      RIGHT S2: 3/5    Special testing  DTR  "unremarkable  Seated slump test +RLE  Clonus: neg BL  Babinski: neg BL    Psych: affect nl  Skin: no rash/lesions      REVIEW OF LABORATORY DATA  I have reviewed the following lab results:  WBC   Date Value Ref Range Status   07/09/2024 9.1 4.4 - 11.3 x10*3/uL Final     RBC   Date Value Ref Range Status   07/09/2024 5.10 4.00 - 5.20 x10*6/uL Final     Hemoglobin   Date Value Ref Range Status   07/09/2024 15.7 12.0 - 16.0 g/dL Final     Hematocrit   Date Value Ref Range Status   07/09/2024 47.6 (H) 36.0 - 46.0 % Final     MCV   Date Value Ref Range Status   07/09/2024 93 80 - 100 fL Final     MCH   Date Value Ref Range Status   07/09/2024 30.8 26.0 - 34.0 pg Final     MCHC   Date Value Ref Range Status   07/09/2024 33.0 32.0 - 36.0 g/dL Final     RDW   Date Value Ref Range Status   07/09/2024 12.7 11.5 - 14.5 % Final     Platelets   Date Value Ref Range Status   07/09/2024 206 150 - 450 x10*3/uL Final     MPV   Date Value Ref Range Status   06/21/2023 10.8 7.0 - 12.6 CU Final     Sodium   Date Value Ref Range Status   07/09/2024 142 133 - 145 mmol/L Final     Potassium   Date Value Ref Range Status   07/09/2024 4.3 3.4 - 5.1 mmol/L Final     Bicarbonate   Date Value Ref Range Status   07/09/2024 23 (L) 24 - 31 mmol/L Final     Urea Nitrogen   Date Value Ref Range Status   07/09/2024 15 8 - 25 mg/dL Final     Calcium   Date Value Ref Range Status   07/09/2024 9.1 8.5 - 10.4 mg/dL Final     No results found for: \"PROTIME\", \"PTT\", \"INR\", \"FIBRINOGEN\"      REVIEW OF RADIOLOGY   I have reviewed the following:  Radiology Studies           MRI L-spine 8/31/23:  Narrative & Impression   PROCEDURE:         SPINE LUMBAR WO CONTRAST - [facility]  2010  REASON FOR EXAM: M54.13 RADICULOPATHY, CERVICOTHORACIC REGION     RESULT: MRN: [id number]  Patient Name: BETTE YUEN     STUDY:  SPINE LUMBAR WO CONTRAST; 8/31/2023 5:14 pm     INDICATION:  M54.13 RADICULOPATHY, CERVICOTHORACIC REGION;     COMPARISON:  None available.     ACCESSION " NUMBER(S):  [id number]     ORDERING CLINICIAN:  HEIDE BURGOS     TECHNIQUE:  Routine MRI of the lumbar spine was performed without contrast     FINDINGS:  RESULT:  Counting reference: Lumbosacral junction.     Alignment: Alignment is anatomic.     Bone marrow signal/fracture: No evidence of pathologic marrow infiltration.  No evidence of prior fracture.     Conus: The conus is within normal limits of signal intensity and morphology.  The conus terminates normally at L1.     Paraspinal soft tissues: Paraspinal soft tissues are unremarkable.     Lower thoracic spine: Visualized lower thoracic canal and foramina are  patent.     L1-L2: Canal and foramina are patent.     L2-L3: Disc bulge and facet hypertrophy is noted. There is mild canal  stenosis. Neural foramina are patent     L3-L4: Facet hypertrophy is noted. Canal and foramina are patent     L4-L5: Facet hypertrophy is noted. There is mild right-sided neural  foraminal narrowing     L5-S1: Canal and foramina are patent     Sacrum and iliac wings: The visualized sacrum and iliac wings are within  normal limits.  The presacral soft tissues are normal in appearance.     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    1) LBP  -Since 2022 radiating down RLE with obj numbness/weakness w/o focal deficit. BLE numbness worst in feet likely 2/2 superimposed DPN  -Refractive to >3 y conservative tx including Tylenol, ibuprofen, >6 w PT (last done 2024- just finished PT for R shoulder), Gralise, TCA, tramadol, NEYMAR  -Reviewed/discussed MRI L-spine 8/31/23: multilevel mild spondylosis and stenosis out of proportion with pt signs/sx  --Order new MRI L-spine in context of pt's worsening pain and exam  -Consider EMG based on MRI report  -In  meantime, see opioid mgmt below    2) Opioid mgmt  -2/2 chronic LBP refractive to conservative, interventional tx  -On tramadol 50 mg TID PRN with mild relief  - Pt suffers from chronic pain as detailed above that has impacted patient's ability to work and/or complete ADLs/function and maintain quality of life. Discussed that in order to assume opiate prescription safely, need to sign pain agreement.  Patient is to be is prescribed the minimal effective dose and to bee seen on a regular basis for refills and dose adjustments. Reviewed risks and mitigation factors related to opioid prescribing including common SE, withdrawal, OUD, potentially fatal respiratory depression. Reminded patient these are the reasons medication cannot be self-titrated and thus, must be taken as prescribed and not with other controlled substances unless otherwise instructed.  Patient will be subject to random drug testing- UDS today.  Opioids may impact ability to operate vehicle/heavy machinery and may affect work toxicology. Patient does not use other controlled substances or alcohol on an outpatient basis in combination with this prescription.  Discussed safe storage of opioids and importance to bring bottle to appointments for potential pill count. All questions answered and patient agrees.  reviewed/appropriate (Norco 5 mg x8 tabs on 4/14 for tooth pull). Pain agreement signed.  -Consider Belbuca    3) Smoking  Patient smokes 1/4 ppd. Spent 3 min encouraging/counseling on smoking cessation as this may increase systemic inflammatory factors which may contribute to patient's chronic pain in addition to smoking as generalized health detriments.    4) Low blood pressure  BP 90/60   Pulse 72   SpO2 95%   -Asymptomatic- denies CP, SOB, HA, dizziness, syncope  -Advised pt to call PCP for potential modification in anti-hypertensives. Pt said she will call after appointment              Discussed procedure risks/benefits in detail with  patient. Pt meets medical necessity for procedure due to failure of conservative measures. Reviewed procedural risks including bleeding, infection, nerve damage, paralysis. Also reviewed mitigating factors such as screening for infection/blood thinner use, sterile precautions, and image-guidance when applicable. All questions answered. Pt/guardian expressed understanding and choose to proceed    Today's visit involved establishment of care and a complete examination with review of records. In the context of the complexity of this patient's chronic pain diagnosis, long-term expectations and care planning discussed. Imaging studies ordered are placed do elucidate the patient's diagnosis, but also to evaluate the patient's candidacy for procedural and surgical interventions. The risks and benefits of these potential interventions are detailed as above.         Yuki Gallagher MD  Anesthesiologist & Interventional Pain Physician   [facility]  O: [phone]  F: [phone]  9:55 AM  04/28/25         [1]  Past Medical History:  Diagnosis Date   • Anxiety    • Asthma    • Coronary artery disease    • Depression    • GERD (gastroesophageal reflux disease)    • Hyperlipidemia    • Hypertension    • Myocardial infarction (Multi)    • Obesity    • Severe obesity (BMI >= 40) (Multi) 09/01/2023   • Sleep apnea    • Type 2 diabetes mellitus    [2]  Past Surgical History:  Procedure Laterality Date   • ADENOIDECTOMY     • COLONOSCOPY     • CORONARY ARTERY BYPASS GRAFT  2009    X 1 graft   • TONSILLECTOMY     [3]  Family History  Problem Relation Name Age of Onset   • Hypertension Mother     • Stroke Mother          CVA   • Diabetes Father     • Hypertension Father     • Heart disease Father     • Cancer Father     • Heart disease Father's Brother     • Other (stents) Father's Brother     • Other (defribrillator) Father's Brother     [4]  Current Outpatient Medications   Medication Sig Dispense Refill   •  albuterol (Proventil HFA) 90 mcg/actuation inhaler Inhale 2 puffs every 4 hours if needed.     • amitriptyline (Elavil) 25 mg tablet Take 2 tablets (50 mg) by mouth once daily at bedtime. 180 tablet 1   • blood-glucose meter misc Twice daily 1 each 0   • blood-glucose sensor (FreeStyle Melissa 3 Plus Sensor) device 1 each continuously. 2 each 2   • butalbital-acetaminophen-caff -40 mg tablet TAKE 1 TO 2 TABLET BY MOUTH EVERY 4 HOURS AS NEEDED F     • cyanocobalamin, vitamin B-12, (VITAMIN B-12 ORAL) 1000 MCG  as directed Orally     • docusate sodium (Colace) 100 mg capsule Take 1 capsule (100 mg) by mouth 2 times a day as needed for constipation. 60 capsule 1   • dulaglutide (Trulicity) 4.5 mg/0.5 mL pen injector Inject 4.5 mg under the skin 1 (one) time per week. 2 mL 5   • ergocalciferol (Vitamin D-2) 1.25 MG (56312 UT) capsule Take 1 capsule (1.25 mg) by mouth 1 (one) time per week. THURSDAY     • escitalopram (Lexapro) 20 mg tablet Take 1 tablet (20 mg) by mouth once daily at bedtime. 90 tablet 1   • ezetimibe (Zetia) 10 mg tablet Take 1 tablet (10 mg) by mouth once daily. 90 tablet 3   • FreeStyle Lancets 28 gauge USE AS DIRECTED EVERY 12 HOURS     • FreeStyle Melissa 3 Sensor device 1 each continuously. 2 each 5   • Gralise 600 mg tablet extended release 24 hr Take 2 tablets by mouth once daily at bedtime.     • hydroCHLOROthiazide (Microzide) 12.5 mg capsule Take 1 capsule (12.5 mg) by mouth once daily in the morning. 90 capsule 1   • ibuprofen 800 mg tablet Take 1 tablet (800 mg) by mouth every 6 hours if needed for mild pain (1 - 3). 30 tablet 1   • OneTouch Ultra Test Inject 200 each under the skin 2 times a day. 200 each 3   • potassium chloride CR 10 mEq ER tablet Take 2 tablets (20 mEq) by mouth once daily. Do not crush, chew, or split. 180 tablet 1   • pravastatin (Pravachol) 80 mg tablet Take 1 tablet (80 mg) by mouth once daily. 90 tablet 3   • topiramate (Topamax) 100 mg tablet Take 2 tablets (200  mg) by mouth once daily at bedtime.     • traMADol (Ultram) 50 mg tablet Take 1 tablet (50 mg) by mouth 3 times a day.     • valsartan (Diovan) 40 mg tablet Take 2 tablets (80 mg) by mouth once daily. 180 tablet 1   • verapamil SR (Calan-SR) 120 mg ER tablet Take 1 tablet (120 mg) by mouth once daily at bedtime. 90 tablet 1   • carvedilol (Coreg) 12.5 mg tablet Take 1 tablet (12.5 mg) by mouth 2 times a day. With food 180 tablet 1     No current facility-administered medications for this visit.   [5]  Allergies  Allergen Reactions   • Latex Anaphylaxis, Shortness of breath and Hives   • Aspirin Hives   • Bupropion Hcl Other and Nausea Only   • Pertussis Vaccines Hives   • Diphtheria, Pertussis, Tetanus Vaccine Hives   • Nickel Rash, Swelling and Hives   • Penicillins Hives and Swelling        [1]  Past Medical History:  Diagnosis Date   • Anxiety    • Asthma    • Coronary artery disease    • Depression    • GERD (gastroesophageal reflux disease)    • Hyperlipidemia    • Hypertension    • Myocardial infarction (Multi)    • Obesity    • Severe obesity (BMI >= 40) (Multi) 09/01/2023   • Sleep apnea    • Type 2 diabetes mellitus    [2]  Past Surgical History:  Procedure Laterality Date   • ADENOIDECTOMY     • COLONOSCOPY     • CORONARY ARTERY BYPASS GRAFT  2009    X 1 graft   • TONSILLECTOMY     [3]  Family History  Problem Relation Name Age of Onset   • Hypertension Mother     • Stroke Mother          CVA   • Diabetes Father     • Hypertension Father     • Heart disease Father     • Cancer Father     • Heart disease Father's Brother     • Other (stents) Father's Brother     • Other (defribrillator) Father's Brother     [4]  Current Outpatient Medications   Medication Sig Dispense Refill   • albuterol (Proventil HFA) 90 mcg/actuation inhaler Inhale 2 puffs every 4 hours if needed.     • amitriptyline (Elavil) 25 mg tablet Take 2 tablets (50 mg) by mouth once daily at bedtime. 180 tablet 1   • blood-glucose meter misc  Twice daily 1 each 0   • blood-glucose sensor (FreeStyle Melissa 3 Plus Sensor) device 1 each continuously. 2 each 2   • butalbital-acetaminophen-caff -40 mg tablet TAKE 1 TO 2 TABLET BY MOUTH EVERY 4 HOURS AS NEEDED F     • cyanocobalamin, vitamin B-12, (VITAMIN B-12 ORAL) 1000 MCG  as directed Orally     • docusate sodium (Colace) 100 mg capsule Take 1 capsule (100 mg) by mouth 2 times a day as needed for constipation. 60 capsule 1   • dulaglutide (Trulicity) 4.5 mg/0.5 mL pen injector Inject 4.5 mg under the skin 1 (one) time per week. 2 mL 5   • ergocalciferol (Vitamin D-2) 1.25 MG (09303 UT) capsule Take 1 capsule (1.25 mg) by mouth 1 (one) time per week. THURSDAY     • escitalopram (Lexapro) 20 mg tablet Take 1 tablet (20 mg) by mouth once daily at bedtime. 90 tablet 1   • ezetimibe (Zetia) 10 mg tablet Take 1 tablet (10 mg) by mouth once daily. 90 tablet 3   • FreeStyle Lancets 28 gauge USE AS DIRECTED EVERY 12 HOURS     • FreeStyle Melissa 3 Sensor device 1 each continuously. 2 each 5   • Gralise 600 mg tablet extended release 24 hr Take 2 tablets by mouth once daily at bedtime.     • hydroCHLOROthiazide (Microzide) 12.5 mg capsule Take 1 capsule (12.5 mg) by mouth once daily in the morning. 90 capsule 1   • ibuprofen 800 mg tablet Take 1 tablet (800 mg) by mouth every 6 hours if needed for mild pain (1 - 3). 30 tablet 1   • OneTouch Ultra Test Inject 200 each under the skin 2 times a day. 200 each 3   • potassium chloride CR 10 mEq ER tablet Take 2 tablets (20 mEq) by mouth once daily. Do not crush, chew, or split. 180 tablet 1   • pravastatin (Pravachol) 80 mg tablet Take 1 tablet (80 mg) by mouth once daily. 90 tablet 3   • topiramate (Topamax) 100 mg tablet Take 2 tablets (200 mg) by mouth once daily at bedtime.     • traMADol (Ultram) 50 mg tablet Take 1 tablet (50 mg) by mouth 3 times a day.     • valsartan (Diovan) 40 mg tablet Take 2 tablets (80 mg) by mouth once daily. 180 tablet 1   • verapamil  SR (Calan-SR) 120 mg ER tablet Take 1 tablet (120 mg) by mouth once daily at bedtime. 90 tablet 1   • carvedilol (Coreg) 12.5 mg tablet Take 1 tablet (12.5 mg) by mouth 2 times a day. With food 180 tablet 1     No current facility-administered medications for this visit.   [5]  Allergies  Allergen Reactions   • Latex Anaphylaxis, Shortness of breath and Hives   • Aspirin Hives   • Bupropion Hcl Other and Nausea Only   • Pertussis Vaccines Hives   • Diphtheria, Pertussis, Tetanus Vaccine Hives   • Nickel Rash, Swelling and Hives   • Penicillins Hives and Swelling

## 2025-04-29 ENCOUNTER — APPOINTMENT (OUTPATIENT)
Dept: PHYSICAL THERAPY | Facility: CLINIC | Age: 55
End: 2025-04-29
Payer: COMMERCIAL

## 2025-04-30 LAB
1OH-MIDAZOLAM UR-MCNC: NEGATIVE NG/ML
7AMINOCLONAZEPAM UR-MCNC: NEGATIVE NG/ML
A-OH ALPRAZ UR-MCNC: NEGATIVE NG/ML
A-OH-TRIAZOLAM UR-MCNC: NEGATIVE NG/ML
AMPHETAMINES UR QL: NEGATIVE NG/ML
BARBITURATES UR QL: NEGATIVE NG/ML
BZE UR QL: NEGATIVE NG/ML
CODEINE UR-MCNC: NEGATIVE NG/ML
CREAT UR-MCNC: 29.7 MG/DL
DRUG SCREEN COMMENT UR-IMP: ABNORMAL
EDDP UR-MCNC: NEGATIVE NG/ML
FENTANYL UR-MCNC: NEGATIVE NG/ML
HYDROCODONE UR-MCNC: NEGATIVE NG/ML
HYDROMORPHONE UR-MCNC: NEGATIVE NG/ML
LORAZEPAM UR-MCNC: NEGATIVE NG/ML
METHADONE UR-MCNC: NEGATIVE NG/ML
MORPHINE UR-MCNC: NEGATIVE NG/ML
NORDIAZEPAM UR-MCNC: NEGATIVE NG/ML
NORFENTANYL UR-MCNC: NEGATIVE NG/ML
NORHYDROCODONE UR CFM-MCNC: NEGATIVE NG/ML
NOROXYCODONE UR CFM-MCNC: NEGATIVE NG/ML
NORTRAMADOL UR-MCNC: 877 NG/ML
OH-ETHYLFLURAZ UR-MCNC: NEGATIVE NG/ML
OXAZEPAM UR-MCNC: NEGATIVE NG/ML
OXIDANTS UR QL: NEGATIVE MCG/ML
OXYCODONE UR CFM-MCNC: NEGATIVE NG/ML
OXYMORPHONE UR CFM-MCNC: NEGATIVE NG/ML
PCP UR QL: NEGATIVE NG/ML
PH UR: 5.5 [PH] (ref 4.5–9)
QUEST 6 ACETYLMORPHINE: NEGATIVE NG/ML
QUEST NOTES AND COMMENTS: ABNORMAL
QUEST ZOLPIDEM: NEGATIVE NG/ML
TEMAZEPAM UR-MCNC: NEGATIVE NG/ML
THC UR QL: NEGATIVE NG/ML
TRAMADOL UR-MCNC: 5387 NG/ML
ZOLPIDEM PHENYL-4-CARB UR CFM-MCNC: NEGATIVE NG/ML

## 2025-05-05 DIAGNOSIS — I10 PRIMARY HYPERTENSION: ICD-10-CM

## 2025-05-05 DIAGNOSIS — G62.9 NEUROPATHY: ICD-10-CM

## 2025-05-05 RX ORDER — CARVEDILOL 12.5 MG/1
12.5 TABLET ORAL
Qty: 180 TABLET | Refills: 0 | Status: SHIPPED | OUTPATIENT
Start: 2025-05-05

## 2025-05-05 RX ORDER — HYDROCHLOROTHIAZIDE 12.5 MG/1
12.5 CAPSULE ORAL
Qty: 90 CAPSULE | Refills: 0 | Status: SHIPPED | OUTPATIENT
Start: 2025-05-05

## 2025-05-05 RX ORDER — AMITRIPTYLINE HYDROCHLORIDE 25 MG/1
TABLET, FILM COATED ORAL
Qty: 180 TABLET | Refills: 0 | Status: SHIPPED | OUTPATIENT
Start: 2025-05-05

## 2025-05-06 ENCOUNTER — APPOINTMENT (OUTPATIENT)
Dept: PHYSICAL THERAPY | Facility: CLINIC | Age: 55
End: 2025-05-06
Payer: COMMERCIAL

## 2025-05-13 ENCOUNTER — HOSPITAL ENCOUNTER (OUTPATIENT)
Dept: RADIOLOGY | Facility: CLINIC | Age: 55
Discharge: HOME | End: 2025-05-13
Payer: COMMERCIAL

## 2025-05-13 ENCOUNTER — APPOINTMENT (OUTPATIENT)
Dept: PHYSICAL THERAPY | Facility: CLINIC | Age: 55
End: 2025-05-13
Payer: COMMERCIAL

## 2025-05-13 DIAGNOSIS — M54.16 LUMBAR RADICULOPATHY: ICD-10-CM

## 2025-05-13 PROCEDURE — 72148 MRI LUMBAR SPINE W/O DYE: CPT

## 2025-05-13 PROCEDURE — 72148 MRI LUMBAR SPINE W/O DYE: CPT | Performed by: RADIOLOGY

## 2025-05-19 ENCOUNTER — OFFICE VISIT (OUTPATIENT)
Dept: PAIN MEDICINE | Facility: CLINIC | Age: 55
End: 2025-05-19
Payer: COMMERCIAL

## 2025-05-19 VITALS
HEART RATE: 78 BPM | RESPIRATION RATE: 18 BRPM | SYSTOLIC BLOOD PRESSURE: 118 MMHG | OXYGEN SATURATION: 99 % | BODY MASS INDEX: 38.19 KG/M2 | HEIGHT: 68 IN | DIASTOLIC BLOOD PRESSURE: 64 MMHG | WEIGHT: 252 LBS

## 2025-05-19 DIAGNOSIS — Z79.891 LONG TERM (CURRENT) USE OF OPIATE ANALGESIC: ICD-10-CM

## 2025-05-19 DIAGNOSIS — M54.16 LUMBAR RADICULOPATHY: Primary | ICD-10-CM

## 2025-05-19 PROCEDURE — 3078F DIAST BP <80 MM HG: CPT | Performed by: ANESTHESIOLOGY

## 2025-05-19 PROCEDURE — 3074F SYST BP LT 130 MM HG: CPT | Performed by: ANESTHESIOLOGY

## 2025-05-19 PROCEDURE — 99214 OFFICE O/P EST MOD 30 MIN: CPT | Performed by: ANESTHESIOLOGY

## 2025-05-19 PROCEDURE — 4010F ACE/ARB THERAPY RXD/TAKEN: CPT | Performed by: ANESTHESIOLOGY

## 2025-05-19 PROCEDURE — 3044F HG A1C LEVEL LT 7.0%: CPT | Performed by: ANESTHESIOLOGY

## 2025-05-19 PROCEDURE — 3008F BODY MASS INDEX DOCD: CPT | Performed by: ANESTHESIOLOGY

## 2025-05-19 RX ORDER — TRAMADOL HYDROCHLORIDE AND ACETAMINOPHEN 37.5; 325 MG/1; MG/1
1 TABLET ORAL 4 TIMES DAILY PRN
Qty: 120 TABLET | Refills: 0 | Status: SHIPPED | OUTPATIENT
Start: 2025-05-19 | End: 2025-06-18

## 2025-05-19 ASSESSMENT — ENCOUNTER SYMPTOMS
CARDIOVASCULAR NEGATIVE: 1
BACK PAIN: 1
PSYCHIATRIC NEGATIVE: 1
NUMBNESS: 1
RESPIRATORY NEGATIVE: 1
WEAKNESS: 1
GASTROINTESTINAL NEGATIVE: 1
EYES NEGATIVE: 1
ENDOCRINE NEGATIVE: 1
CONSTITUTIONAL NEGATIVE: 1
HEMATOLOGIC/LYMPHATIC NEGATIVE: 1

## 2025-05-19 ASSESSMENT — PAIN - FUNCTIONAL ASSESSMENT: PAIN_FUNCTIONAL_ASSESSMENT: 0-10

## 2025-05-19 ASSESSMENT — PATIENT HEALTH QUESTIONNAIRE - PHQ9
SUM OF ALL RESPONSES TO PHQ9 QUESTIONS 1 AND 2: 1
1. LITTLE INTEREST OR PLEASURE IN DOING THINGS: SEVERAL DAYS
2. FEELING DOWN, DEPRESSED OR HOPELESS: NOT AT ALL
10. IF YOU CHECKED OFF ANY PROBLEMS, HOW DIFFICULT HAVE THESE PROBLEMS MADE IT FOR YOU TO DO YOUR WORK, TAKE CARE OF THINGS AT HOME, OR GET ALONG WITH OTHER PEOPLE: SOMEWHAT DIFFICULT

## 2025-05-19 ASSESSMENT — PAIN DESCRIPTION - DESCRIPTORS: DESCRIPTORS: SHARP;ACHING

## 2025-05-19 ASSESSMENT — PAIN SCALES - GENERAL
PAINLEVEL_OUTOF10: 10-WORST PAIN EVER
PAINLEVEL_OUTOF10: 10 - WORST POSSIBLE PAIN

## 2025-05-19 NOTE — PROGRESS NOTES
MEDICATION NAME: Tramadol  STRENGTH: 50 mg.  LAST FILL DATE: 2025  DATE LAST TAKEN: 2025  QUANTITY FILLED: 90  QUANTITY REMAININ  COUNT COMPLETED BY: ARIANNA FRAIRE RN and ARIANNA MARTE RN       UDS LAST COMPLETED:   CONTROLLED SUBSTANCES AGREEMENT LAST SIGNED:   ORT LAST COMPLETED:  Modified Osw[employer] disability form filled out annually.

## 2025-05-19 NOTE — PROGRESS NOTES
"PAIN MANAGEMENT FOLLOW-UP OFFICE NOTE    Date of Service: 5/19/2025    SUBJECTIVE    CHIEF COMPLAINT: LBP    HISTORY OF PRESENT ILLNESS    Camille Romero is a 54 y.o. 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 Completed MRI L-spine 5/13 and would like to review results. Claims tramadol no longer helping.     Pt denies new-onset numbness, weakness, bowel/bladder incontinence.  Pt denies recent infection, allergy to iodine/contrast. Patient is currently taking the following blood thinner(s): N/A    REVIEW OF SYSTEMS  Review of Systems   Constitutional: Negative.    HENT: Negative.     Eyes: Negative.    Respiratory: Negative.     Cardiovascular: Negative.    Gastrointestinal: Negative.    Endocrine: Negative.    Musculoskeletal:  Positive for back pain.   Skin: Negative.    Neurological:  Positive for weakness and numbness.   Hematological: Negative.    Psychiatric/Behavioral: Negative.         PAST MEDICAL HISTORY  Medical History[1]  Surgical History[2]  Family History[3]    CURRENT MEDICATIONS  Current Medications[4]    ALLERGIES AND DRUG REACTIONS  Allergies[5]       OBJECTIVE  Visit Vitals  /64   Pulse 78   Resp 18   Ht 1.727 m (5' 8\")   Wt 114 kg (252 lb)   SpO2 99%   BMI 38.32 kg/m²   OB Status Hysterectomy   Smoking Status Every Day   BSA 2.34 m²       Last Recorded Pain Score (if available):           Pain Score: 10-Worst pain ever     Physical Exam  Vitals and nursing note reviewed.       General: Sitting in chair, NAD  Head: NCAT  Eyes: Sclera/conjunctiva clear, EOMI, PERRL  Nose/mouth: MMM  CV: Good distal pulses  Lungs: Good/equal chest excursion  Abdomen: Soft, ND  Ext: No cyanosis/edema  MSK: L-spine alignment: unremarkable, BL paraspinal m TTP, L-spine ROM: extension lmtd by pain with +facet-loading, pain " on flexion    Neuro: AAOx3, CN grossly nl  Dermatome sensation to light touch  LEFT L1 (lower pelvis/upper thigh): WNL    RIGHT L1: WNL      LEFT L2 (upper thigh): decreased       RIGHT: L2:decreased         LEFT L3 (medial knee): decreased       RIGHT L3: decreased         LEFT L4 (superior medial malleolus): decreased       RIGHT L4: decreased         LEFT L5 (dorsal foot): decreased       RIGHT L5: decreased         LEFT S1 (lateral foot): decreased       RIGHT S1: decreased         LEFT S2 (popliteal fossa): decreased       RIGHT S2: decreased           Motor strength (effort unclear, but reportedly lmtd by back pain)  LEFT L2 (hip flexion): 4/5   RIGHT L2: 4/5  LEFT L3 (knee extension): 3/5     RIGHT L3: 3/5  LEFT L4 (dorsiflexion): 4/5     RIGHT L4: 4/5  LEFT L5 (EHL extension): 4/5     RIGHT L5: 4/5  LEFT S1 (plantarflexion): 5/5     RIGHT S1: 5/5  LEFT S2 (knee flexion): 35/5      RIGHT S2: 3/5    Special testing  DTR unremarkable  Seated slump test +BLE      Psych: affect nl  Skin: no rash/lesions      REVIEW OF LABORATORY DATA  I have reviewed the following lab results:  WBC   Date Value Ref Range Status   07/09/2024 9.1 4.4 - 11.3 x10*3/uL Final     RBC   Date Value Ref Range Status   07/09/2024 5.10 4.00 - 5.20 x10*6/uL Final     Hemoglobin   Date Value Ref Range Status   07/09/2024 15.7 12.0 - 16.0 g/dL Final     Hematocrit   Date Value Ref Range Status   07/09/2024 47.6 (H) 36.0 - 46.0 % Final     MCV   Date Value Ref Range Status   07/09/2024 93 80 - 100 fL Final     MCH   Date Value Ref Range Status   07/09/2024 30.8 26.0 - 34.0 pg Final     MCHC   Date Value Ref Range Status   07/09/2024 33.0 32.0 - 36.0 g/dL Final     RDW   Date Value Ref Range Status   07/09/2024 12.7 11.5 - 14.5 % Final     Platelets   Date Value Ref Range Status   07/09/2024 206 150 - 450 x10*3/uL Final     MPV   Date Value Ref Range Status   06/21/2023 10.8 7.0 - 12.6 CU Final     Sodium   Date Value Ref Range Status  "  07/09/2024 142 133 - 145 mmol/L Final     Potassium   Date Value Ref Range Status   07/09/2024 4.3 3.4 - 5.1 mmol/L Final     Bicarbonate   Date Value Ref Range Status   07/09/2024 23 (L) 24 - 31 mmol/L Final     Urea Nitrogen   Date Value Ref Range Status   07/09/2024 15 8 - 25 mg/dL Final     Calcium   Date Value Ref Range Status   07/09/2024 9.1 8.5 - 10.4 mg/dL Final     No results found for: \"PROTIME\", \"PTT\", \"INR\", \"FIBRINOGEN\"      REVIEW OF RADIOLOGY   I have reviewed the following:  Radiology Studies           MRI L-spine 5/13/25     There is a transitional morphology. For the purpose of this  examination the last well-formed disc space is labeled as L5/S1 with  presumed  sacralized L5 vertebral body. Confirmation of lumbar  numbers before surgical intervention is recommended.      Alignment: The vertebral alignment is maintained.      Vertebrae/Intervertebral Discs: The vertebral bodies demonstrate  expected height. The marrow signal is within normal limits.      Conus: The lower thoracic cord appears unremarkable. The conus  terminates at L1.      T12-L1: Minimal disc space narrowing and endplate spondylosis. Mild  disc bulge and facet hypertrophy without significant central canal or  foraminal stenosis.      L1-2: Minimal disc space narrowing and endplate spondylosis. Mild  disc bulge and facet hypertrophy without significant central canal or  foraminal stenosis.      L2-3: Mild disc space narrowing and endplate spondylosis. Mild disc  bulge and facet hypertrophy without significant central canal  stenosis. Mild right and mild left foraminal stenosis.      L3-4: Mild disc space narrowing and endplate spondylosis. Mild  hypertrophic facet arthropathy with 2 mm grade 1 anterolisthesis and  disc bulge without significant central canal stenosis. Mild right and  mild left foraminal stenosis.      L4-5: Minimal disc space narrowing and endplate spondylosis. Moderate  hypertrophic facet arthropathy with 3 mm " grade 1 anterolisthesis and  disc uncovering without significant central canal stenosis. Moderate  right and mild left foraminal stenosis. Pedicles edema greater on the  left, consistent with stress reaction.      L5-S1: There is no significant central canal or neural foraminal  stenosis.      IMPRESSION:  Transitional morphology.      L4/L5: Facet arthropathy with grade 1 anterolisthesis. Moderate right  foraminal stenosis. Pedicle stress reaction.        ASSESSMENT & PLAN  Camille Romero is a 54 y.o. 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 BLE numbness worst in feet likely 2/2 superimposed DPN  -Refractive to >3 y conservative tx including Tylenol, ibuprofen, >6 w PT (last done 2024- just finished PT for R shoulder), Gralise, TCA, tramadol, NEYMAR  -Reviewed/discussed MRI L-spine 5/13/25: multilevel spondylosis featuring sacralized L5, G1 L4-5 listhesis with mod R NFS/pedicle stress rxn  -Worsening exam out of proportion with MRI; EMG BLE, referral to Dr Barber for surgical opinion  -See opioid mgmt below    2) Opioid mgmt  -2/2 chronic LBP refractive to conservative, interventional tx  -On tramadol 50 mg TID PRN with mild relief  - reviewed/appropriate. Reviewed UDS 4/28: OK. No sign of aberrant behavior  -Ultracet 37.5 QID PRN x30 d  -RTC 1 mo  -Consider Belbuca    3) Smoking  Patient smokes 1/4 ppd. Spent 3 min encouraging/counseling on smoking cessation 5/19 as this may increase systemic inflammatory factors which may contribute to patient's chronic pain in addition to smoking as generalized health detriments.                  Today's visit involved establishment of care and a complete examination with review of records. In the context of the complexity of this patient's chronic pain diagnosis, long-term  expectations and care planning discussed. Imaging studies ordered are placed do elucidate the patient's diagnosis, but also to evaluate the patient's candidacy for procedural and surgical interventions. The risks and benefits of these potential interventions are detailed as above.         Yuki Gallagher MD  Anesthesiologist & Interventional Pain Physician   [facility] [place]  O: [phone]  F: [phone]  9:02 AM  05/19/25         [1]   Past Medical History:  Diagnosis Date    Anxiety     Asthma     Coronary artery disease     Depression     GERD (gastroesophageal reflux disease)     Hyperlipidemia     Hypertension     Myocardial infarction (Multi)     Obesity     Severe obesity (BMI >= 40) (Multi) 09/01/2023    Sleep apnea     Type 2 diabetes mellitus    [2]   Past Surgical History:  Procedure Laterality Date    ADENOIDECTOMY      COLONOSCOPY      CORONARY ARTERY BYPASS GRAFT  2009    X 1 graft    TONSILLECTOMY     [3]   Family History  Problem Relation Name Age of Onset    Hypertension Mother      Stroke Mother          CVA    Diabetes Father      Hypertension Father      Heart disease Father      Cancer Father      Heart disease Father's Brother      Other (stents) Father's Brother      Other (defribrillator) Father's Brother     [4]   Current Outpatient Medications   Medication Sig Dispense Refill    albuterol (Proventil HFA) 90 mcg/actuation inhaler Inhale 2 puffs every 4 hours if needed.      amitriptyline (Elavil) 25 mg tablet TAKE TWO TABLETS BY MOUTH once DAILYat bedtime 180 tablet 0    blood-glucose meter misc Twice daily 1 each 0    blood-glucose sensor (FreeStyle Melissa 3 Plus Sensor) device 1 each continuously. 2 each 2    butalbital-acetaminophen-caff -40 mg tablet TAKE 1 TO 2 TABLET BY MOUTH EVERY 4 HOURS AS NEEDED F      carvedilol (Coreg) 12.5 mg tablet TAKE ONE TABLET BY MOUTH TWO TIMES A DAY with food 180 tablet 0    cyanocobalamin, vitamin B-12, (VITAMIN B-12 ORAL) 1000 MCG  as  directed Orally      docusate sodium (Colace) 100 mg capsule Take 1 capsule (100 mg) by mouth 2 times a day as needed for constipation. 60 capsule 1    dulaglutide (Trulicity) 4.5 mg/0.5 mL pen injector Inject 4.5 mg under the skin 1 (one) time per week. 2 mL 5    ergocalciferol (Vitamin D-2) 1.25 MG (73550 UT) capsule Take 1 capsule (1.25 mg) by mouth 1 (one) time per week. THURSDAY      escitalopram (Lexapro) 20 mg tablet Take 1 tablet (20 mg) by mouth once daily at bedtime. 90 tablet 1    ezetimibe (Zetia) 10 mg tablet Take 1 tablet (10 mg) by mouth once daily. 90 tablet 3    FreeStyle Lancets 28 gauge USE AS DIRECTED EVERY 12 HOURS      FreeStyle Melissa 3 Sensor device 1 each continuously. 2 each 5    Gralise 600 mg tablet extended release 24 hr Take 2 tablets by mouth once daily at bedtime.      hydroCHLOROthiazide (Microzide) 12.5 mg capsule TAKE ONE CAPSULE BY MOUTH EVERY MORNING 90 capsule 0    ibuprofen 800 mg tablet Take 1 tablet (800 mg) by mouth every 6 hours if needed for mild pain (1 - 3). 30 tablet 1    OneTouch Ultra Test Inject 200 each under the skin 2 times a day. 200 each 3    potassium chloride CR 10 mEq ER tablet Take 2 tablets (20 mEq) by mouth once daily. Do not crush, chew, or split. 180 tablet 1    pravastatin (Pravachol) 80 mg tablet Take 1 tablet (80 mg) by mouth once daily. 90 tablet 3    topiramate (Topamax) 100 mg tablet Take 2 tablets (200 mg) by mouth once daily at bedtime.      traMADol (Ultram) 50 mg tablet Take 1 tablet (50 mg) by mouth 3 times a day.      valsartan (Diovan) 40 mg tablet Take 2 tablets (80 mg) by mouth once daily. 180 tablet 1    verapamil SR (Calan-SR) 120 mg ER tablet Take 1 tablet (120 mg) by mouth once daily at bedtime. 90 tablet 1     No current facility-administered medications for this visit.   [5]   Allergies  Allergen Reactions    Latex Anaphylaxis, Shortness of breath and Hives    Aspirin Hives    Bupropion Hcl Other and Nausea Only    Pertussis Vaccines  Hives    Diphtheria, Pertussis, Tetanus Vaccine Hives    Nickel Rash, Swelling and Hives    Penicillins Hives and Swelling

## 2025-05-20 ENCOUNTER — OFFICE VISIT (OUTPATIENT)
Dept: PRIMARY CARE | Facility: CLINIC | Age: 55
End: 2025-05-20
Payer: COMMERCIAL

## 2025-05-20 ENCOUNTER — APPOINTMENT (OUTPATIENT)
Dept: PHYSICAL THERAPY | Facility: CLINIC | Age: 55
End: 2025-05-20
Payer: COMMERCIAL

## 2025-05-20 VITALS
OXYGEN SATURATION: 97 % | WEIGHT: 243 LBS | SYSTOLIC BLOOD PRESSURE: 124 MMHG | TEMPERATURE: 97.1 F | HEART RATE: 70 BPM | HEIGHT: 68 IN | BODY MASS INDEX: 36.83 KG/M2 | DIASTOLIC BLOOD PRESSURE: 72 MMHG

## 2025-05-20 DIAGNOSIS — E78.5 DYSLIPIDEMIA: ICD-10-CM

## 2025-05-20 DIAGNOSIS — E55.9 VITAMIN D DEFICIENCY: ICD-10-CM

## 2025-05-20 DIAGNOSIS — E11.9 TYPE 2 DIABETES MELLITUS WITHOUT COMPLICATION, WITHOUT LONG-TERM CURRENT USE OF INSULIN: ICD-10-CM

## 2025-05-20 DIAGNOSIS — Z13.228 SCREENING FOR METABOLIC DISORDER: ICD-10-CM

## 2025-05-20 DIAGNOSIS — I10 BENIGN ESSENTIAL HYPERTENSION: Primary | ICD-10-CM

## 2025-05-20 LAB — POC HEMOGLOBIN A1C: 5.5 % (ref 4.2–6.5)

## 2025-05-20 PROCEDURE — 3078F DIAST BP <80 MM HG: CPT | Performed by: INTERNAL MEDICINE

## 2025-05-20 PROCEDURE — 4010F ACE/ARB THERAPY RXD/TAKEN: CPT | Performed by: INTERNAL MEDICINE

## 2025-05-20 PROCEDURE — 83036 HEMOGLOBIN GLYCOSYLATED A1C: CPT | Performed by: INTERNAL MEDICINE

## 2025-05-20 PROCEDURE — 3008F BODY MASS INDEX DOCD: CPT | Performed by: INTERNAL MEDICINE

## 2025-05-20 PROCEDURE — 3044F HG A1C LEVEL LT 7.0%: CPT | Performed by: INTERNAL MEDICINE

## 2025-05-20 PROCEDURE — 4004F PT TOBACCO SCREEN RCVD TLK: CPT | Performed by: INTERNAL MEDICINE

## 2025-05-20 PROCEDURE — 3074F SYST BP LT 130 MM HG: CPT | Performed by: INTERNAL MEDICINE

## 2025-05-20 PROCEDURE — 99214 OFFICE O/P EST MOD 30 MIN: CPT | Performed by: INTERNAL MEDICINE

## 2025-05-20 RX ORDER — CALCIUM CITRATE/VITAMIN D3 200MG-6.25
1 TABLET ORAL 2 TIMES DAILY
Qty: 200 EACH | Refills: 2 | Status: SHIPPED | OUTPATIENT
Start: 2025-05-20

## 2025-05-20 RX ORDER — INSULIN PUMP SYRINGE, 3 ML
EACH MISCELLANEOUS
Qty: 1 EACH | Refills: 0 | Status: SHIPPED | OUTPATIENT
Start: 2025-05-20 | End: 2026-05-20

## 2025-05-20 ASSESSMENT — ENCOUNTER SYMPTOMS
BACK PAIN: 1
TREMORS: 0
WHEEZING: 0
NUMBNESS: 0
POLYPHAGIA: 0
SHORTNESS OF BREATH: 0
OCCASIONAL FEELINGS OF UNSTEADINESS: 0
VOMITING: 0
FREQUENCY: 0
BLOOD IN STOOL: 0
DYSURIA: 0
ABDOMINAL PAIN: 0
DEPRESSION: 0
FATIGUE: 0
POLYDIPSIA: 0
TROUBLE SWALLOWING: 0
COUGH: 0
SEIZURES: 0
NAUSEA: 0
SINUS PRESSURE: 0
LOSS OF SENSATION IN FEET: 0
MYALGIAS: 0
CHILLS: 0
PALPITATIONS: 0
ARTHRALGIAS: 1

## 2025-05-20 ASSESSMENT — COLUMBIA-SUICIDE SEVERITY RATING SCALE - C-SSRS
1. IN THE PAST MONTH, HAVE YOU WISHED YOU WERE DEAD OR WISHED YOU COULD GO TO SLEEP AND NOT WAKE UP?: NO
6. HAVE YOU EVER DONE ANYTHING, STARTED TO DO ANYTHING, OR PREPARED TO DO ANYTHING TO END YOUR LIFE?: NO
2. HAVE YOU ACTUALLY HAD ANY THOUGHTS OF KILLING YOURSELF?: NO

## 2025-05-20 ASSESSMENT — PAIN SCALES - GENERAL: PAINLEVEL_OUTOF10: 9

## 2025-05-20 NOTE — PROGRESS NOTES
Subjective   Patient ID: Camille Romero is a 54 y.o. female who presents for 3 month f/u.    HPI     Has history of hypertension, hyperlipidemia, type 2 diabetes mellitus, depression, coronary artery disease, migraine headaches.         History of coronary artery disease status post CABG in 2013-follows with cardiologist Dr Bejarano. Was advised to follow up as needed.         History of diabetes mellitus-Denied any hypoglycemic episodes. Has free style Melissa.  Ozempic was not covered,            History of hyperlipidemia-compliant with medications.         H/O Migraine headaches- Taking Amitriptyline for migraine headaches.         Takes Gabapentin for neuropathy and migraine headaches.         H/O Tobacco dependence- smokes pack every 2-3 days. Started smoking when she was 15 years old.         Seeing Dr Smith for left knee pain-    Seeing Dr Gallagher for low back pain,     . Polycythemia: Seeing Hematology  Has been felt to be multifactorial previous dehydration, current tobacco use and hx sleep apnea. Patient has been worked up for sleep apnea. Not on CPAP.   Iron studies completed and found to be within normal limits thus hemochromatosis is not a concern.  JAK2 mutation negative      Left thumb trigger finger, seeing pain management    Complaining of bilateral shoulder plain, worse on left side compared to right  Range of motion reduced    Review of Systems   Constitutional:  Negative for chills and fatigue.   HENT:  Negative for postnasal drip, sinus pressure and trouble swallowing.    Respiratory:  Negative for cough, shortness of breath and wheezing.    Cardiovascular:  Negative for chest pain, palpitations and leg swelling.   Gastrointestinal:  Negative for abdominal pain, blood in stool, nausea and vomiting.   Endocrine: Negative for polydipsia, polyphagia and polyuria.   Genitourinary:  Negative for dysuria and frequency.   Musculoskeletal:  Positive for arthralgias and back pain. Negative for  "myalgias.   Skin:  Negative for rash.   Neurological:  Negative for tremors, seizures and numbness.   Psychiatric/Behavioral:  Negative for behavioral problems.        Objective   /72 (BP Location: Left arm, Patient Position: Sitting, BP Cuff Size: Large adult)   Pulse 70   Temp 36.2 °C (97.1 °F) (Temporal)   Ht 1.727 m (5' 8\")   Wt 110 kg (243 lb)   SpO2 97%   BMI 36.95 kg/m²     Physical Exam  Constitutional:       General: She is not in acute distress.     Appearance: Normal appearance.   HENT:      Head: Normocephalic and atraumatic.   Eyes:      Extraocular Movements: Extraocular movements intact.      Pupils: Pupils are equal, round, and reactive to light.   Cardiovascular:      Rate and Rhythm: Normal rate and regular rhythm.      Heart sounds: Normal heart sounds. No murmur heard.     No friction rub.   Pulmonary:      Effort: Pulmonary effort is normal.      Breath sounds: Normal breath sounds. No wheezing or rales.   Abdominal:      General: Bowel sounds are normal.      Palpations: Abdomen is soft.      Tenderness: There is no abdominal tenderness. There is no guarding.   Musculoskeletal:         General: Normal range of motion.      Right lower leg: No edema.      Left lower leg: No edema.      Comments: Uses cane to walk   Neurological:      Mental Status: She is alert and oriented to person, place, and time.      Cranial Nerves: No cranial nerve deficit.   Psychiatric:         Mood and Affect: Mood normal.         Assessment/Plan       Camille was seen today for 3 month f/u.  Diagnoses and all orders for this visit:  Benign essential hypertension (Primary)  -     CBC and Auto Differential; Future  -     Comprehensive Metabolic Panel; Future  -     CBC and Auto Differential  -     Comprehensive Metabolic Panel  Type 2 diabetes mellitus without complication, without long-term current use of insulin  -     POCT glycosylated hemoglobin (Hb A1C) manually resulted  -     Albumin-Creatinine Ratio, " "Urine Random; Future  -     Blood glucose monitoring (True Metrix Glucose Meter) meter; daily  -     blood sugar diagnostic (True Metrix Glucose Test Strip); 1 each 2 times a day.  -     Albumin-Creatinine Ratio, Urine Random  Dyslipidemia  -     Lipid Panel; Future  -     Lipid Panel  Screening for metabolic disorder  -     TSH with reflex to Free T4 if abnormal; Future  -     TSH with reflex to Free T4 if abnormal  Vitamin D deficiency  -     Vitamin D 25-Hydroxy,Total (for eval of Vitamin D levels); Future  -     Vitamin D 25-Hydroxy,Total (for eval of Vitamin D levels)       Reviewed home blood sugar readings, stated freestyle saba showing multiple blood sugar readings in 70s and when she checks with her glucometer blood sugars are ranging in   No symptoms of hypoglycemia   advised to continue with low-carb diet, limit daily calories to less than 1800 a day denied any hypoglycemic episodes      Lab Results   Component Value Date    HGBA1C 5.5 05/20/2025      Lab Results   Component Value Date    WBC 9.1 07/09/2024    HGB 15.7 07/09/2024    HCT 47.6 (H) 07/09/2024    MCV 93 07/09/2024     07/09/2024     Lab Results   Component Value Date    GLUCOSE 142 (H) 07/09/2024    CALCIUM 9.1 07/09/2024     07/09/2024    K 4.3 07/09/2024    CO2 23 (L) 07/09/2024     07/09/2024    BUN 15 07/09/2024    CREATININE 0.90 07/09/2024     Lab Results   Component Value Date    CHOL 116 (L) 07/09/2024    CHOL 113 (L) 02/18/2023    CHOL 110 (L) 02/01/2022     Lab Results   Component Value Date    HDL 30.0 (L) 07/09/2024    HDL 25 (L) 02/18/2023    HDL 23 (L) 02/01/2022     Lab Results   Component Value Date    LDLCALC 56 (L) 07/09/2024    LDLCALC 64 (L) 02/18/2023    LDLCALC 59 (L) 02/01/2022     Lab Results   Component Value Date    TRIG 149 07/09/2024    TRIG 118 02/18/2023    TRIG 138 02/01/2022     No components found for: \"CHOLHDL\"      Current Outpatient Medications   Medication Instructions    " albuterol (Proventil HFA) 90 mcg/actuation inhaler 2 puffs, Every 4 hours PRN    amitriptyline (Elavil) 25 mg tablet TAKE TWO TABLETS BY MOUTH once DAILYat bedtime    Blood glucose monitoring (True Metrix Glucose Meter) meter daily    blood sugar diagnostic (True Metrix Glucose Test Strip) 1 each, miscellaneous, 2 times daily    blood-glucose meter misc Twice daily    blood-glucose sensor (FreeStyle Melissa 3 Plus Sensor) device 1 each, miscellaneous, Continuous    butalbital-acetaminophen-caff -40 mg tablet TAKE 1 TO 2 TABLET BY MOUTH EVERY 4 HOURS AS NEEDED F    carvedilol (COREG) 12.5 mg, oral, 2 times daily after meals    cyanocobalamin, vitamin B-12, (VITAMIN B-12 ORAL) 1000 MCG  as directed Orally    docusate sodium (COLACE) 100 mg, oral, 2 times daily PRN    ergocalciferol (Vitamin D-2) 1.25 MG (58561 UT) capsule 1 capsule, Once Weekly    escitalopram (LEXAPRO) 20 mg, oral, Nightly    ezetimibe (ZETIA) 10 mg, oral, Daily    FreeStyle Lancets 28 gauge USE AS DIRECTED EVERY 12 HOURS    FreeStyle Melissa 3 Sensor device 1 each, miscellaneous, Continuous    Gralise 600 mg tablet extended release 24 hr 2 tablets, Nightly    hydroCHLOROthiazide (MICROZIDE) 12.5 mg, oral, Daily before breakfast    ibuprofen 800 mg, oral, Every 6 hours PRN    potassium chloride CR 10 mEq ER tablet 20 mEq, oral, Daily, Do not crush, chew, or split.    pravastatin (PRAVACHOL) 80 mg, oral, Daily    topiramate (TOPAMAX) 200 mg, Nightly    traMADoL-acetaminophen (Ultracet) 37.5-325 mg tablet 1 tablet, oral, 4 times daily PRN    Trulicity 4.5 mg, subcutaneous, Weekly    valsartan (DIOVAN) 80 mg, oral, Daily    verapamil SR (CALAN-SR) 120 mg, oral, Nightly

## 2025-05-27 ENCOUNTER — APPOINTMENT (OUTPATIENT)
Dept: PHYSICAL THERAPY | Facility: CLINIC | Age: 55
End: 2025-05-27
Payer: COMMERCIAL

## 2025-06-02 ENCOUNTER — OFFICE VISIT (OUTPATIENT)
Dept: ORTHOPEDIC SURGERY | Facility: CLINIC | Age: 55
End: 2025-06-02
Payer: COMMERCIAL

## 2025-06-02 VITALS — WEIGHT: 246 LBS | BODY MASS INDEX: 37.28 KG/M2 | HEIGHT: 68 IN

## 2025-06-02 DIAGNOSIS — M54.16 LUMBAR RADICULOPATHY: ICD-10-CM

## 2025-06-02 PROCEDURE — 4004F PT TOBACCO SCREEN RCVD TLK: CPT | Performed by: ORTHOPAEDIC SURGERY

## 2025-06-02 PROCEDURE — 3008F BODY MASS INDEX DOCD: CPT | Performed by: ORTHOPAEDIC SURGERY

## 2025-06-02 PROCEDURE — 4010F ACE/ARB THERAPY RXD/TAKEN: CPT | Performed by: ORTHOPAEDIC SURGERY

## 2025-06-02 PROCEDURE — 99214 OFFICE O/P EST MOD 30 MIN: CPT | Performed by: ORTHOPAEDIC SURGERY

## 2025-06-02 PROCEDURE — 3044F HG A1C LEVEL LT 7.0%: CPT | Performed by: ORTHOPAEDIC SURGERY

## 2025-06-02 PROCEDURE — 99204 OFFICE O/P NEW MOD 45 MIN: CPT | Performed by: ORTHOPAEDIC SURGERY

## 2025-06-02 ASSESSMENT — ENCOUNTER SYMPTOMS
DEPRESSION: 0
LOSS OF SENSATION IN FEET: 1
OCCASIONAL FEELINGS OF UNSTEADINESS: 1

## 2025-06-02 ASSESSMENT — PAIN - FUNCTIONAL ASSESSMENT: PAIN_FUNCTIONAL_ASSESSMENT: 0-10

## 2025-06-02 ASSESSMENT — PAIN SCALES - GENERAL: PAINLEVEL_OUTOF10: 10 - WORST POSSIBLE PAIN

## 2025-06-02 NOTE — LETTER
June 2, 2025     Mira Ruiz MD  [address]  [facility], [address]  Formerly [facility] [address]    Patient: Camille Romero   YOB: 1970   Date of Visit: 6/2/2025       Dear Dr. Mira Ruiz MD:    Thank you for referring Camille Romero to me for evaluation. Below are my notes for this consultation.  If you have questions, please do not hesitate to call me. I look forward to following your patient along with you.       Sincerely,     Luisito Barber MD      CC: Yuki Gallagher MD  ______________________________________________________________________________________    54-year-old female referred by Dr. Gallagher for left groin and thigh pain, some pain that radiates below the knee as well.  She has had the symptoms for several years, progressively worsening.  Symptoms are worse with activity, improved somewhat with rest.  She has been treated extensively with physical therapy and multiple epidural injections, multiple medications.  She is on mild opioid therapy as well.    She denies bowel or bladder incontinence.    She smokes about a half a pack of cigarettes a day.    Medical history is significant for coronary artery disease, she has had previous bypass surgery.  She also has a history of obesity.    On exam she is well-appearing and in no distress.  BMI 37.4.  Antalgic gait using a crutch.  No focal neurologic deficits.  She has reproducible daily pain with flexion and internal rotation of her left hip joint.    I personally reviewed MRI of her lumbar spine.  This shows trace anterolisthesis at L4-5.  There is no significant central or foraminal stenosis.  No fluid in the facet joints to signify dynamic instability.  She does have hip x-rays from 2023 that demonstrate moderate to severe degenerative change bilaterally.    54-year-old female with left leg pain that is related mostly to hip arthritis rather than lumbar spinal stenosis.    I explained that she should attempt  an intra-articular steroid injection, I referred her back to Dr. Gallagher to attempt this for both diagnostic and hopefully therapeutic purposes.  I also had her schedule an appointment with one of my hip replacement partners.    I discussed the importance of maintaining a BMI less than 40 as well as smoking cessation as it pertains to possible hip replacement surgery.    From my standpoint lumbar spine surgery would not be helpful at this time.    *This note was dictated using speech recognition software and was not corrected for spelling or grammatical errors*    Medical History[1]    Current Medications[2]     Social History     Socioeconomic History   • Marital status:      Spouse name: Not on file   • Number of children: Not on file   • Years of education: Not on file   • Highest education level: Not on file   Occupational History   • Not on file   Tobacco Use   • Smoking status: Every Day     Current packs/day: 0.50     Average packs/day: 0.5 packs/day for 39.4 years (19.7 ttl pk-yrs)     Types: Cigarettes     Start date: 1986     Passive exposure: Current   • Smokeless tobacco: Never   Vaping Use   • Vaping status: Never Used   Substance and Sexual Activity   • Alcohol use: Yes     Alcohol/week: 4.0 standard drinks of alcohol     Types: 4 Shots of liquor per week   • Drug use: Never   • Sexual activity: Yes     Partners: Male   Other Topics Concern   • Not on file   Social History Narrative   • Not on file     Social Drivers of Health     Financial Resource Strain: Low Risk  (5/20/2024)    Overall Financial Resource Strain (CARDIA)    • Difficulty of Paying Living Expenses: Not very hard   Food Insecurity: No Food Insecurity (5/21/2024)    Hunger Vital Sign    • Worried About Running Out of Food in the Last Year: Never true    • Ran Out of Food in the Last Year: Never true   Transportation Needs: No Transportation Needs (5/20/2024)    PRAPARE - Transportation    • Lack of Transportation (Medical): No     • Lack of Transportation (Non-Medical): No   Physical Activity: Inactive (5/21/2024)    Exercise Vital Sign    • Days of Exercise per Week: 0 days    • Minutes of Exercise per Session: 0 min   Stress: No Stress Concern Present (5/21/2024)    [employer] - Occupational Stress Questionnaire    • Feeling of Stress : Not at all   Social Connections: Unknown (5/21/2024)    Social Connection and Isolation Panel [NHANES]    • Frequency of Communication with Friends and Family: More than three times a week    • Frequency of Social Gatherings with Friends and Family: More than three times a week    • Attends [employer]an Services: Patient declined    • Active Member of Clubs or Organizations: Patient declined    • Attends Club or Organization Meetings: Patient declined    • Marital Status: Patient declined   Intimate Partner Violence: Not At Risk (5/21/2024)    Humiliation, Afraid, Rape, and Kick questionnaire    • Fear of Current or Ex-Partner: No    • Emotionally Abused: No    • Physically Abused: No    • Sexually Abused: No   Housing Stability: Low Risk  (5/20/2024)    Housing Stability Vital Sign    • Unable to Pay for Housing in the Last Year: No    • Number of Places Lived in the Last Year: 1    • Unstable Housing in the Last Year: No       Luisito Barber MD  Director, [facility]   Department of Orthopaedic Surgery  [address] [place] [address]  [phone]       [1]  Past Medical History:  Diagnosis Date   • Anxiety    • Asthma    • Coronary artery disease    • Depression    • GERD (gastroesophageal reflux disease)    • Hyperlipidemia    • Hypertension    • Myocardial infarction (Multi)    • Obesity    • Severe obesity (BMI >= 40) (Multi) 09/01/2023   • Sleep apnea    • Type 2 diabetes mellitus    [2]    Current Outpatient Medications:   •  albuterol (Proventil HFA) 90 mcg/actuation  inhaler, Inhale 2 puffs every 4 hours if needed., Disp: , Rfl:   •  amitriptyline (Elavil) 25 mg tablet, TAKE TWO TABLETS BY MOUTH once DAILYat bedtime, Disp: 180 tablet, Rfl: 0  •  Blood glucose monitoring (True Metrix Glucose Meter) meter, daily, Disp: 1 each, Rfl: 0  •  blood sugar diagnostic (True Metrix Glucose Test Strip), 1 each 2 times a day., Disp: 200 each, Rfl: 2  •  blood-glucose meter misc, Twice daily, Disp: 1 each, Rfl: 0  •  blood-glucose sensor (FreeStyle Melissa 3 Plus Sensor) device, 1 each continuously., Disp: 2 each, Rfl: 2  •  butalbital-acetaminophen-caff -40 mg tablet, TAKE 1 TO 2 TABLET BY MOUTH EVERY 4 HOURS AS NEEDED F, Disp: , Rfl:   •  carvedilol (Coreg) 12.5 mg tablet, TAKE ONE TABLET BY MOUTH TWO TIMES A DAY with food, Disp: 180 tablet, Rfl: 0  •  cyanocobalamin, vitamin B-12, (VITAMIN B-12 ORAL), 1000 MCG  as directed Orally, Disp: , Rfl:   •  docusate sodium (Colace) 100 mg capsule, Take 1 capsule (100 mg) by mouth 2 times a day as needed for constipation., Disp: 60 capsule, Rfl: 1  •  dulaglutide (Trulicity) 4.5 mg/0.5 mL pen injector, Inject 4.5 mg under the skin 1 (one) time per week., Disp: 2 mL, Rfl: 5  •  ergocalciferol (Vitamin D-2) 1.25 MG (41358 UT) capsule, Take 1 capsule (1.25 mg) by mouth 1 (one) time per week. THURSDAY, Disp: , Rfl:   •  escitalopram (Lexapro) 20 mg tablet, Take 1 tablet (20 mg) by mouth once daily at bedtime., Disp: 90 tablet, Rfl: 1  •  ezetimibe (Zetia) 10 mg tablet, Take 1 tablet (10 mg) by mouth once daily., Disp: 90 tablet, Rfl: 3  •  FreeStyle Lancets 28 gauge, USE AS DIRECTED EVERY 12 HOURS, Disp: , Rfl:   •  FreeStyle Melissa 3 Sensor device, 1 each continuously., Disp: 2 each, Rfl: 5  •  Gralise 600 mg tablet extended release 24 hr, Take 2 tablets by mouth once daily at bedtime., Disp: , Rfl:   •  hydroCHLOROthiazide (Microzide) 12.5 mg capsule, TAKE ONE CAPSULE BY MOUTH EVERY MORNING, Disp: 90 capsule, Rfl: 0  •  ibuprofen 800 mg tablet,  Take 1 tablet (800 mg) by mouth every 6 hours if needed for mild pain (1 - 3)., Disp: 30 tablet, Rfl: 1  •  potassium chloride CR 10 mEq ER tablet, Take 2 tablets (20 mEq) by mouth once daily. Do not crush, chew, or split., Disp: 180 tablet, Rfl: 1  •  pravastatin (Pravachol) 80 mg tablet, Take 1 tablet (80 mg) by mouth once daily., Disp: 90 tablet, Rfl: 3  •  topiramate (Topamax) 100 mg tablet, Take 2 tablets (200 mg) by mouth once daily at bedtime., Disp: , Rfl:   •  traMADoL-acetaminophen (Ultracet) 37.5-325 mg tablet, Take 1 tablet by mouth 4 times a day as needed for severe pain (7 - 10)., Disp: 120 tablet, Rfl: 0  •  valsartan (Diovan) 40 mg tablet, Take 2 tablets (80 mg) by mouth once daily., Disp: 180 tablet, Rfl: 1  •  verapamil SR (Calan-SR) 120 mg ER tablet, Take 1 tablet (120 mg) by mouth once daily at bedtime., Disp: 90 tablet, Rfl: 1

## 2025-06-02 NOTE — PROGRESS NOTES
54-year-old female referred by Dr. Gallagher for left groin and thigh pain, some pain that radiates below the knee as well.  She has had the symptoms for several years, progressively worsening.  Symptoms are worse with activity, improved somewhat with rest.  She has been treated extensively with physical therapy and multiple epidural injections, multiple medications.  She is on mild opioid therapy as well.    She denies bowel or bladder incontinence.    She smokes about a half a pack of cigarettes a day.    Medical history is significant for coronary artery disease, she has had previous bypass surgery.  She also has a history of obesity.    On exam she is well-appearing and in no distress.  BMI 37.4.  Antalgic gait using a crutch.  No focal neurologic deficits.  She has reproducible daily pain with flexion and internal rotation of her left hip joint.    I personally reviewed MRI of her lumbar spine.  This shows trace anterolisthesis at L4-5.  There is no significant central or foraminal stenosis.  No fluid in the facet joints to signify dynamic instability.  She does have hip x-rays from 2023 that demonstrate moderate to severe degenerative change bilaterally.    54-year-old female with left leg pain that is related mostly to hip arthritis rather than lumbar spinal stenosis.    I explained that she should attempt an intra-articular steroid injection, I referred her back to Dr. Gallagher to attempt this for both diagnostic and hopefully therapeutic purposes.  I also had her schedule an appointment with one of my hip replacement partners.    I discussed the importance of maintaining a BMI less than 40 as well as smoking cessation as it pertains to possible hip replacement surgery.    From my standpoint lumbar spine surgery would not be helpful at this time.    *This note was dictated using speech recognition software and was not corrected for spelling or grammatical errors*    Medical History[1]    Current  Medications[2]     Social History     Socioeconomic History    Marital status:      Spouse name: Not on file    Number of children: Not on file    Years of education: Not on file    Highest education level: Not on file   Occupational History    Not on file   Tobacco Use    Smoking status: Every Day     Current packs/day: 0.50     Average packs/day: 0.5 packs/day for 39.4 years (19.7 ttl pk-yrs)     Types: Cigarettes     Start date: 1986     Passive exposure: Current    Smokeless tobacco: Never   Vaping Use    Vaping status: Never Used   Substance and Sexual Activity    Alcohol use: Yes     Alcohol/week: 4.0 standard drinks of alcohol     Types: 4 Shots of liquor per week    Drug use: Never    Sexual activity: Yes     Partners: Male   Other Topics Concern    Not on file   Social History Narrative    Not on file     Social Drivers of Health     Financial Resource Strain: Low Risk  (5/20/2024)    Overall Financial Resource Strain (CARDIA)     Difficulty of Paying Living Expenses: Not very hard   Food Insecurity: No Food Insecurity (5/21/2024)    Hunger Vital Sign     Worried About Running Out of Food in the Last Year: Never true     Ran Out of Food in the Last Year: Never true   Transportation Needs: No Transportation Needs (5/20/2024)    PRAPARE - Transportation     Lack of Transportation (Medical): No     Lack of Transportation (Non-Medical): No   Physical Activity: Inactive (5/21/2024)    Exercise Vital Sign     Days of Exercise per Week: 0 days     Minutes of Exercise per Session: 0 min   Stress: No Stress Concern Present (5/21/2024)    [employer] - Occupational Stress Questionnaire     Feeling of Stress : Not at all   Social Connections: Unknown (5/21/2024)    Social Connection and Isolation Panel [NHANES]     Frequency of Communication with Friends and Family: More than three times a week     Frequency of Social Gatherings with Friends and Family: More than three times a week      Attends Muslim Services: Patient declined     Active Member of Clubs or Organizations: Patient declined     Attends Club or Organization Meetings: Patient declined     Marital Status: Patient declined   Intimate Partner Violence: Not At Risk (5/21/2024)    Humiliation, Afraid, Rape, and Kick questionnaire     Fear of Current or Ex-Partner: No     Emotionally Abused: No     Physically Abused: No     Sexually Abused: No   Housing Stability: Low Risk  (5/20/2024)    Housing Stability Vital Sign     Unable to Pay for Housing in the Last Year: No     Number of Places Lived in the Last Year: 1     Unstable Housing in the Last Year: No       Luisito Barber MD  Director, [facility] [place]   Department of Orthopaedic Surgery  [facility]  [address]  [place] [id number]  [phone]       [1]   Past Medical History:  Diagnosis Date    Anxiety     Asthma     Coronary artery disease     Depression     GERD (gastroesophageal reflux disease)     Hyperlipidemia     Hypertension     Myocardial infarction (Multi)     Obesity     Severe obesity (BMI >= 40) (Multi) 09/01/2023    Sleep apnea     Type 2 diabetes mellitus    [2]   Current Outpatient Medications:     albuterol (Proventil HFA) 90 mcg/actuation inhaler, Inhale 2 puffs every 4 hours if needed., Disp: , Rfl:     amitriptyline (Elavil) 25 mg tablet, TAKE TWO TABLETS BY MOUTH once DAILYat bedtime, Disp: 180 tablet, Rfl: 0    Blood glucose monitoring (True Metrix Glucose Meter) meter, daily, Disp: 1 each, Rfl: 0    blood sugar diagnostic (True Metrix Glucose Test Strip), 1 each 2 times a day., Disp: 200 each, Rfl: 2    blood-glucose meter misc, Twice daily, Disp: 1 each, Rfl: 0    blood-glucose sensor (FreeStyle Melissa 3 Plus Sensor) device, 1 each continuously., Disp: 2 each, Rfl: 2    butalbital-acetaminophen-caff -40 mg tablet, TAKE 1 TO 2 TABLET BY MOUTH EVERY 4 HOURS AS NEEDED F,  Disp: , Rfl:     carvedilol (Coreg) 12.5 mg tablet, TAKE ONE TABLET BY MOUTH TWO TIMES A DAY with food, Disp: 180 tablet, Rfl: 0    cyanocobalamin, vitamin B-12, (VITAMIN B-12 ORAL), 1000 MCG  as directed Orally, Disp: , Rfl:     docusate sodium (Colace) 100 mg capsule, Take 1 capsule (100 mg) by mouth 2 times a day as needed for constipation., Disp: 60 capsule, Rfl: 1    dulaglutide (Trulicity) 4.5 mg/0.5 mL pen injector, Inject 4.5 mg under the skin 1 (one) time per week., Disp: 2 mL, Rfl: 5    ergocalciferol (Vitamin D-2) 1.25 MG (68547 UT) capsule, Take 1 capsule (1.25 mg) by mouth 1 (one) time per week. THURSDAY, Disp: , Rfl:     escitalopram (Lexapro) 20 mg tablet, Take 1 tablet (20 mg) by mouth once daily at bedtime., Disp: 90 tablet, Rfl: 1    ezetimibe (Zetia) 10 mg tablet, Take 1 tablet (10 mg) by mouth once daily., Disp: 90 tablet, Rfl: 3    FreeStyle Lancets 28 gauge, USE AS DIRECTED EVERY 12 HOURS, Disp: , Rfl:     FreeStyle Melissa 3 Sensor device, 1 each continuously., Disp: 2 each, Rfl: 5    Gralise 600 mg tablet extended release 24 hr, Take 2 tablets by mouth once daily at bedtime., Disp: , Rfl:     hydroCHLOROthiazide (Microzide) 12.5 mg capsule, TAKE ONE CAPSULE BY MOUTH EVERY MORNING, Disp: 90 capsule, Rfl: 0    ibuprofen 800 mg tablet, Take 1 tablet (800 mg) by mouth every 6 hours if needed for mild pain (1 - 3)., Disp: 30 tablet, Rfl: 1    potassium chloride CR 10 mEq ER tablet, Take 2 tablets (20 mEq) by mouth once daily. Do not crush, chew, or split., Disp: 180 tablet, Rfl: 1    pravastatin (Pravachol) 80 mg tablet, Take 1 tablet (80 mg) by mouth once daily., Disp: 90 tablet, Rfl: 3    topiramate (Topamax) 100 mg tablet, Take 2 tablets (200 mg) by mouth once daily at bedtime., Disp: , Rfl:     traMADoL-acetaminophen (Ultracet) 37.5-325 mg tablet, Take 1 tablet by mouth 4 times a day as needed for severe pain (7 - 10)., Disp: 120 tablet, Rfl: 0    valsartan (Diovan) 40 mg tablet, Take 2 tablets  (80 mg) by mouth once daily., Disp: 180 tablet, Rfl: 1    verapamil SR (Calan-SR) 120 mg ER tablet, Take 1 tablet (120 mg) by mouth once daily at bedtime., Disp: 90 tablet, Rfl: 1     Carac Counseling:  I discussed with the patient the risks of Carac including but not limited to erythema, scaling, itching, weeping, crusting, and pain.

## 2025-06-15 NOTE — PROGRESS NOTES
BELIA Jiang, PA-C  Division of Adult Reconstruction  Phone: [phone]  Fax: [phone]    PRIMARY CARE PHYSICIAN: Mira Ruiz MD  REFERRING PROVIDER: No referring provider defined for this encounter.     HELLEN Romero is a pleasant 54 y.o. year-old female who is seen today for evaluation of left hip pain. The pain has been present for about 5 years. She rates the pain a 8/9 out of 10. They do not associate with a traumatic injury. The patient states that the pain is located in the groin and radiates into the lateral aspect of her hip and posterior left glute. The patient denies any history of inflammatory arthritis. The patient's pain and symptoms are worse with activity. The patient has tried PT, OTC medications, heat, Tramadol, and IA CSI without sufficient relief of symptoms. She states she has had over 6 intraarticular steroid injections with the last one only lasting about 2-3 weeks. She does report instability of the hip. She states she recently fell 2 times over the weekend due to this. She ambulates with a single crutch. The patient reports a significant reduction in quality of life and activities of daily living.     History of Hip Surgery: None  Functional status: occasionally limited  Instability: Yes  Symptoms impacting sleep: Yes  Impacting quality of life: Yes  Pain level: 8/9 out of 10  Back pain: Yes  Radicular symptoms: kwside: bilateral    Pertinent PMHx includes: HTN, CAD s/p CABG in 2013, DM2 last A1C 5.5% on 5/20/25, long term use of Tramadol, tobacco dependence.    Alcohol Use: No  Tobacco Use: Yes; around 12 cigarettes a day    Review of systems  There has been no interval change in this patient's past medical, surgical, medications, allergies, family history or social history since the most recent visit to a provider within our department. Adult patient history sheet was filled out by the patient today in clinic and will be scanned into the EMR. I  personally reviewed this form which will be scanned into the EMR. 14 point review of systems was performed, reviewed, and negative except for pertinent positives documented in the history of present illness.       Review of Systems  Medical History[1]  Problem List[2]  Medication Documentation Review Audit       Reviewed by Sheba Purdy MA (Medical Assistant) on 06/02/25 at 0854      Medication Order Taking? Sig Documenting Provider Last Dose Status   albuterol (Proventil HFA) 90 mcg/actuation inhaler [id number] No Inhale 2 puffs every 4 hours if needed. Jaydon Patricia MD Taking Active   amitriptyline (Elavil) 25 mg tablet [id number]  TAKE TWO TABLETS BY MOUTH once DAILYat bedtime Mira Ruiz MD  Active   Blood glucose monitoring (True Metrix Glucose Meter) meter [id number]  daily Mira Ruiz MD  Active   blood sugar diagnostic (True Metrix Glucose Test Strip) [id number]  1 each 2 times a day. Mira Ruiz MD  Active   blood-glucose meter misc [id number] No Twice daily Mira Ruiz MD Taking Active   blood-glucose sensor (FreeStyle Melissa 3 Plus Sensor) device [id number]  1 each continuously. Mira Ruiz MD  Active   butalbital-acetaminophen-caff -40 mg tablet [id number] No TAKE 1 TO 2 TABLET BY MOUTH EVERY 4 HOURS AS NEEDED F Historical MD Harshad Taking Active   carvedilol (Coreg) 12.5 mg tablet [id number]  TAKE ONE TABLET BY MOUTH TWO TIMES A DAY with food Mira Ruiz MD  Active   cyanocobalamin, vitamin B-12, (VITAMIN B-12 ORAL) [id number] No 1000 MCG  as directed Orally Jaydon Patricia MD Taking Active   docusate sodium (Colace) 100 mg capsule [id number] No Take 1 capsule (100 mg) by mouth 2 times a day as needed for constipation. Jeffery Peters MD Taking Active   dulaglutide (Trulicity) 4.5 mg/0.5 mL pen injector [id number]  Inject 4.5 mg under the skin 1 (one) time per week. Mira Ruiz MD  Active   ergocalciferol (Vitamin D-2) 1.25 MG (40777 UT) capsule [id number] No Take 1 capsule (1.25 mg) by  mouth 1 (one) time per week. THURSDAY Jaydon Patricia MD Taking Active   escitalopram (Lexapro) 20 mg tablet [id number]  Take 1 tablet (20 mg) by mouth once daily at bedtime. Mira Ruiz MD  Active   ezetimibe (Zetia) 10 mg tablet [id number]  Take 1 tablet (10 mg) by mouth once daily. Mira Ruiz MD  Active   FreeStyle Lancets 28 gauge [id number] No USE AS DIRECTED EVERY 12 HOURS Jaydon Patricia MD Taking Active   FreeStyle Melissa 3 Sensor device [id number] No 1 each continuously. Mira Ruiz MD Taking Active   Gralise 600 mg tablet extended release 24 hr [id number] No Take 2 tablets by mouth once daily at bedtime. Jaydon Patricia MD Taking Active   hydroCHLOROthiazide (Microzide) 12.5 mg capsule [id number]  TAKE ONE CAPSULE BY MOUTH EVERY MORNING Mira Ruiz MD  Active   ibuprofen 800 mg tablet [id number] No Take 1 tablet (800 mg) by mouth every 6 hours if needed for mild pain (1 - 3). Jeffery Peters MD Taking Active   potassium chloride CR 10 mEq ER tablet [id number]  Take 2 tablets (20 mEq) by mouth once daily. Do not crush, chew, or split. Mira Ruiz MD  Active   pravastatin (Pravachol) 80 mg tablet [id number]  Take 1 tablet (80 mg) by mouth once daily. Mira Ruiz MD  Active   topiramate (Topamax) 100 mg tablet [id number] No Take 2 tablets (200 mg) by mouth once daily at bedtime. Jaydon Patricia MD Taking Active   traMADoL-acetaminophen (Ultracet) 37.5-325 mg tablet [id number]  Take 1 tablet by mouth 4 times a day as needed for severe pain (7 - 10). Yuki Gallagher MD  Active   valsartan (Diovan) 40 mg tablet [id number]  Take 2 tablets (80 mg) by mouth once daily. Mira Ruiz MD  Active   verapamil SR (Calan-SR) 120 mg ER tablet [id number]  Take 1 tablet (120 mg) by mouth once daily at bedtime. Mira Ruiz MD  Active                  RX Allergies[3]  Social History     Socioeconomic History    Marital status:      Spouse name: Not on file    Number of children: Not on file    Years of  education: Not on file    Highest education level: Not on file   Occupational History    Not on file   Tobacco Use    Smoking status: Every Day     Current packs/day: 0.50     Average packs/day: 0.5 packs/day for 39.5 years (19.7 ttl pk-yrs)     Types: Cigarettes     Start date: 1986     Passive exposure: Current    Smokeless tobacco: Never   Vaping Use    Vaping status: Never Used   Substance and Sexual Activity    Alcohol use: Yes     Alcohol/week: 4.0 standard drinks of alcohol     Types: 4 Shots of liquor per week    Drug use: Never    Sexual activity: Yes     Partners: Male   Other Topics Concern    Not on file   Social History Narrative    Not on file     Social Drivers of Health     Financial Resource Strain: Low Risk  (5/20/2024)    Overall Financial Resource Strain (CARDIA)     Difficulty of Paying Living Expenses: Not very hard   Food Insecurity: No Food Insecurity (5/21/2024)    Hunger Vital Sign     Worried About Running Out of Food in the Last Year: Never true     Ran Out of Food in the Last Year: Never true   Transportation Needs: No Transportation Needs (5/20/2024)    PRAPARE - Transportation     Lack of Transportation (Medical): No     Lack of Transportation (Non-Medical): No   Physical Activity: Inactive (5/21/2024)    Exercise Vital Sign     Days of Exercise per Week: 0 days     Minutes of Exercise per Session: 0 min   Stress: No Stress Concern Present (5/21/2024)    [employer] - Occupational Stress Questionnaire     Feeling of Stress : Not at all   Social Connections: Unknown (5/21/2024)    Social Connection and Isolation Panel [NHANES]     Frequency of Communication with Friends and Family: More than three times a week     Frequency of Social Gatherings with Friends and Family: More than three times a week     Attends Judaism Services: Patient declined     Active Member of Clubs or Organizations: Patient declined     Attends Club or Organization Meetings: Patient  declined     Marital Status: Patient declined   Intimate Partner Violence: Not At Risk (5/21/2024)    Humiliation, Afraid, Rape, and Kick questionnaire     Fear of Current or Ex-Partner: No     Emotionally Abused: No     Physically Abused: No     Sexually Abused: No   Housing Stability: Low Risk  (5/20/2024)    Housing Stability Vital Sign     Unable to Pay for Housing in the Last Year: No     Number of Places Lived in the Last Year: 1     Unstable Housing in the Last Year: No     Surgical History[4]      Physical Exam  side: left Hip:  General: Well-appearing female in no acute distress.  Awake, alert and oriented.  Pleasant and cooperative.  Respiratory: Non-labored breathing  Mood: Euthymic   Gait: Antalgic  Assistive Device: Single crutch  Skin: warm, dry and intact without lesions    Range of Motion affected side: left hip:   Full range of motion could not be assessed due to pain.  Flexion: 90  Extension: Unable to fully extend laying supine due to pain  Internal rotation: 7, pain with FADIR  External rotation: 30, pain with CONCHITA  abduction: 20  Hip Flexor Strength: 3/5, positive Stinchfield  Abductor Strength: 4/5  Adductor Strength: 5/5  Limb Length Discrepancy: Yes  Tenderness: Lateral aspect of the hip and the groin  Sensation: Intact to light touch distally  Motor function: Able to fire TA, EHL, G/S  Pulses: Palpable DP pulse      Imaging:   3 view Hip and Pelvis were independently reviewed and interpreted in clinic today. The findings were reviewed with the patient. There are Advanced degenerative changes of the left hip with associated joint space narrowing, subchondral sclerosis, and osteophyte formation. No evidence of fracture, AVN, dislocation, osteomyelitis.    Impression/Plan:  Camille Romero is a 54 y.o. year-old female with Severe OA of the side: left hip. We reviewed an evidence-based approach to osteoarthritis of the hip.  We discussed in detail a treatment plan that She is comfortable  with. At this time I feel the patient has maximized conservative management with over-the-counter medications, activity modification, intra-articular corticosteroid injections, and opioid medications without sufficient relief.  I advised her not to get any more intra-articular steroid injections as she is not getting sufficient relief from them as well as the risks of AVN or further deterioration of the hip. I briefly covered the risks, benefits and expected recovery after total joint arthroplasty.    I counselled the patient about the risks of tobacco use with respect to total joint arthroplasty. Tobacco use has been shown to increase the risk of prosthetic joint infection, delayed wound healing and venous thromboembolism. Additionally, tobacco use is associated with long term cardiac and pulmonary complications.  Patient needs to be nicotine free for at least 1 month and will undergo nicotine testing prior to surgery. Should the testing be positive, there may be a delay or cancellation of the surgical case. Pt voiced understanding. Total time spent on tobacco cessation: 4 minutes.    Camille Romero may one day benefit from an elective total joint replacement however she is not currently a candidate due to her tobacco dependence. I do not see a clear indication to move forward with arthroplasty. I have recommended the following and the patient is in agreement with the plan.  Continued conservative management with OTC medications, activity modification, and at home exercise program    All of the patient's questions were answered and she was comfortable with this plan of care.  Camille Romero was encouraged to call if any problems, questions or concerns arise.     Follow-up PRN to schedule surgery. Patient must stop smoking for >4 weeks prior to the surgery will get UA to test. Pt will need dental clearance.    robert Baer. [employer].     BELIA Jimenez, PA-C  Orthopaedic Physician  Assistant  Division of Adult Reconstruction  Department of Orthopaedics  [place] [address]  Phone: [phone]  Fax: [phone]       [1]   Past Medical History:  Diagnosis Date    Anxiety     Asthma     Coronary artery disease     Depression     GERD (gastroesophageal reflux disease)     Hyperlipidemia     Hypertension     Myocardial infarction (Multi)     Obesity     Severe obesity (BMI >= 40) (Multi) 09/01/2023    Sleep apnea     Type 2 diabetes mellitus    [2]   Patient Active Problem List  Diagnosis    At risk for bleeding    Anxiety    Benign essential hypertension    Blood glucose abnormal    Breakthrough bleeding    Cellulitis    Coronary artery disease    Degeneration of lumbar intervertebral disc    Depression    Lightheadedness    Dyslipidemia    Epigastric pain    GERD (gastroesophageal reflux disease)    Hyperlipidemia    Hypertension    Irregular menses    Leukocytosis    Migraine    Neuropathy    Non-cardiac chest pain    Noncompliance with medication regimen    Obesity with body mass index 30 or greater    Sleep apnea    Tobacco dependence    Type 2 diabetes mellitus    Vaginal dryness, menopausal    Vitamin D deficiency    PMB (postmenopausal bleeding)    Fibroid    Right shoulder pain    Other sprain of right shoulder joint, initial encounter    Sprain of shoulder, left    Rotator cuff impingement syndrome of left shoulder    Acute bursitis of right shoulder    Acute bursitis of left shoulder    Localized osteoarthritis of right shoulder    Left shoulder pain    Long term (current) use of opiate analgesic    Lumbar radiculopathy   [3]   Allergies  Allergen Reactions    Latex Anaphylaxis, Shortness of breath and Hives    Aspirin Hives    Bupropion Hcl Other and Nausea Only    Pertussis Vaccines Hives    Diphtheria, Pertussis, Tetanus Vaccine Hives    Nickel Rash, Swelling and Hives    Penicillins Hives and Swelling   [4]    Past Surgical History:  Procedure Laterality Date    ADENOIDECTOMY      COLONOSCOPY      CORONARY ARTERY BYPASS GRAFT  2009    X 1 graft    TONSILLECTOMY

## 2025-06-16 ENCOUNTER — OFFICE VISIT (OUTPATIENT)
Dept: PAIN MEDICINE | Facility: CLINIC | Age: 55
End: 2025-06-16
Payer: COMMERCIAL

## 2025-06-16 ENCOUNTER — HOSPITAL ENCOUNTER (OUTPATIENT)
Dept: RADIOLOGY | Facility: CLINIC | Age: 55
Discharge: HOME | End: 2025-06-16
Payer: COMMERCIAL

## 2025-06-16 VITALS — HEART RATE: 72 BPM | DIASTOLIC BLOOD PRESSURE: 74 MMHG | OXYGEN SATURATION: 99 % | SYSTOLIC BLOOD PRESSURE: 118 MMHG

## 2025-06-16 DIAGNOSIS — M54.16 LUMBAR RADICULOPATHY: ICD-10-CM

## 2025-06-16 DIAGNOSIS — M16.12 PRIMARY OSTEOARTHRITIS OF LEFT HIP: ICD-10-CM

## 2025-06-16 DIAGNOSIS — M25.552 LEFT HIP PAIN: Primary | ICD-10-CM

## 2025-06-16 DIAGNOSIS — Z79.891 LONG TERM (CURRENT) USE OF OPIATE ANALGESIC: ICD-10-CM

## 2025-06-16 DIAGNOSIS — M16.12 PRIMARY LOCALIZED OSTEOARTHRITIS OF LEFT HIP: ICD-10-CM

## 2025-06-16 PROCEDURE — 3078F DIAST BP <80 MM HG: CPT | Performed by: ANESTHESIOLOGY

## 2025-06-16 PROCEDURE — 4010F ACE/ARB THERAPY RXD/TAKEN: CPT | Performed by: ANESTHESIOLOGY

## 2025-06-16 PROCEDURE — 3044F HG A1C LEVEL LT 7.0%: CPT | Performed by: ANESTHESIOLOGY

## 2025-06-16 PROCEDURE — 99214 OFFICE O/P EST MOD 30 MIN: CPT | Performed by: ANESTHESIOLOGY

## 2025-06-16 PROCEDURE — 73502 X-RAY EXAM HIP UNI 2-3 VIEWS: CPT | Mod: LT

## 2025-06-16 PROCEDURE — 3074F SYST BP LT 130 MM HG: CPT | Performed by: ANESTHESIOLOGY

## 2025-06-16 RX ORDER — TRAMADOL HYDROCHLORIDE AND ACETAMINOPHEN 37.5; 325 MG/1; MG/1
1 TABLET ORAL 4 TIMES DAILY PRN
Qty: 120 TABLET | Refills: 0 | Status: SHIPPED | OUTPATIENT
Start: 2025-06-27 | End: 2025-07-27

## 2025-06-16 ASSESSMENT — PAIN SCALES - GENERAL
PAINLEVEL_OUTOF10: 6
PAINLEVEL_OUTOF10: 6

## 2025-06-16 ASSESSMENT — ENCOUNTER SYMPTOMS
BACK PAIN: 1
DEPRESSION: 0
RESPIRATORY NEGATIVE: 1
LOSS OF SENSATION IN FEET: 0
WEAKNESS: 1
EYES NEGATIVE: 1
CONSTITUTIONAL NEGATIVE: 1
PSYCHIATRIC NEGATIVE: 1
GASTROINTESTINAL NEGATIVE: 1
HEMATOLOGIC/LYMPHATIC NEGATIVE: 1
CARDIOVASCULAR NEGATIVE: 1
OCCASIONAL FEELINGS OF UNSTEADINESS: 0
ENDOCRINE NEGATIVE: 1
NUMBNESS: 1

## 2025-06-16 ASSESSMENT — PAIN - FUNCTIONAL ASSESSMENT: PAIN_FUNCTIONAL_ASSESSMENT: 0-10

## 2025-06-16 NOTE — PROGRESS NOTES
PAIN MANAGEMENT FOLLOW-UP OFFICE NOTE    Date of Service: 6/16/2025    SUBJECTIVE    CHIEF COMPLAINT: LBP    HISTORY OF PRESENT ILLNESS    Camille Romero is a 54 y.o. 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 Saw Dr Barber 6/2 who deemed pt not surgical candidate and suspects L hip rather than spine. Referred to ortho Dr Duff who she is seeing 6/18 and referred back here for hip CSI. New XR ordered not complete. Maintains numbness and weakness in L>RLE and has EMG scheduled for Thursday. Claims she got 7 d of 90% relief after L hip CSI with Dr Mckay in January.     Pt denies new-onset numbness, weakness, bowel/bladder incontinence.  Pt denies recent infection, allergy to iodine/contrast. Patient is currently taking the following blood thinner(s): N/A    REVIEW OF SYSTEMS  Review of Systems   Constitutional: Negative.    HENT: Negative.     Eyes: Negative.    Respiratory: Negative.     Cardiovascular: Negative.    Gastrointestinal: Negative.    Endocrine: Negative.    Musculoskeletal:  Positive for back pain.   Skin: Negative.    Neurological:  Positive for weakness and numbness.   Hematological: Negative.    Psychiatric/Behavioral: Negative.         PAST MEDICAL HISTORY  Medical History[1]  Surgical History[2]  Family History[3]    CURRENT MEDICATIONS  Current Medications[4]    ALLERGIES AND DRUG REACTIONS  Allergies[5]       OBJECTIVE  Visit Vitals  /74   Pulse 72   SpO2 99%   OB Status Hysterectomy   Smoking Status Every Day       Last Recorded Pain Score (if available):           Pain Score:   6     Physical Exam  Vitals and nursing note reviewed.     General: Sitting in chair, NAD  Head: NCAT  Eyes: Sclera/conjunctiva clear, EOMI, PERRL  Nose/mouth: MMM  CV: Good distal pulses  Lungs: Good/equal chest  excursion  Abdomen: Soft, ND  Ext: No cyanosis/edema  MSK: L-spine alignment: unremarkable, BL paraspinal m TTP, L-spine ROM: extension lmtd by pain with +facet-loading, pain on flexion  L hip: no laxity, pain on internal/external rotation, +Otf's    Neuro: AAOx3, CN grossly nl  Dermatome sensation to light touch  LEFT L1 (lower pelvis/upper thigh): WNL    RIGHT L1: WNL      LEFT L2 (upper thigh): improved       RIGHT: L2:improved         LEFT L3 (medial knee): decreased       RIGHT L3: decreased         LEFT L4 (superior medial malleolus): decreased       RIGHT L4: decreased         LEFT L5 (dorsal foot): decreased       RIGHT L5: decreased         LEFT S1 (lateral foot): decreased       RIGHT S1: decreased         LEFT S2 (popliteal fossa): decreased       RIGHT S2: decreased           Motor strength (effort unclear, but reportedly lmtd by back pain)  LEFT L2 (hip flexion): 4/5   RIGHT L2: improved 5/5  LEFT L3 (knee extension): improved 4/5     RIGHT L3: improved 5/5  LEFT L4 (dorsiflexion): 4/5     RIGHT L4: improved 5/5  LEFT L5 (EHL extension): 4/5     RIGHT L5: improved 5/5  LEFT S1 (plantarflexion): 4/5     RIGHT S1: 5/5  LEFT S2 (knee flexion): improved 4/5      RIGHT S2: improved 5/5    Special testing  DTR unremarkable  Seated slump test +BLE      Psych: affect nl  Skin: no rash/lesions      REVIEW OF LABORATORY DATA  I have reviewed the following lab results:  WBC   Date Value Ref Range Status   07/09/2024 9.1 4.4 - 11.3 x10*3/uL Final     RBC   Date Value Ref Range Status   07/09/2024 5.10 4.00 - 5.20 x10*6/uL Final     Hemoglobin   Date Value Ref Range Status   07/09/2024 15.7 12.0 - 16.0 g/dL Final     Hematocrit   Date Value Ref Range Status   07/09/2024 47.6 (H) 36.0 - 46.0 % Final     MCV   Date Value Ref Range Status   07/09/2024 93 80 - 100 fL Final     MCH   Date Value Ref Range Status   07/09/2024 30.8 26.0 - 34.0 pg Final     MCHC   Date Value Ref Range Status   07/09/2024 33.0 32.0 - 36.0  "g/dL Final     RDW   Date Value Ref Range Status   07/09/2024 12.7 11.5 - 14.5 % Final     Platelets   Date Value Ref Range Status   07/09/2024 206 150 - 450 x10*3/uL Final     MPV   Date Value Ref Range Status   06/21/2023 10.8 7.0 - 12.6 CU Final     Sodium   Date Value Ref Range Status   07/09/2024 142 133 - 145 mmol/L Final     Potassium   Date Value Ref Range Status   07/09/2024 4.3 3.4 - 5.1 mmol/L Final     Bicarbonate   Date Value Ref Range Status   07/09/2024 23 (L) 24 - 31 mmol/L Final     Urea Nitrogen   Date Value Ref Range Status   07/09/2024 15 8 - 25 mg/dL Final     Calcium   Date Value Ref Range Status   07/09/2024 9.1 8.5 - 10.4 mg/dL Final     No results found for: \"PROTIME\", \"PTT\", \"INR\", \"FIBRINOGEN\"      REVIEW OF RADIOLOGY   I have reviewed the following:  Radiology Studies     XR BL hip 12/19/23:  RESULT:      Right hip: There is no evidence for fracture or dislocation. There is  joint space narrowing with osteophyte formation of the acetabular rim  and femoral head.      Left hip: There is no evidence for fracture or dislocation. There is  joint space narrowing with osteophyte formation of the acetabular rim  and femoral head.      IMPRESSION:  Osteoarthritis of the bilateral hip               MRI L-spine 5/13/25     There is a transitional morphology. For the purpose of this  examination the last well-formed disc space is labeled as L5/S1 with  presumed  sacralized L5 vertebral body. Confirmation of lumbar  numbers before surgical intervention is recommended.      Alignment: The vertebral alignment is maintained.      Vertebrae/Intervertebral Discs: The vertebral bodies demonstrate  expected height. The marrow signal is within normal limits.      Conus: The lower thoracic cord appears unremarkable. The conus  terminates at L1.      T12-L1: Minimal disc space narrowing and endplate spondylosis. Mild  disc bulge and facet hypertrophy without significant central canal or  foraminal stenosis.    "   L1-2: Minimal disc space narrowing and endplate spondylosis. Mild  disc bulge and facet hypertrophy without significant central canal or  foraminal stenosis.      L2-3: Mild disc space narrowing and endplate spondylosis. Mild disc  bulge and facet hypertrophy without significant central canal  stenosis. Mild right and mild left foraminal stenosis.      L3-4: Mild disc space narrowing and endplate spondylosis. Mild  hypertrophic facet arthropathy with 2 mm grade 1 anterolisthesis and  disc bulge without significant central canal stenosis. Mild right and  mild left foraminal stenosis.      L4-5: Minimal disc space narrowing and endplate spondylosis. Moderate  hypertrophic facet arthropathy with 3 mm grade 1 anterolisthesis and  disc uncovering without significant central canal stenosis. Moderate  right and mild left foraminal stenosis. Pedicles edema greater on the  left, consistent with stress reaction.      L5-S1: There is no significant central canal or neural foraminal  stenosis.      IMPRESSION:  Transitional morphology.      L4/L5: Facet arthropathy with grade 1 anterolisthesis. Moderate right  foraminal stenosis. Pedicle stress reaction.        ASSESSMENT & PLAN  Camille Romero is a 54 y.o. 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 BLE numbness worst in feet likely 2/2 superimposed DPN  -Refractive to >3 y conservative tx including Tylenol, ibuprofen, >6 w PT (last done 2024- just finished PT for R shoulder), Gralise, TCA, tramadol, NEYMAR  -MRI L-spine 5/13/25: multilevel spondylosis featuring sacralized L5, G1 L4-5 listhesis with mod R NFS/pedicle stress rxn  -Worsening exam out of proportion with MRI; s/f EMG BLE 6/19  -Saw Dr Barber 6/2/25: suspects spine not pain generator, more likely L hip. XR  ordered, referred to Dr Duff. Rec hip CSI (see hip mgmt below)  -See opioid mgmt below    2) L hip pain  -LBP radiating to L anterior thigh  -Refractive to yrs of conservative tx including Tylenol, ibuprofen, >6 w PT (last done 2024- just finished PT for R shoulder), Gralise, TCA, tramadol, NEYMAR  -Saw ortho spine Dr Barber 6/2 who rec'd no spine surgery, referred to ortho Dr Duff s/f eval 6/18  -Reviewed/discussed XR BL hips 6/16/25: mod-severe L vs mod R hip OA  -claims L hip CSI with Dr Josue 1/2025: 90% relief 7 d  -Schedule diagnostic/therapeutic L hip CSI w/ fluoro to target pain generator as seen on imaging and minimize risk/likelihood of chronic opioid use and/or surgery    3) Opioid mgmt  -2/2 chronic LBP refractive to conservative, interventional tx  -On Ultracet 37.5 QID PRN w moderate relief. Denies SE. Requests RF  - reviewed/appropriate. Reviewed UDS 4/28: OK. No sign of aberrant behavior  -RF Ultracet x30 d  -Consider Belbuca    4) Smoking  Patient smokes 1/4 ppd. Spent 3 min encouraging/counseling on smoking cessation 5/19 as this may increase systemic inflammatory factors which may contribute to patient's chronic pain in addition to smoking as generalized health detriments.                  Discussed procedure risks/benefits in detail with patient. Pt meets medical necessity for procedure due to failure of conservative measures. Reviewed procedural risks including bleeding, infection, nerve damage, paralysis. Also reviewed mitigating factors such as screening for infection/blood thinner use, sterile precautions, and image-guidance when applicable. All questions answered. Pt/guardian expressed understanding and choose to proceed      Today's visit involved continuation of chronic pain care. In the context of the complexity of this patient's chronic pain diagnosis, long-term expectations and care planning discussed. Adequate time taken to ensure patient understanding and answer questions. Imaging  studies ordered are placed do elucidate the patient's diagnosis, but also to evaluate the patient's candidacy for procedural and surgical interventions. The risks and benefits of these potential interventions are detailed as above.           Yuki Gallagher MD  Anesthesiologist & Interventional Pain Physician   [facility] [place]  O: [phone]  F: [phone]  9:10 AM  06/16/25         [1]   Past Medical History:  Diagnosis Date    Anxiety     Asthma     Coronary artery disease     Depression     GERD (gastroesophageal reflux disease)     Hyperlipidemia     Hypertension     Myocardial infarction (Multi)     Obesity     Severe obesity (BMI >= 40) (Multi) 09/01/2023    Sleep apnea     Type 2 diabetes mellitus    [2]   Past Surgical History:  Procedure Laterality Date    ADENOIDECTOMY      COLONOSCOPY      CORONARY ARTERY BYPASS GRAFT  2009    X 1 graft    TONSILLECTOMY     [3]   Family History  Problem Relation Name Age of Onset    Hypertension Mother      Stroke Mother          CVA    Diabetes Father      Hypertension Father      Heart disease Father      Cancer Father      Heart disease Father's Brother      Other (stents) Father's Brother      Other (defribrillator) Father's Brother     [4]   Current Outpatient Medications   Medication Sig Dispense Refill    albuterol (Proventil HFA) 90 mcg/actuation inhaler Inhale 2 puffs every 4 hours if needed.      amitriptyline (Elavil) 25 mg tablet TAKE TWO TABLETS BY MOUTH once DAILYat bedtime 180 tablet 0    Blood glucose monitoring (True Metrix Glucose Meter) meter daily 1 each 0    blood sugar diagnostic (True Metrix Glucose Test Strip) 1 each 2 times a day. 200 each 2    blood-glucose meter misc Twice daily 1 each 0    blood-glucose sensor (FreeStyle Melissa 3 Plus Sensor) device 1 each continuously. 2 each 2    butalbital-acetaminophen-caff -40 mg tablet TAKE 1 TO 2 TABLET BY MOUTH EVERY 4 HOURS AS NEEDED F      carvedilol (Coreg) 12.5 mg tablet TAKE ONE  TABLET BY MOUTH TWO TIMES A DAY with food 180 tablet 0    cyanocobalamin, vitamin B-12, (VITAMIN B-12 ORAL) 1000 MCG  as directed Orally      docusate sodium (Colace) 100 mg capsule Take 1 capsule (100 mg) by mouth 2 times a day as needed for constipation. 60 capsule 1    dulaglutide (Trulicity) 4.5 mg/0.5 mL pen injector Inject 4.5 mg under the skin 1 (one) time per week. 2 mL 5    ergocalciferol (Vitamin D-2) 1.25 MG (23274 UT) capsule Take 1 capsule (1.25 mg) by mouth 1 (one) time per week. THURSDAY      escitalopram (Lexapro) 20 mg tablet Take 1 tablet (20 mg) by mouth once daily at bedtime. 90 tablet 1    ezetimibe (Zetia) 10 mg tablet Take 1 tablet (10 mg) by mouth once daily. 90 tablet 3    FreeStyle Lancets 28 gauge USE AS DIRECTED EVERY 12 HOURS      FreeStyle Melissa 3 Sensor device 1 each continuously. 2 each 5    Gralise 600 mg tablet extended release 24 hr Take 2 tablets by mouth once daily at bedtime.      hydroCHLOROthiazide (Microzide) 12.5 mg capsule TAKE ONE CAPSULE BY MOUTH EVERY MORNING 90 capsule 0    ibuprofen 800 mg tablet Take 1 tablet (800 mg) by mouth every 6 hours if needed for mild pain (1 - 3). 30 tablet 1    potassium chloride CR 10 mEq ER tablet Take 2 tablets (20 mEq) by mouth once daily. Do not crush, chew, or split. 180 tablet 1    pravastatin (Pravachol) 80 mg tablet Take 1 tablet (80 mg) by mouth once daily. 90 tablet 3    topiramate (Topamax) 100 mg tablet Take 2 tablets (200 mg) by mouth once daily at bedtime.      traMADoL-acetaminophen (Ultracet) 37.5-325 mg tablet Take 1 tablet by mouth 4 times a day as needed for severe pain (7 - 10). 120 tablet 0    valsartan (Diovan) 40 mg tablet Take 2 tablets (80 mg) by mouth once daily. 180 tablet 1    verapamil SR (Calan-SR) 120 mg ER tablet Take 1 tablet (120 mg) by mouth once daily at bedtime. 90 tablet 1     No current facility-administered medications for this visit.   [5]   Allergies  Allergen Reactions    Latex Anaphylaxis,  Shortness of breath and Hives    Aspirin Hives    Bupropion Hcl Other and Nausea Only    Pertussis Vaccines Hives    Diphtheria, Pertussis, Tetanus Vaccine Hives    Nickel Rash, Swelling and Hives    Penicillins Hives and Swelling

## 2025-06-16 NOTE — PROGRESS NOTES
MEDICATION NAME: Ultracet  STRENGTH: 37.5-325 mg  LAST FILL DATE: 2025  DATE LAST TAKEN: 2025  QUANTITY FILLED: 120  QUANTITY REMAININ  COUNT COMPLETED BY: JAMES KAUR RN and MIKEY MORALES LPN

## 2025-06-18 ENCOUNTER — OFFICE VISIT (OUTPATIENT)
Dept: ORTHOPEDIC SURGERY | Facility: CLINIC | Age: 55
End: 2025-06-18
Payer: COMMERCIAL

## 2025-06-18 DIAGNOSIS — M16.12 PRIMARY LOCALIZED OSTEOARTHRITIS OF LEFT HIP: Primary | ICD-10-CM

## 2025-06-18 PROCEDURE — 99406 BEHAV CHNG SMOKING 3-10 MIN: CPT

## 2025-06-18 PROCEDURE — 99212 OFFICE O/P EST SF 10 MIN: CPT | Mod: 25

## 2025-06-18 PROCEDURE — 4004F PT TOBACCO SCREEN RCVD TLK: CPT

## 2025-06-18 PROCEDURE — 4010F ACE/ARB THERAPY RXD/TAKEN: CPT

## 2025-06-18 PROCEDURE — 99213 OFFICE O/P EST LOW 20 MIN: CPT

## 2025-06-18 PROCEDURE — 3044F HG A1C LEVEL LT 7.0%: CPT

## 2025-06-19 ENCOUNTER — HOSPITAL ENCOUNTER (OUTPATIENT)
Dept: NEUROLOGY | Facility: CLINIC | Age: 55
Discharge: HOME | End: 2025-06-19
Payer: COMMERCIAL

## 2025-06-19 DIAGNOSIS — M54.16 LUMBAR RADICULOPATHY: ICD-10-CM

## 2025-06-23 ENCOUNTER — LAB (OUTPATIENT)
Dept: LAB | Facility: CLINIC | Age: 55
End: 2025-06-23
Payer: COMMERCIAL

## 2025-06-23 DIAGNOSIS — D72.828 OTHER ELEVATED WHITE BLOOD CELL (WBC) COUNT: ICD-10-CM

## 2025-06-23 LAB
ALBUMIN SERPL BCP-MCNC: 4.3 G/DL (ref 3.4–5)
ALP SERPL-CCNC: 70 U/L (ref 33–110)
ALT SERPL W P-5'-P-CCNC: 17 U/L (ref 7–45)
ANION GAP SERPL CALC-SCNC: 13 MMOL/L (ref 10–20)
AST SERPL W P-5'-P-CCNC: 20 U/L (ref 9–39)
BASOPHILS # BLD AUTO: 0.07 X10*3/UL (ref 0–0.1)
BASOPHILS NFR BLD AUTO: 0.7 %
BILIRUB SERPL-MCNC: 0.7 MG/DL (ref 0–1.2)
BUN SERPL-MCNC: 11 MG/DL (ref 6–23)
CALCIUM SERPL-MCNC: 9.4 MG/DL (ref 8.6–10.3)
CHLORIDE SERPL-SCNC: 103 MMOL/L (ref 98–107)
CO2 SERPL-SCNC: 27 MMOL/L (ref 21–32)
CREAT SERPL-MCNC: 0.71 MG/DL (ref 0.5–1.05)
EGFRCR SERPLBLD CKD-EPI 2021: >90 ML/MIN/1.73M*2
EOSINOPHIL # BLD AUTO: 0.48 X10*3/UL (ref 0–0.7)
EOSINOPHIL NFR BLD AUTO: 5.1 %
ERYTHROCYTE [DISTWIDTH] IN BLOOD BY AUTOMATED COUNT: 13.2 % (ref 11.5–14.5)
GLUCOSE SERPL-MCNC: 128 MG/DL (ref 74–99)
HCT VFR BLD AUTO: 45.7 % (ref 36–46)
HGB BLD-MCNC: 15.3 G/DL (ref 12–16)
IMM GRANULOCYTES # BLD AUTO: 0.02 X10*3/UL (ref 0–0.7)
IMM GRANULOCYTES NFR BLD AUTO: 0.2 % (ref 0–0.9)
LYMPHOCYTES # BLD AUTO: 3.98 X10*3/UL (ref 1.2–4.8)
LYMPHOCYTES NFR BLD AUTO: 42.5 %
MCH RBC QN AUTO: 30.8 PG (ref 26–34)
MCHC RBC AUTO-ENTMCNC: 33.5 G/DL (ref 32–36)
MCV RBC AUTO: 92 FL (ref 80–100)
MONOCYTES # BLD AUTO: 0.4 X10*3/UL (ref 0.1–1)
MONOCYTES NFR BLD AUTO: 4.3 %
NEUTROPHILS # BLD AUTO: 4.41 X10*3/UL (ref 1.2–7.7)
NEUTROPHILS NFR BLD AUTO: 47.2 %
NRBC BLD-RTO: 0 /100 WBCS (ref 0–0)
PLATELET # BLD AUTO: 220 X10*3/UL (ref 150–450)
POTASSIUM SERPL-SCNC: 3.9 MMOL/L (ref 3.5–5.3)
PROT SERPL-MCNC: 7 G/DL (ref 6.4–8.2)
RBC # BLD AUTO: 4.97 X10*6/UL (ref 4–5.2)
SODIUM SERPL-SCNC: 139 MMOL/L (ref 136–145)
WBC # BLD AUTO: 9.4 X10*3/UL (ref 4.4–11.3)

## 2025-06-23 PROCEDURE — 80053 COMPREHEN METABOLIC PANEL: CPT

## 2025-06-23 PROCEDURE — 36415 COLL VENOUS BLD VENIPUNCTURE: CPT

## 2025-06-23 PROCEDURE — 85025 COMPLETE CBC W/AUTO DIFF WBC: CPT

## 2025-06-24 ENCOUNTER — APPOINTMENT (OUTPATIENT)
Dept: HEMATOLOGY/ONCOLOGY | Facility: CLINIC | Age: 55
End: 2025-06-24
Payer: COMMERCIAL

## 2025-06-24 ENCOUNTER — OFFICE VISIT (OUTPATIENT)
Dept: HEMATOLOGY/ONCOLOGY | Facility: CLINIC | Age: 55
End: 2025-06-24
Payer: COMMERCIAL

## 2025-06-24 VITALS
DIASTOLIC BLOOD PRESSURE: 87 MMHG | TEMPERATURE: 96.8 F | WEIGHT: 248.02 LBS | RESPIRATION RATE: 16 BRPM | BODY MASS INDEX: 37.71 KG/M2 | SYSTOLIC BLOOD PRESSURE: 148 MMHG | HEART RATE: 74 BPM | OXYGEN SATURATION: 97 %

## 2025-06-24 DIAGNOSIS — D72.828 OTHER ELEVATED WHITE BLOOD CELL (WBC) COUNT: Primary | ICD-10-CM

## 2025-06-24 DIAGNOSIS — D75.1 ERYTHROCYTOSIS: ICD-10-CM

## 2025-06-24 PROCEDURE — 3044F HG A1C LEVEL LT 7.0%: CPT | Performed by: NURSE PRACTITIONER

## 2025-06-24 PROCEDURE — 3077F SYST BP >= 140 MM HG: CPT | Performed by: NURSE PRACTITIONER

## 2025-06-24 PROCEDURE — 99215 OFFICE O/P EST HI 40 MIN: CPT | Performed by: NURSE PRACTITIONER

## 2025-06-24 PROCEDURE — 3079F DIAST BP 80-89 MM HG: CPT | Performed by: NURSE PRACTITIONER

## 2025-06-24 PROCEDURE — 4010F ACE/ARB THERAPY RXD/TAKEN: CPT | Performed by: NURSE PRACTITIONER

## 2025-06-24 ASSESSMENT — ENCOUNTER SYMPTOMS
ABDOMINAL PAIN: 1
ADENOPATHY: 0
NAUSEA: 0
FATIGUE: 1
BLOOD IN STOOL: 0
FEVER: 0
HEMOPTYSIS: 0
DIARRHEA: 0
CHEST TIGHTNESS: 0
LIGHT-HEADEDNESS: 0
ARTHRALGIAS: 1
ABDOMINAL DISTENTION: 0
MYALGIAS: 1
DIAPHORESIS: 0
SORE THROAT: 0
UNEXPECTED WEIGHT CHANGE: 0
BACK PAIN: 1
BRUISES/BLEEDS EASILY: 0
EYES NEGATIVE: 1
TROUBLE SWALLOWING: 0
EXTREMITY WEAKNESS: 1
HEMATOLOGIC/LYMPHATIC NEGATIVE: 1
CONSTIPATION: 0
DIZZINESS: 0
HEADACHES: 0
CARDIOVASCULAR NEGATIVE: 1
SHORTNESS OF BREATH: 0
COUGH: 0
NECK STIFFNESS: 0
FLANK PAIN: 1

## 2025-06-24 ASSESSMENT — PAIN SCALES - GENERAL: PAINLEVEL_OUTOF10: 10-WORST PAIN EVER

## 2025-06-24 NOTE — PROGRESS NOTES
Patient ID: Camille Romero is a 54 y.o. female.  Referring Physician: No referring provider defined for this encounter.  Primary Care Provider: Mira Ruiz MD  Visit Type: Follow Up      Subjective    HPI  Patient initially referred for leukocytosis, which has now resolved. Worked up by Dr. Hoover in 2015. She was subsequently worked up for erythrocytosis in 2017, including sleep study. Positive sleep apnea, not on CPAP.   CPAP was not comfortable and did not fit.   She continues to smoke. Erythrocytosis has been attributed to smoking and sleep apnea.  On presentation today she denies any fevers, chills or night sweats.  No cough, chest pain or shortness of breath.  No nausea or vomiting.  No constipation or diarrhea.  She has medically had a tough year.  She had multiple dental extraction due to infectious issues of teeth and gum in April and is recovering from this.  She also had hysterectomy after being found to have empyema      Since our last visit patient underwent laparoscopic hysterectomy and bilateral salpingostomatomy, on May 20, 2024 for postmenopausal bleeding. Pathology negative.  She has undergone CABG.  She has chronic back pain and weakness due to herniated disc and is disabled.  She has chronic hip pain.       Review of Systems   Constitutional:  Positive for fatigue. Negative for diaphoresis, fever and unexpected weight change.   HENT:   Negative for hearing loss, lump/mass, mouth sores, nosebleeds, sore throat, tinnitus and trouble swallowing.    Eyes: Negative.    Respiratory:  Negative for chest tightness, cough, hemoptysis and shortness of breath.         Multiple issues with CPAP machine, hesitant to contact sleep medicine for further evaluation and care   Cardiovascular: Negative.    Gastrointestinal:  Positive for abdominal pain. Negative for abdominal distention, blood in stool, constipation, diarrhea and nausea.   Musculoskeletal:  Positive for arthralgias, back pain, flank  pain and myalgias. Negative for neck stiffness.   Skin: Negative.    Neurological:  Positive for extremity weakness. Negative for dizziness, headaches and light-headedness.   Hematological: Negative.  Negative for adenopathy. Does not bruise/bleed easily.      Objective   BSA: 2.33 meters squared  /87 (BP Location: Right arm, Patient Position: Sitting, BP Cuff Size: Adult long)   Pulse 74   Temp 36 °C (96.8 °F) (Temporal)   Resp 16   Wt 113 kg (248 lb 0.3 oz)   SpO2 97%   BMI 37.71 kg/m²      has a past medical history of Anxiety, Asthma, Coronary artery disease, Depression, GERD (gastroesophageal reflux disease), Hyperlipidemia, Hypertension, Myocardial infarction (Multi), Obesity, Severe obesity (BMI >= 40) (Multi) (09/01/2023), Sleep apnea, and Type 2 diabetes mellitus.   has a past surgical history that includes Coronary artery bypass graft (2009); Adenoidectomy; Tonsillectomy; and Colonoscopy.  Family History[1]      Camille Romero  reports that she has been smoking cigarettes. She started smoking about 39 years ago. She has a 19.7 pack-year smoking history. She has been exposed to tobacco smoke. She has never used smokeless tobacco.  She  reports current alcohol use of about 4.0 standard drinks of alcohol per week.  She  reports no history of drug use.    Physical Exam  Constitutional:       Appearance: She is obese.   Eyes:      Conjunctiva/sclera: Conjunctivae normal.   Cardiovascular:      Rate and Rhythm: Normal rate and regular rhythm.      Pulses: Normal pulses.      Heart sounds: Normal heart sounds. No murmur heard.  Pulmonary:      Effort: Pulmonary effort is normal. No respiratory distress.      Breath sounds: Normal breath sounds. No stridor. No wheezing, rhonchi or rales.   Abdominal:      General: There is no distension.      Palpations: There is no mass.      Tenderness: There is no abdominal tenderness.   Musculoskeletal:         General: No swelling, tenderness, deformity or  signs of injury. Normal range of motion.   Lymphadenopathy:      Cervical: No cervical adenopathy.   Skin:     Coloration: Skin is not jaundiced or pale.      Findings: No bruising or erythema.   Neurological:      Motor: Weakness present.      Gait: Gait abnormal.     WBC   Date/Time Value Ref Range Status   06/23/2025 11:06 AM 9.4 4.4 - 11.3 x10*3/uL Final   07/09/2024 09:48 AM 9.1 4.4 - 11.3 x10*3/uL Final   06/25/2024 09:38 AM 9.2 4.4 - 11.3 x10*3/uL Final     nRBC   Date Value Ref Range Status   06/23/2025 0.0 0.0 - 0.0 /100 WBCs Final   07/09/2024 0.0 0.0 - 0.0 /100 WBCs Final   06/25/2024 0.0 0.0 - 0.0 /100 WBCs Final     RBC   Date Value Ref Range Status   06/23/2025 4.97 4.00 - 5.20 x10*6/uL Final   07/09/2024 5.10 4.00 - 5.20 x10*6/uL Final   06/25/2024 5.35 (H) 4.00 - 5.20 x10*6/uL Final     Hemoglobin   Date Value Ref Range Status   06/23/2025 15.3 12.0 - 16.0 g/dL Final   07/09/2024 15.7 12.0 - 16.0 g/dL Final   06/25/2024 16.1 (H) 12.0 - 16.0 g/dL Final     Hematocrit   Date Value Ref Range Status   06/23/2025 45.7 36.0 - 46.0 % Final   07/09/2024 47.6 (H) 36.0 - 46.0 % Final   06/25/2024 49.3 (H) 36.0 - 46.0 % Final     MCV   Date/Time Value Ref Range Status   06/23/2025 11:06 AM 92 80 - 100 fL Final   07/09/2024 09:48 AM 93 80 - 100 fL Final   06/25/2024 09:38 AM 92 80 - 100 fL Final     MCH   Date/Time Value Ref Range Status   06/23/2025 11:06 AM 30.8 26.0 - 34.0 pg Final   07/09/2024 09:48 AM 30.8 26.0 - 34.0 pg Final   06/25/2024 09:38 AM 30.1 26.0 - 34.0 pg Final     MCHC   Date/Time Value Ref Range Status   06/23/2025 11:06 AM 33.5 32.0 - 36.0 g/dL Final   07/09/2024 09:48 AM 33.0 32.0 - 36.0 g/dL Final   06/25/2024 09:38 AM 32.7 32.0 - 36.0 g/dL Final     RDW   Date/Time Value Ref Range Status   06/23/2025 11:06 AM 13.2 11.5 - 14.5 % Final   07/09/2024 09:48 AM 12.7 11.5 - 14.5 % Final   06/25/2024 09:38 AM 13.1 11.5 - 14.5 % Final     Platelets   Date/Time Value Ref Range Status   06/23/2025  11:06  150 - 450 x10*3/uL Final   07/09/2024 09:48  150 - 450 x10*3/uL Final   06/25/2024 09:38  150 - 450 x10*3/uL Final     MPV   Date/Time Value Ref Range Status   06/21/2023 02:58 PM 10.8 7.0 - 12.6 CU Final   02/18/2023 10:05 AM 10.5 7.0 - 12.6 CU Final   02/01/2022 01:09 PM 11.7 7.0 - 12.6 CU Final     Neutrophils %   Date/Time Value Ref Range Status   06/23/2025 11:06 AM 47.2 40.0 - 80.0 % Final   07/09/2024 09:48 AM 49.7 40.0 - 80.0 % Final   06/25/2024 09:38 AM 51.7 40.0 - 80.0 % Final     Immature Granulocytes %, Automated   Date/Time Value Ref Range Status   06/23/2025 11:06 AM 0.2 0.0 - 0.9 % Final     Comment:     Immature Granulocyte Count (IG) includes promyelocytes, myelocytes and metamyelocytes but does not include bands. Percent differential counts (%) should be interpreted in the context of the absolute cell counts (cells/UL).   07/09/2024 09:48 AM 0.3 0.0 - 0.9 % Final     Comment:     Immature Granulocyte Count (IG) includes promyelocytes, myelocytes and metamyelocytes but does not include bands. Percent differential counts (%) should be interpreted in the context of the absolute cell counts (cells/UL).   06/25/2024 09:38 AM 0.2 0.0 - 0.9 % Final     Comment:     Immature Granulocyte Count (IG) includes promyelocytes, myelocytes and metamyelocytes but does not include bands. Percent differential counts (%) should be interpreted in the context of the absolute cell counts (cells/UL).     Lymphocytes %   Date/Time Value Ref Range Status   06/23/2025 11:06 AM 42.5 13.0 - 44.0 % Final   07/09/2024 09:48 AM 36.5 13.0 - 44.0 % Final   06/25/2024 09:38 AM 36.1 13.0 - 44.0 % Final     Monocytes %   Date/Time Value Ref Range Status   06/23/2025 11:06 AM 4.3 2.0 - 10.0 % Final   07/09/2024 09:48 AM 5.7 2.0 - 10.0 % Final   06/25/2024 09:38 AM 6.0 2.0 - 10.0 % Final     Eosinophils %   Date/Time Value Ref Range Status   06/23/2025 11:06 AM 5.1 0.0 - 6.0 % Final   07/09/2024 09:48 AM 7.1  0.0 - 6.0 % Final   06/25/2024 09:38 AM 5.2 0.0 - 6.0 % Final     Basophils %   Date/Time Value Ref Range Status   06/23/2025 11:06 AM 0.7 0.0 - 2.0 % Final   07/09/2024 09:48 AM 0.7 0.0 - 2.0 % Final   06/25/2024 09:38 AM 0.8 0.0 - 2.0 % Final     Neutrophils Absolute   Date/Time Value Ref Range Status   06/23/2025 11:06 AM 4.41 1.20 - 7.70 x10*3/uL Final     Comment:     Percent differential counts (%) should be interpreted in the context of the absolute cell counts (cells/uL).   07/09/2024 09:48 AM 4.51 1.20 - 7.70 x10*3/uL Final     Comment:     Percent differential counts (%) should be interpreted in the context of the absolute cell counts (cells/uL).   06/25/2024 09:38 AM 4.75 1.20 - 7.70 x10*3/uL Final     Comment:     Percent differential counts (%) should be interpreted in the context of the absolute cell counts (cells/uL).     Immature Granulocytes Absolute, Automated   Date/Time Value Ref Range Status   06/23/2025 11:06 AM 0.02 0.00 - 0.70 x10*3/uL Final   07/09/2024 09:48 AM 0.03 0.00 - 0.70 x10*3/uL Final   06/25/2024 09:38 AM 0.02 0.00 - 0.70 x10*3/uL Final     Lymphocytes Absolute   Date/Time Value Ref Range Status   06/23/2025 11:06 AM 3.98 1.20 - 4.80 x10*3/uL Final   07/09/2024 09:48 AM 3.31 1.20 - 4.80 x10*3/uL Final   06/25/2024 09:38 AM 3.32 1.20 - 4.80 x10*3/uL Final     Monocytes Absolute   Date/Time Value Ref Range Status   06/23/2025 11:06 AM 0.40 0.10 - 1.00 x10*3/uL Final   07/09/2024 09:48 AM 0.52 0.10 - 1.00 x10*3/uL Final   06/25/2024 09:38 AM 0.55 0.10 - 1.00 x10*3/uL Final     Eosinophils Absolute   Date/Time Value Ref Range Status   06/23/2025 11:06 AM 0.48 0.00 - 0.70 x10*3/uL Final   07/09/2024 09:48 AM 0.64 0.00 - 0.70 x10*3/uL Final   06/25/2024 09:38 AM 0.48 0.00 - 0.70 x10*3/uL Final     Basophils Absolute   Date/Time Value Ref Range Status   06/23/2025 11:06 AM 0.07 0.00 - 0.10 x10*3/uL Final   07/09/2024 09:48 AM 0.06 0.00 - 0.10 x10*3/uL Final   06/25/2024 09:38 AM 0.07 0.00  - 0.10 x10*3/uL Final         Assessment/Plan    Assessment:  1. Leukocytosis:   - - resolved     2. Vitamin B12 deficiency:   The patient will continue with vitamin B12 orally. She is currently on 1000 mcg every other day.  We will change dose to 1000 mcg 3 days/week.     3. Tobacco cessation:   We once again discussed the need for complete and absolute tobacco cessation. She is not interested in quitting at this time.  Will check surveillance CT      4. Polycythemia:   Has been felt to be multifactorial previous dehydration, current tobacco use and hx sleep apnea. Patient has been worked up for sleep apnea. Not on CPAP.   Strongly advised to contact sleep medicine as they may be able to help her.   Iron studies completed and found to be within normal limits thus hemochromatosis is not a concern.  We have sent off JAK2 mutation testing to rule out a primary cause.     Plan:  - 1 year  - sooner if necessary                 Glenys Joseph PA-C                              [1]   Family History  Problem Relation Name Age of Onset    Hypertension Mother      Stroke Mother          CVA    Diabetes Father      Hypertension Father      Heart disease Father      Cancer Father      Heart disease Father's Brother      Other (stents) Father's Brother      Other (defribrillator) Father's Brother

## 2025-06-25 DIAGNOSIS — E11.9 TYPE 2 DIABETES MELLITUS WITHOUT COMPLICATION, WITHOUT LONG-TERM CURRENT USE OF INSULIN: ICD-10-CM

## 2025-06-25 RX ORDER — BLOOD-GLUCOSE SENSOR
EACH MISCELLANEOUS
Qty: 2 EACH | Refills: 2 | Status: SHIPPED | OUTPATIENT
Start: 2025-06-25

## 2025-07-02 DIAGNOSIS — M54.16 LUMBAR RADICULOPATHY: ICD-10-CM

## 2025-07-02 NOTE — TELEPHONE ENCOUNTER
Pt called and stated that the pharmacy said that they never received the prescription for the Ultracet. Called the pharmacy who confirmed the prescription was never received. Requesting prescription to be sent again.

## 2025-07-03 RX ORDER — TRAMADOL HYDROCHLORIDE AND ACETAMINOPHEN 37.5; 325 MG/1; MG/1
1 TABLET ORAL 4 TIMES DAILY PRN
Qty: 120 TABLET | Refills: 0 | Status: SHIPPED | OUTPATIENT
Start: 2025-07-03 | End: 2025-08-02

## 2025-07-08 ENCOUNTER — HOSPITAL ENCOUNTER (OUTPATIENT)
Dept: RADIOLOGY | Facility: HOSPITAL | Age: 55
Discharge: HOME | End: 2025-07-08
Payer: COMMERCIAL

## 2025-07-08 DIAGNOSIS — I10 PRIMARY HYPERTENSION: ICD-10-CM

## 2025-07-08 DIAGNOSIS — I10 BENIGN ESSENTIAL HYPERTENSION: ICD-10-CM

## 2025-07-08 DIAGNOSIS — F41.9 ANXIETY: ICD-10-CM

## 2025-07-08 DIAGNOSIS — D72.828 OTHER ELEVATED WHITE BLOOD CELL (WBC) COUNT: ICD-10-CM

## 2025-07-08 DIAGNOSIS — E11.9 TYPE 2 DIABETES MELLITUS WITHOUT COMPLICATION, WITHOUT LONG-TERM CURRENT USE OF INSULIN: ICD-10-CM

## 2025-07-08 DIAGNOSIS — G62.9 NEUROPATHY: ICD-10-CM

## 2025-07-08 PROCEDURE — 71271 CT THORAX LUNG CANCER SCR C-: CPT | Performed by: RADIOLOGY

## 2025-07-08 PROCEDURE — 71271 CT THORAX LUNG CANCER SCR C-: CPT

## 2025-07-08 RX ORDER — BLOOD-GLUCOSE SENSOR
1 EACH MISCELLANEOUS CONTINUOUS
Qty: 2 EACH | Refills: 5 | Status: SHIPPED | OUTPATIENT
Start: 2025-07-08

## 2025-07-08 RX ORDER — AMITRIPTYLINE HYDROCHLORIDE 25 MG/1
25 TABLET, FILM COATED ORAL 2 TIMES DAILY
Qty: 180 TABLET | Refills: 1 | Status: SHIPPED | OUTPATIENT
Start: 2025-07-08 | End: 2026-01-04

## 2025-07-08 RX ORDER — HYDROCHLOROTHIAZIDE 12.5 MG/1
12.5 CAPSULE ORAL
Qty: 90 CAPSULE | Refills: 1 | Status: SHIPPED | OUTPATIENT
Start: 2025-07-08

## 2025-07-08 RX ORDER — HYDROCHLOROTHIAZIDE 25 MG/1
120 TABLET ORAL NIGHTLY
Qty: 90 TABLET | Refills: 1 | Status: SHIPPED | OUTPATIENT
Start: 2025-07-08 | End: 2026-01-04

## 2025-07-08 RX ORDER — ESCITALOPRAM OXALATE 20 MG/1
20 TABLET ORAL NIGHTLY
Qty: 90 TABLET | Refills: 1 | Status: SHIPPED | OUTPATIENT
Start: 2025-07-08 | End: 2026-01-04

## 2025-07-08 RX ORDER — CARVEDILOL 12.5 MG/1
12.5 TABLET ORAL
Qty: 180 TABLET | Refills: 1 | Status: SHIPPED | OUTPATIENT
Start: 2025-07-08

## 2025-07-08 RX ORDER — POTASSIUM CHLORIDE 750 MG/1
20 TABLET, FILM COATED, EXTENDED RELEASE ORAL DAILY
Qty: 180 TABLET | Refills: 1 | Status: SHIPPED | OUTPATIENT
Start: 2025-07-08 | End: 2026-01-04

## 2025-07-10 ENCOUNTER — DOCUMENTATION (OUTPATIENT)
Dept: HEMATOLOGY/ONCOLOGY | Facility: CLINIC | Age: 55
End: 2025-07-10
Payer: COMMERCIAL

## 2025-07-10 DIAGNOSIS — R91.1 LUNG NODULE SEEN ON IMAGING STUDY: Primary | ICD-10-CM

## 2025-07-10 NOTE — PROGRESS NOTES
CT  scan 7/8/25 showed new 5mm nodule, too small to biopsy or do anything about  Will repeat CT in 6 months to follow  Reminded to follow up with sleep study  Glenys Joseph PA-C

## 2025-07-18 ENCOUNTER — APPOINTMENT (OUTPATIENT)
Dept: PAIN MEDICINE | Facility: CLINIC | Age: 55
End: 2025-07-18
Payer: COMMERCIAL

## 2025-08-05 DIAGNOSIS — E11.9 TYPE 2 DIABETES MELLITUS WITHOUT COMPLICATION, WITHOUT LONG-TERM CURRENT USE OF INSULIN: ICD-10-CM

## 2025-08-05 RX ORDER — DULAGLUTIDE 4.5 MG/.5ML
4.5 INJECTION, SOLUTION SUBCUTANEOUS WEEKLY
Qty: 2 ML | Refills: 2 | Status: SHIPPED | OUTPATIENT
Start: 2025-08-05

## 2025-08-12 ENCOUNTER — DOCUMENTATION (OUTPATIENT)
Dept: PHYSICAL THERAPY | Facility: CLINIC | Age: 55
End: 2025-08-12
Payer: COMMERCIAL

## 2025-08-18 DIAGNOSIS — M54.16 LUMBAR RADICULOPATHY: Primary | ICD-10-CM

## 2025-08-18 RX ORDER — TRAMADOL HYDROCHLORIDE AND ACETAMINOPHEN 37.5; 325 MG/1; MG/1
1 TABLET ORAL EVERY 6 HOURS PRN
Qty: 28 TABLET | Refills: 0 | Status: SHIPPED | OUTPATIENT
Start: 2025-08-18 | End: 2025-08-25

## 2025-08-18 RX ORDER — TRAMADOL HYDROCHLORIDE AND ACETAMINOPHEN 37.5; 325 MG/1; MG/1
1 TABLET ORAL EVERY 6 HOURS PRN
COMMUNITY
End: 2025-08-18 | Stop reason: SDUPTHER

## 2025-08-20 ENCOUNTER — OFFICE VISIT (OUTPATIENT)
Dept: ORTHOPEDIC SURGERY | Facility: CLINIC | Age: 55
End: 2025-08-20
Payer: COMMERCIAL

## 2025-08-20 DIAGNOSIS — M16.12 PRIMARY LOCALIZED OSTEOARTHRITIS OF LEFT HIP: Primary | ICD-10-CM

## 2025-08-20 PROCEDURE — 99212 OFFICE O/P EST SF 10 MIN: CPT

## 2025-08-20 PROCEDURE — 4010F ACE/ARB THERAPY RXD/TAKEN: CPT

## 2025-08-20 PROCEDURE — 3044F HG A1C LEVEL LT 7.0%: CPT

## 2025-08-20 PROCEDURE — 99213 OFFICE O/P EST LOW 20 MIN: CPT

## 2025-08-25 ENCOUNTER — OFFICE VISIT (OUTPATIENT)
Dept: PAIN MEDICINE | Facility: CLINIC | Age: 55
End: 2025-08-25
Payer: COMMERCIAL

## 2025-08-25 VITALS
RESPIRATION RATE: 18 BRPM | WEIGHT: 240 LBS | HEART RATE: 68 BPM | HEIGHT: 68 IN | BODY MASS INDEX: 36.37 KG/M2 | DIASTOLIC BLOOD PRESSURE: 70 MMHG | SYSTOLIC BLOOD PRESSURE: 118 MMHG | OXYGEN SATURATION: 99 %

## 2025-08-25 DIAGNOSIS — M25.552 CHRONIC LEFT HIP PAIN: ICD-10-CM

## 2025-08-25 DIAGNOSIS — Z79.891 LONG TERM (CURRENT) USE OF OPIATE ANALGESIC: Primary | ICD-10-CM

## 2025-08-25 DIAGNOSIS — M54.16 LUMBAR RADICULOPATHY: ICD-10-CM

## 2025-08-25 DIAGNOSIS — G89.29 CHRONIC LEFT HIP PAIN: ICD-10-CM

## 2025-08-25 PROBLEM — R42 DIZZINESS: Status: ACTIVE | Noted: 2025-08-25

## 2025-08-25 PROBLEM — N89.8 VAGINAL DRYNESS: Status: ACTIVE | Noted: 2025-08-25

## 2025-08-25 PROBLEM — F17.200 TOBACCO DEPENDENCE SYNDROME: Status: ACTIVE | Noted: 2023-06-26

## 2025-08-25 PROBLEM — K21.9 GASTROESOPHAGEAL REFLUX DISEASE: Status: ACTIVE | Noted: 2023-06-26

## 2025-08-25 PROBLEM — F32.A DEPRESSIVE DISORDER: Status: ACTIVE | Noted: 2025-08-25

## 2025-08-25 PROBLEM — K21.9 GASTROESOPHAGEAL REFLUX DISEASE WITHOUT ESOPHAGITIS: Status: ACTIVE | Noted: 2019-04-26

## 2025-08-25 PROBLEM — J31.0 RHINITIS: Status: ACTIVE | Noted: 2025-08-25

## 2025-08-25 PROBLEM — Z79.85 LONG-TERM (CURRENT) USE OF INJECTABLE NON-INSULIN ANTIDIABETIC DRUGS: Status: ACTIVE | Noted: 2023-06-26

## 2025-08-25 PROBLEM — G93.9 HEADACHE DUE TO INTRACRANIAL DISEASE: Status: ACTIVE | Noted: 2025-08-25

## 2025-08-25 PROBLEM — R51.9 HEADACHE DUE TO INTRACRANIAL DISEASE: Status: ACTIVE | Noted: 2025-08-25

## 2025-08-25 PROBLEM — G47.33 OBSTRUCTIVE SLEEP APNEA SYNDROME: Status: ACTIVE | Noted: 2025-08-25

## 2025-08-25 PROBLEM — J32.9 SINUSITIS: Status: ACTIVE | Noted: 2025-08-25

## 2025-08-25 PROBLEM — M51.369 DEGENERATION OF INTERVERTEBRAL DISC OF LUMBAR REGION: Status: ACTIVE | Noted: 2025-08-25

## 2025-08-25 PROBLEM — N92.6 IRREGULAR MENSTRUAL CYCLE: Status: ACTIVE | Noted: 2025-08-25

## 2025-08-25 PROCEDURE — 3044F HG A1C LEVEL LT 7.0%: CPT | Performed by: ANESTHESIOLOGY

## 2025-08-25 PROCEDURE — 3074F SYST BP LT 130 MM HG: CPT | Performed by: ANESTHESIOLOGY

## 2025-08-25 PROCEDURE — 99213 OFFICE O/P EST LOW 20 MIN: CPT

## 2025-08-25 PROCEDURE — 3008F BODY MASS INDEX DOCD: CPT | Performed by: ANESTHESIOLOGY

## 2025-08-25 PROCEDURE — 3078F DIAST BP <80 MM HG: CPT | Performed by: ANESTHESIOLOGY

## 2025-08-25 PROCEDURE — 4010F ACE/ARB THERAPY RXD/TAKEN: CPT | Performed by: ANESTHESIOLOGY

## 2025-08-25 PROCEDURE — 99214 OFFICE O/P EST MOD 30 MIN: CPT | Performed by: ANESTHESIOLOGY

## 2025-08-25 RX ORDER — CHLORHEXIDINE GLUCONATE ORAL RINSE 1.2 MG/ML
SOLUTION DENTAL
COMMUNITY
Start: 2025-04-14

## 2025-08-25 RX ORDER — HYDROCODONE BITARTRATE AND ACETAMINOPHEN 7.5; 325 MG/1; MG/1
1 TABLET ORAL 4 TIMES DAILY PRN
Qty: 120 TABLET | Refills: 0 | Status: SHIPPED | OUTPATIENT
Start: 2025-08-25 | End: 2025-09-24

## 2025-08-25 ASSESSMENT — ENCOUNTER SYMPTOMS
PSYCHIATRIC NEGATIVE: 1
NUMBNESS: 1
RESPIRATORY NEGATIVE: 1
CARDIOVASCULAR NEGATIVE: 1
ENDOCRINE NEGATIVE: 1
WEAKNESS: 1
HEMATOLOGIC/LYMPHATIC NEGATIVE: 1
GASTROINTESTINAL NEGATIVE: 1
BACK PAIN: 1
CONSTITUTIONAL NEGATIVE: 1
EYES NEGATIVE: 1

## 2025-08-25 ASSESSMENT — PAIN - FUNCTIONAL ASSESSMENT: PAIN_FUNCTIONAL_ASSESSMENT: 0-10

## 2025-08-25 ASSESSMENT — PAIN SCALES - GENERAL
PAINLEVEL_OUTOF10: 7
PAINLEVEL_OUTOF10: 7

## 2025-08-27 PROBLEM — G89.29 CHRONIC LEFT HIP PAIN: Status: ACTIVE | Noted: 2025-08-27

## 2025-08-27 PROBLEM — M25.552 CHRONIC LEFT HIP PAIN: Status: ACTIVE | Noted: 2025-08-27

## 2025-08-29 LAB
1OH-MIDAZOLAM UR-MCNC: NEGATIVE NG/ML
7AMINOCLONAZEPAM UR-MCNC: NEGATIVE NG/ML
A-OH ALPRAZ UR-MCNC: NEGATIVE NG/ML
A-OH-TRIAZOLAM UR-MCNC: NEGATIVE NG/ML
AMPHETAMINES UR QL: NEGATIVE NG/ML
BARBITURATES UR QL: NEGATIVE NG/ML
BZE UR QL: NEGATIVE NG/ML
CODEINE UR-MCNC: NEGATIVE NG/ML
CREAT UR-MCNC: 82.2 MG/DL
DRUG SCREEN COMMENT UR-IMP: ABNORMAL
EDDP UR-MCNC: NEGATIVE NG/ML
FENTANYL UR-MCNC: NEGATIVE NG/ML
HYDROCODONE UR-MCNC: NEGATIVE NG/ML
HYDROMORPHONE UR-MCNC: NEGATIVE NG/ML
LORAZEPAM UR-MCNC: NEGATIVE NG/ML
METHADONE UR-MCNC: NEGATIVE NG/ML
MORPHINE UR-MCNC: NEGATIVE NG/ML
NORDIAZEPAM UR-MCNC: NEGATIVE NG/ML
NORFENTANYL UR-MCNC: NEGATIVE NG/ML
NORHYDROCODONE UR CFM-MCNC: NEGATIVE NG/ML
NOROXYCODONE UR CFM-MCNC: NEGATIVE NG/ML
NORTRAMADOL UR-MCNC: 2659 NG/ML
OH-ETHYLFLURAZ UR-MCNC: NEGATIVE NG/ML
OXAZEPAM UR-MCNC: NEGATIVE NG/ML
OXIDANTS UR QL: NEGATIVE MCG/ML
OXYCODONE UR CFM-MCNC: NEGATIVE NG/ML
OXYMORPHONE UR CFM-MCNC: NEGATIVE NG/ML
PCP UR QL: NEGATIVE NG/ML
PH UR: 7.2 [PH] (ref 4.5–9)
QUEST 6 ACETYLMORPHINE: NEGATIVE NG/ML
QUEST NOTES AND COMMENTS: ABNORMAL
QUEST ZOLPIDEM: NEGATIVE NG/ML
TEMAZEPAM UR-MCNC: NEGATIVE NG/ML
THC UR QL: NEGATIVE NG/ML
TRAMADOL UR-MCNC: 5971 NG/ML
ZOLPIDEM PHENYL-4-CARB UR CFM-MCNC: NEGATIVE NG/ML

## (undated) DEVICE — GLOVE, SURGICAL, SENSITIVE, GAMMEX, SZ-7.5 , PF, WHITE

## (undated) DEVICE — Device

## (undated) DEVICE — INSUFFLATION TUBING, HIGH FLOW, W/FILTER

## (undated) DEVICE — SOLUTION, IRRIGATION, X RX SODIUM CHL 0.9%, 1000ML BTL

## (undated) DEVICE — NEEDLE, INSUFFLATION, 13GAX120MM, DISP

## (undated) DEVICE — TUBING, SUCTION, 9 FT, STERILE, CLEAR

## (undated) DEVICE — SUTURE, VICRYL, 0, 27 IN, UR-6, VIOLET

## (undated) DEVICE — SUTURE, MONOCRYL, 4-0, 27 IN, PS-2, UNDYED

## (undated) DEVICE — TISSUE ADHESIVE, PREMIERPRO EXOFIN, PRECISION PEN HV, 1.0ML

## (undated) DEVICE — IRRIGATOR, WOUND, HYDRO SURG PLUS, W/O TIP, DISP

## (undated) DEVICE — SOLUTION, INJECTION, SODIUM CHLORIDE 9%, 3000ML

## (undated) DEVICE — LIGASURE, L-HOOK 37CM SEALER/DIVIDER LAP, MARYLAND

## (undated) DEVICE — ASSEMBLY, STRYKER FLOW 2, SUCTION IRRIGATOR, WITH TIP

## (undated) DEVICE — MANIPULATOR, ADVINCULA DELINEATOR, 3.5CM

## (undated) DEVICE — APPLICATOR, CHLORAPREP, W/ORANGE TINT, 26ML

## (undated) DEVICE — CATHETER TRAY, FOLEY, ALOETOUCH, 16FR, 10ML, W/ DRAINBAG

## (undated) DEVICE — DRAPE PACK, LAVH, STERILE

## (undated) DEVICE — TIP, SUCTION, YANKAUER, BULB, ADULT

## (undated) DEVICE — TOWELS 4-PK

## (undated) DEVICE — POSITIONING SYSTEM, PAGAZZI, PATIENT

## (undated) DEVICE — SYRINGE, DILIATION, 60CC

## (undated) DEVICE — SPONGE, GAUZE, RADIOPAQUE, 12 PLY, 4 X 8 IN, STERILE, LF

## (undated) DEVICE — CORD, MONOPOLARD, 10FT, DISP

## (undated) DEVICE — TUBE SET, PNEUMOCLEAR, SMOKE EVACU, HIGH-FLOW

## (undated) DEVICE — SLEEVE, VASO PRESS, CALF GARMENT, MEDIUM, GREEN

## (undated) DEVICE — TIP, SUCTION, YANKAUER, FLEXIBLE